# Patient Record
Sex: FEMALE | Race: BLACK OR AFRICAN AMERICAN | NOT HISPANIC OR LATINO
[De-identification: names, ages, dates, MRNs, and addresses within clinical notes are randomized per-mention and may not be internally consistent; named-entity substitution may affect disease eponyms.]

---

## 2017-02-08 ENCOUNTER — APPOINTMENT (OUTPATIENT)
Dept: OBGYN | Facility: HOSPITAL | Age: 32
End: 2017-02-08

## 2017-02-16 ENCOUNTER — APPOINTMENT (OUTPATIENT)
Dept: INTERNAL MEDICINE | Facility: HOSPITAL | Age: 32
End: 2017-02-16

## 2017-02-28 ENCOUNTER — APPOINTMENT (OUTPATIENT)
Dept: INTERNAL MEDICINE | Facility: HOSPITAL | Age: 32
End: 2017-02-28

## 2017-03-16 ENCOUNTER — LABORATORY RESULT (OUTPATIENT)
Age: 32
End: 2017-03-16

## 2017-03-16 ENCOUNTER — APPOINTMENT (OUTPATIENT)
Dept: OBGYN | Facility: HOSPITAL | Age: 32
End: 2017-03-16

## 2017-03-16 ENCOUNTER — OUTPATIENT (OUTPATIENT)
Dept: OUTPATIENT SERVICES | Facility: HOSPITAL | Age: 32
LOS: 1 days | End: 2017-03-16

## 2017-03-16 ENCOUNTER — RESULT CHARGE (OUTPATIENT)
Age: 32
End: 2017-03-16

## 2017-03-16 VITALS
SYSTOLIC BLOOD PRESSURE: 124 MMHG | DIASTOLIC BLOOD PRESSURE: 84 MMHG | WEIGHT: 252.56 LBS | HEART RATE: 71 BPM | BODY MASS INDEX: 44.74 KG/M2

## 2017-03-16 DIAGNOSIS — N91.2 AMENORRHEA, UNSPECIFIED: ICD-10-CM

## 2017-03-17 LAB
ESTRADIOL FREE SERPL-MCNC: 58 PG/ML — SIGNIFICANT CHANGE UP
FSH SERPL-MCNC: 5 IU/L — SIGNIFICANT CHANGE UP
HCG UR QL: NEGATIVE
LH SERPL-ACNC: 4.4 IU/L — SIGNIFICANT CHANGE UP
QUALITY CONTROL: YES

## 2017-03-30 ENCOUNTER — APPOINTMENT (OUTPATIENT)
Dept: OBGYN | Facility: HOSPITAL | Age: 32
End: 2017-03-30

## 2017-04-05 ENCOUNTER — APPOINTMENT (OUTPATIENT)
Dept: ULTRASOUND IMAGING | Facility: IMAGING CENTER | Age: 32
End: 2017-04-05

## 2017-04-05 ENCOUNTER — APPOINTMENT (OUTPATIENT)
Dept: INTERNAL MEDICINE | Facility: HOSPITAL | Age: 32
End: 2017-04-05

## 2017-05-17 ENCOUNTER — APPOINTMENT (OUTPATIENT)
Dept: OBGYN | Facility: HOSPITAL | Age: 32
End: 2017-05-17

## 2017-06-08 ENCOUNTER — EMERGENCY (EMERGENCY)
Facility: HOSPITAL | Age: 32
LOS: 1 days | Discharge: HOME | End: 2017-06-08
Attending: EMERGENCY MEDICINE | Admitting: EMERGENCY MEDICINE
Payer: MEDICAID

## 2017-06-08 VITALS
TEMPERATURE: 98 F | DIASTOLIC BLOOD PRESSURE: 88 MMHG | OXYGEN SATURATION: 100 % | SYSTOLIC BLOOD PRESSURE: 140 MMHG | RESPIRATION RATE: 16 BRPM | HEART RATE: 89 BPM

## 2017-06-08 PROCEDURE — 99284 EMERGENCY DEPT VISIT MOD MDM: CPT

## 2017-06-08 RX ORDER — DIPHENHYDRAMINE HCL 50 MG
1 CAPSULE ORAL
Qty: 12 | Refills: 0 | OUTPATIENT
Start: 2017-06-08 | End: 2017-06-11

## 2017-06-08 RX ORDER — DIPHENHYDRAMINE HCL 50 MG
25 CAPSULE ORAL ONCE
Qty: 0 | Refills: 0 | Status: COMPLETED | OUTPATIENT
Start: 2017-06-08 | End: 2017-06-08

## 2017-06-08 RX ADMIN — Medication 40 MILLIGRAM(S): at 15:38

## 2017-06-08 RX ADMIN — Medication 25 MILLIGRAM(S): at 15:38

## 2017-06-08 NOTE — ED PROVIDER NOTE - CARE PLAN
Principal Discharge DX:	Allergic reaction  Instructions for follow-up, activity and diet:	The patient was given verbal and written discharge instructions. Specifically, instructions when to return to the ED and when to seek follow-up from their pcp was discussed. Any specialty follow-up was discussed, including how to make an appointment.  Instructions were discussed in simple, plain language and was understood by the patient. The patient understands that should their symptoms worsen or any new symptoms arise, they should return to the ED immediately for further evaluation.

## 2017-06-08 NOTE — ED PROVIDER NOTE - ATTENDING CONTRIBUTION TO CARE
DR. PERALES, ATTENDING MD-  I performed a face to face bedside interview with patient regarding history of present illness, review of symptoms and past medical history. I completed an independent physical exam.  I have discussed patient's plan of care with the resident.   Documentation as above in the note.    31 y/o female with hives x2 days.  Face, arms, abd.  No known allergen.  No new environmental exposures/detergents/perfumes.  Denies f/c, ha, neck stiffness, cp, voice changes, sob, cough, abd pain, n/v/d, dysuria.  Afebrile, vs wnl, nad, ctabil, no stridor/wheeze, no sublingual edema, s1s2 rrr no m/r/g, abd soft non ttp no r/g, no cva tenderness b/l, no leg swelling b/l, mult scattered hives over face/arms/abd.  Allergic rxn:  hives.  No airway involvement, no gi/resp involvement.  Benadryl, prednisone, allergist f/u as o/p, discharge.

## 2017-06-08 NOTE — ED PROVIDER NOTE - SKIN, MLM
Skin normal color for race, warm, dry and intact. Multiple pruritic erythematous blanching lesions of bl upper face, left forearm, rt hand. Nontender. also bl facial swelling above upper eyelids

## 2017-06-08 NOTE — ED PROVIDER NOTE - OBJECTIVE STATEMENT
32yF no pmhx p/w facial swelling and itching x 2d. Seen at outside ED and given benadryl yesterday, which alleviated symptoms mildly but not fully. Awoke today with worsened facial edema. No difficulty breathing or airway swelling. No lip or oropharyngeal swelling. Swelling noted mostly at left upper eyelid. Pt also endorses itching of face, left arm and rt hand. Has not had similar symptoms in past. No new exposures that pt recalls aside for anxiety medication (pt received one dose 1 wk ago when in ED for headache which has since resolved). Pt also notes that she has had exertional chest tightness x 2d, lasts ~5min. No other associated symptoms. No personal cardiac hx. Mother had a CVA.

## 2017-06-08 NOTE — ED ADULT TRIAGE NOTE - CHIEF COMPLAINT QUOTE
Pt with c/o of having an allergic reaction pt noted with hives to her arms and face no lip or tongue swelling pt speaking in clear sentences symptoms going on for three days.

## 2017-06-09 ENCOUNTER — OTHER (OUTPATIENT)
Age: 32
End: 2017-06-09

## 2017-06-09 ENCOUNTER — RX RENEWAL (OUTPATIENT)
Age: 32
End: 2017-06-09

## 2017-06-09 ENCOUNTER — EMERGENCY (EMERGENCY)
Facility: HOSPITAL | Age: 32
LOS: 1 days | Discharge: HOME | End: 2017-06-09
Attending: EMERGENCY MEDICINE | Admitting: EMERGENCY MEDICINE
Payer: MEDICAID

## 2017-06-09 VITALS
RESPIRATION RATE: 20 BRPM | DIASTOLIC BLOOD PRESSURE: 90 MMHG | HEART RATE: 86 BPM | TEMPERATURE: 99 F | SYSTOLIC BLOOD PRESSURE: 157 MMHG | OXYGEN SATURATION: 99 %

## 2017-06-09 DIAGNOSIS — Z98.890 OTHER SPECIFIED POSTPROCEDURAL STATES: Chronic | ICD-10-CM

## 2017-06-09 LAB
ALBUMIN SERPL ELPH-MCNC: 3.8 G/DL — SIGNIFICANT CHANGE UP (ref 3.3–5)
ALP SERPL-CCNC: 43 U/L — SIGNIFICANT CHANGE UP (ref 40–120)
ALT FLD-CCNC: 22 U/L — SIGNIFICANT CHANGE UP (ref 4–33)
AST SERPL-CCNC: 17 U/L — SIGNIFICANT CHANGE UP (ref 4–32)
BASOPHILS # BLD AUTO: 0.01 K/UL — SIGNIFICANT CHANGE UP (ref 0–0.2)
BASOPHILS NFR BLD AUTO: 0.1 % — SIGNIFICANT CHANGE UP (ref 0–2)
BILIRUB SERPL-MCNC: 0.2 MG/DL — SIGNIFICANT CHANGE UP (ref 0.2–1.2)
BUN SERPL-MCNC: 15 MG/DL — SIGNIFICANT CHANGE UP (ref 7–23)
CALCIUM SERPL-MCNC: 9 MG/DL — SIGNIFICANT CHANGE UP (ref 8.4–10.5)
CHLORIDE SERPL-SCNC: 103 MMOL/L — SIGNIFICANT CHANGE UP (ref 98–107)
CO2 SERPL-SCNC: 23 MMOL/L — SIGNIFICANT CHANGE UP (ref 22–31)
CREAT SERPL-MCNC: 0.92 MG/DL — SIGNIFICANT CHANGE UP (ref 0.5–1.3)
EOSINOPHIL # BLD AUTO: 0.83 K/UL — HIGH (ref 0–0.5)
EOSINOPHIL NFR BLD AUTO: 4.9 % — SIGNIFICANT CHANGE UP (ref 0–6)
GLUCOSE SERPL-MCNC: 99 MG/DL — SIGNIFICANT CHANGE UP (ref 70–99)
HCT VFR BLD CALC: 42.4 % — SIGNIFICANT CHANGE UP (ref 34.5–45)
HGB BLD-MCNC: 13.6 G/DL — SIGNIFICANT CHANGE UP (ref 11.5–15.5)
IMM GRANULOCYTES NFR BLD AUTO: 0.3 % — SIGNIFICANT CHANGE UP (ref 0–1.5)
LYMPHOCYTES # BLD AUTO: 27.2 % — SIGNIFICANT CHANGE UP (ref 13–44)
LYMPHOCYTES # BLD AUTO: 4.6 K/UL — HIGH (ref 1–3.3)
MCHC RBC-ENTMCNC: 29.7 PG — SIGNIFICANT CHANGE UP (ref 27–34)
MCHC RBC-ENTMCNC: 32.1 % — SIGNIFICANT CHANGE UP (ref 32–36)
MCV RBC AUTO: 92.6 FL — SIGNIFICANT CHANGE UP (ref 80–100)
MONOCYTES # BLD AUTO: 1.16 K/UL — HIGH (ref 0–0.9)
MONOCYTES NFR BLD AUTO: 6.9 % — SIGNIFICANT CHANGE UP (ref 2–14)
NEUTROPHILS # BLD AUTO: 10.26 K/UL — HIGH (ref 1.8–7.4)
NEUTROPHILS NFR BLD AUTO: 60.6 % — SIGNIFICANT CHANGE UP (ref 43–77)
PLATELET # BLD AUTO: 361 K/UL — SIGNIFICANT CHANGE UP (ref 150–400)
PMV BLD: 10.5 FL — SIGNIFICANT CHANGE UP (ref 7–13)
POTASSIUM SERPL-MCNC: 3.7 MMOL/L — SIGNIFICANT CHANGE UP (ref 3.5–5.3)
POTASSIUM SERPL-SCNC: 3.7 MMOL/L — SIGNIFICANT CHANGE UP (ref 3.5–5.3)
PROT SERPL-MCNC: 7.4 G/DL — SIGNIFICANT CHANGE UP (ref 6–8.3)
RBC # BLD: 4.58 M/UL — SIGNIFICANT CHANGE UP (ref 3.8–5.2)
RBC # FLD: 13 % — SIGNIFICANT CHANGE UP (ref 10.3–14.5)
SODIUM SERPL-SCNC: 139 MMOL/L — SIGNIFICANT CHANGE UP (ref 135–145)
WBC # BLD: 16.91 K/UL — HIGH (ref 3.8–10.5)
WBC # FLD AUTO: 16.91 K/UL — HIGH (ref 3.8–10.5)

## 2017-06-09 PROCEDURE — 99220: CPT

## 2017-06-09 RX ORDER — SODIUM CHLORIDE 9 MG/ML
3000 INJECTION INTRAMUSCULAR; INTRAVENOUS; SUBCUTANEOUS ONCE
Qty: 0 | Refills: 0 | Status: COMPLETED | OUTPATIENT
Start: 2017-06-09 | End: 2017-06-09

## 2017-06-09 RX ORDER — DIPHENHYDRAMINE HCL 50 MG
25 CAPSULE ORAL ONCE
Qty: 0 | Refills: 0 | Status: COMPLETED | OUTPATIENT
Start: 2017-06-09 | End: 2017-06-09

## 2017-06-09 RX ORDER — IPRATROPIUM/ALBUTEROL SULFATE 18-103MCG
3 AEROSOL WITH ADAPTER (GRAM) INHALATION ONCE
Qty: 0 | Refills: 0 | Status: COMPLETED | OUTPATIENT
Start: 2017-06-09 | End: 2017-06-09

## 2017-06-09 RX ORDER — FAMOTIDINE 10 MG/ML
20 INJECTION INTRAVENOUS ONCE
Qty: 0 | Refills: 0 | Status: COMPLETED | OUTPATIENT
Start: 2017-06-09 | End: 2017-06-09

## 2017-06-09 RX ORDER — EPINEPHRINE 0.3 MG/.3ML
0.3 INJECTION INTRAMUSCULAR; SUBCUTANEOUS ONCE
Qty: 0 | Refills: 0 | Status: COMPLETED | OUTPATIENT
Start: 2017-06-09 | End: 2017-06-09

## 2017-06-09 RX ORDER — DIPHENHYDRAMINE HCL 50 MG
25 CAPSULE ORAL EVERY 6 HOURS
Qty: 0 | Refills: 0 | Status: DISCONTINUED | OUTPATIENT
Start: 2017-06-09 | End: 2017-06-13

## 2017-06-09 RX ORDER — FAMOTIDINE 10 MG/ML
20 INJECTION INTRAVENOUS EVERY 12 HOURS
Qty: 0 | Refills: 0 | Status: DISCONTINUED | OUTPATIENT
Start: 2017-06-09 | End: 2017-06-13

## 2017-06-09 RX ADMIN — Medication 25 MILLIGRAM(S): at 16:44

## 2017-06-09 RX ADMIN — Medication 25 MILLIGRAM(S): at 21:07

## 2017-06-09 RX ADMIN — Medication 60 MILLIGRAM(S): at 20:19

## 2017-06-09 RX ADMIN — Medication 60 MILLIGRAM(S): at 16:44

## 2017-06-09 RX ADMIN — EPINEPHRINE 0.3 MILLIGRAM(S): 0.3 INJECTION INTRAMUSCULAR; SUBCUTANEOUS at 18:50

## 2017-06-09 RX ADMIN — Medication 3 MILLILITER(S): at 18:50

## 2017-06-09 RX ADMIN — FAMOTIDINE 20 MILLIGRAM(S): 10 INJECTION INTRAVENOUS at 16:44

## 2017-06-09 RX ADMIN — SODIUM CHLORIDE 3000 MILLILITER(S): 9 INJECTION INTRAMUSCULAR; INTRAVENOUS; SUBCUTANEOUS at 18:03

## 2017-06-09 NOTE — ED SUB INTERN NOTE - MEDICAL DECISION MAKING DETAILS
31 y/o F with no PMH presenting with swelling of her L eyelid and inability to open the eye with mild R sided eye lid swelling as well. Came in yesterdoy for similar symptoms on the sides of her face, was d/sammie with benadryl, motrin and prednisone after improvement in the ED. Denies any constitutional symptoms. May have some itchy throat. PE significant for severe swelling of the Left eye with inabilty to open the eye on command and mild R eye swelling. EOMI intact. Presentation most likely consistent with allergic reaction in light of no constitutional symptoms, b/l eye involvement and itchy eyes and throat. Less likely to be orbital cellulitis due to no fever, intact EOM, no tenderness and erythematous.   Plan:   -IV benadryl, pepcid and salumedrol  -CDU for obs due to failed outpatient management. 31 y/o F with no PMH presenting with swelling of her L eyelid and inability to open the eye with mild R sided eye lid swelling as well. Came in yesterdoy for similar symptoms on the sides of her face, was d/sammie with benadryl, motrin and prednisone after improvement in the ED. Denies any constitutional symptoms. May have some itchy throat. PE significant for severe swelling of the Left eye with inabilty to open the eye on command and mild R eye swelling. EOMI intact. Presentation most likely consistent with allergic reaction in light of no constitutional symptoms, b/l eye involvement and itchy eyes and throat. Less likely to be orbital cellulitis due to no fever, intact EOM, no tenderness and erythematous.   Plan:   -IV benadryl, pepcid and salumedrol  -CDU for obs due to failed outpatient management

## 2017-06-09 NOTE — ED PROVIDER NOTE - EYES, MLM
swelling to bilateral eyelids L>R, EOMI without pain, no conjunctival injection, no tenderness to infraorbital rim

## 2017-06-09 NOTE — ED ADULT NURSE NOTE - OBJECTIVE STATEMENT
31 y/o F with no pertinent medical history presents with complaint of bilateral eye swelling x 2 days. Patient was evaluated in [facility] ED yesterday for same issue but was only having mild L eyelid swelling at that time. Reports that she colored her hair day prior to symptoms beginning. Received prednisone 40 mg and and benadryl yesterday in ED. Was prescribed prednisone as outpatient but did not take dose today secondary to symptoms worsening. Took benadryl once this morning. Reports itching in throat.

## 2017-06-09 NOTE — ED PROVIDER NOTE - MEDICAL DECISION MAKING DETAILS
33 y/o F with allergic reaction x 2 days with bilateral eye swelling worsening since yesterday despite outpatient mangement with oral medications. Will give IV Benadryl/pepcid/solu-medrol. Observation for worsening symptoms

## 2017-06-09 NOTE — ED SUB INTERN NOTE - OBJECTIVE STATEMENT FT
31 y/o F with no PMH presenting with swelling of her left eye as of this morning. She presented to the ED with a similar swelling on the side of her face and was discharged with benadryl, Motrin and prednisone. This morning she woke up with her left eye closed shut and was unable to open it 2/2 swelling. She also reports her right eye beginning to swell also. She reports some "tickling" inside her throat. Reports she used a new hair dye on Tuesday which may have caused an allergic reaction. She denies any fevers, chills, sweats, abdominal pain, lip swelling, chest pain. Is allergic to sesame seeds, denies any seasonal allergies or asthma history.

## 2017-06-09 NOTE — ED PROVIDER NOTE - CHPI ED SYMPTOMS NEG
no chills/no fever no fever/no chest pain, no SOB, no lip swelling, no tongue swelling, no abdominal pain/no nausea/no chills/no vomiting

## 2017-06-09 NOTE — ED ADULT NURSE NOTE - CAS EDN DISCHARGE ASSESSMENT
Patient baseline mental status/Neuro vascular intact post splinting/Symptoms improved/Alert and oriented to person, place and time/Awake Awake/Symptoms improved/Patient baseline mental status/Alert and oriented to person, place and time

## 2017-06-09 NOTE — ED CDU PROVIDER NOTE - PLAN OF CARE
Rest, drink plenty of fluids.  Advance activity as tolerated.  Continue all previously prescribed medications as directed. Use Epipen as directed for severe allergic reactions . Follow up with your primary care physician in 48-72 hours- bring copies of your results.  Follow up at your scheduled allergist appointment.  Return to the Emergency Department for worsening or persistent symptoms OR ANY NEW OR CONCERNING SYMPTOMS.

## 2017-06-09 NOTE — ED CDU PROVIDER NOTE - CHPI ED SYMPTOMS NEG
no nausea/no chest pain, no SOB, no lip swelling, no tongue swelling, no abdominal pain/no vomiting/no fever/no chills

## 2017-06-09 NOTE — ED SUB INTERN NOTE - BILATERAL EYES
L side- swelling of upper and lower eyelids. R side- upper lateral eye lid swollen. No redness or tenderness b/l./pupils equal, round, and reactive to light/TENDERNESS/SWELLING/clear

## 2017-06-09 NOTE — ED CDU PROVIDER NOTE - EYES, MLM
Clear bilaterally, pupils equal, round and reactive to light. Significant nallely eye swelling, left eye swollen shut, however full pain EOM noted when holding eye open

## 2017-06-09 NOTE — ED CDU PROVIDER NOTE - OBJECTIVE STATEMENT
33 y/o F with no pertinent medical history presents with complaint of bilateral eye swelling x 2 days. Patient was evaluated in [facility] ED yesterday for same issue but was only having mild L eyelid swelling at that time. Reports that she colored her hair day prior to symptoms beginning. Received prednisone 40 mg and and benadryl yesterday in ED. Was prescribed prednisone as outpatient but did not take dose today secondary to symptoms worsening. Took benadryl once this morning. Reports itching in throat.    CDU BARBRA Nguyen: Agree with above. Pt is a 33 y/o F no PMHx here w/ allergic rxn sp using a new kelly liquid hair color solution 2 days ago. Was seen yesterday and given Bendaryl/Prednisone however sx worsened prompting ED return. Notable HODA eye swelling, left worse then right, closed shut now due to swelling. No fevers. In the main ED was given Benadryl/Pepcid/Prednisone with improvement of throat/chest tightness. Sent to the CDU for continued antihistamines/steroids. No complaints at present.

## 2017-06-09 NOTE — ED CDU PROVIDER NOTE - ATTENDING CONTRIBUTION TO CARE
Josh JUAN- 32 Y/OF with h/o anxiety disorder p/w swellign of both eyes, scal itchign for few days since she applied the new hair dye. Pt states that swelling of left eye has got worse and she is unable to open her left eye. No sob, chest tightness, throat closing sensation    pt is alert, well appearing obese female, s1s2 normal reg, b/l clear breathe sounds, abd soft, nt, nd, scalp has midl inflammation diffusely and rt eyelids swollen but normal eye opneing, left eyelid is swollen leading to shutting of eye aperture but normal peerl eomi, no rash    plan to give hot compressed and continue with pepcid, benadryl and solumedrol and fluids

## 2017-06-09 NOTE — ED CDU PROVIDER NOTE - FAMILY HISTORY
No pertinent family history in first degree relatives Mother  Still living? Unknown  Family history of cerebrovascular accident (CVA), Age at diagnosis: Age Unknown

## 2017-06-09 NOTE — ED PROVIDER NOTE - OBJECTIVE STATEMENT
31 y/o F with no pertinent medical history presents with complaint of bilateral eye swelling x 2 days. Patient was evaluated in [facility] ED yesterday secondary 31 y/o F with no pertinent medical history presents with complaint of bilateral eye swelling x 2 days. Patient was evaluated in [facility] ED yesterday for same issue but was only having mild L eyelid swelling at that time. Reports that she colored her hair day prior to symptoms beginning. Received prednisone 40 mg and and benadryl yesterday in ED. Was prescribed prednisone as outpatient but did not take dose today secondary to symptoms worsening. Took benadryl once this morning. Reports itching in throat.

## 2017-06-09 NOTE — ED CDU PROVIDER NOTE - PROGRESS NOTE DETAILS
cdu progress note: Josh JUAN- 31 Y/O F with allergic reaction from hair dye p/w worsening swelling around left eye leading to eye closure due to swelling at baseline cdu progress note: Josh JUAN- 33 Y/O F with allergic reaction from hair dye p/w worsening swelling around left eye leading to eye closure due to swelling at baseline. BARBRA Nguyen: Pt noted to have some chest tightness and right chest wall pain. Epi and duoneb given. Pt put on tele. Will continue monitoring. BARBRA Harris: pt doing well, + swelling to L eye lid, reports improving from previous. Denies sob cp difficuly breathing nausea vomiting. ctabl. no rash. will continue tele monitor for at least 6 hours post epi. pt to get 60 more of solumedrol to make full 120. prednisone for d/c. plan to reassess in AM and d/c if improved and stable. BARBRA Harris: pt resting comfortable, c/o "chest tightness" pointing to epigastric area. States before when she had this she took a breathing treatment and it resolved, wants another treatment. Lungs CTABL. No pruritis. No lip/tongue/uvular swelling. L eye lid swelling improved. Will do neb and reassess. Bharat Zapien: Pt admits to feeling much better- no swelling, swallowing without difficulty. will dc home w/ epipen - pt knows how to use and to continue her prednisone she was prescribed. Pt has appt with allergist on June 27. Pt agrees and understands plan, return precautions given. CDU MD CHO:  Pt reports feeling back to baseline.  Eyelid edema resolved, no chest tightness, no difficulty breathing/swallowing.  Afebrile, vs wnl, nad, ctabil, s1s2 rrr no m/r/g, abd soft non ttp no r/g, scattered hives. CDU MD CHO - Attending Discharge Note: Patient is stable.  Labs and tests reviewed with the patient.  The patient is stable for discharge. Bharat Zapien: Pt admits to feeling much better-  eyelid swelling improved, swallowing without difficulty. will dc home w/ epipen - pt knows how to use and to continue her prednisone she was prescribed. Pt has appt with allergist on June 27. Pt agrees and understands plan, return precautions given. States no chance she could be pregnant. ***ED POST DISCHARGE NOTE*** ( was unable to select from documents) patient called [phone]/11 [phone] regarding epipen- explained to patient that the medication is for severe allergic reactions and not to be taken daily ( patient has not used the pen yet and wanted to confirm directions). Pt admits to feeling better, advised to continue prednisone, benadryl f/u PMD and allergist and return to the ED if symptoms develop/worsen.

## 2017-06-09 NOTE — ED ADULT TRIAGE NOTE - CHIEF COMPLAINT QUOTE
pt have hair color (Poornima) applied on Tures.  pt was seem and D/C yest. for Allergic reaction with some facial swelling,   today pt coming with increase swelling of face/nallely. eye/neck /some chest tightness.  denies difficulties swallowing

## 2017-06-09 NOTE — ED PROVIDER NOTE - ATTENDING CONTRIBUTION TO CARE
ED Attending Dr. Mcpherson: 31 yo female with no sig PMH in ED with swelling and itching to face beginning 4 days ago, approx 1 day after using hair dye on scalp.  Was seen in ED for this, given PO treatment for allergic reaction, returned to ED today due to worsening symptoms.  No airway issues at any time.  On exam pt overall well appearing, heart RRR, lungs CTAB, abd NTND, extremities without swelling, strength 5/5 in all extremities and skin without rash.  Face generally with mild swelling throughout but left eyelid swollen shut--on opening of eyelid the eye has full ROM without pain.  Right lower eyelid swollen.  No signs of facial/orbital/preseptal cellulitis on exam.  Throat patent, no stridor.

## 2017-06-10 VITALS
OXYGEN SATURATION: 98 % | RESPIRATION RATE: 18 BRPM | DIASTOLIC BLOOD PRESSURE: 73 MMHG | TEMPERATURE: 98 F | HEART RATE: 72 BPM | SYSTOLIC BLOOD PRESSURE: 121 MMHG

## 2017-06-10 LAB
ALBUMIN SERPL ELPH-MCNC: 3.7 G/DL — SIGNIFICANT CHANGE UP (ref 3.3–5)
ALP SERPL-CCNC: 39 U/L — LOW (ref 40–120)
ALT FLD-CCNC: 20 U/L — SIGNIFICANT CHANGE UP (ref 4–33)
AST SERPL-CCNC: 14 U/L — SIGNIFICANT CHANGE UP (ref 4–32)
BASOPHILS # BLD AUTO: 0.01 K/UL — SIGNIFICANT CHANGE UP (ref 0–0.2)
BASOPHILS NFR BLD AUTO: 0.1 % — SIGNIFICANT CHANGE UP (ref 0–2)
BILIRUB SERPL-MCNC: 0.2 MG/DL — SIGNIFICANT CHANGE UP (ref 0.2–1.2)
BUN SERPL-MCNC: 10 MG/DL — SIGNIFICANT CHANGE UP (ref 7–23)
CALCIUM SERPL-MCNC: 8.6 MG/DL — SIGNIFICANT CHANGE UP (ref 8.4–10.5)
CHLORIDE SERPL-SCNC: 108 MMOL/L — HIGH (ref 98–107)
CO2 SERPL-SCNC: 21 MMOL/L — LOW (ref 22–31)
CREAT SERPL-MCNC: 0.74 MG/DL — SIGNIFICANT CHANGE UP (ref 0.5–1.3)
EOSINOPHIL # BLD AUTO: 0 K/UL — SIGNIFICANT CHANGE UP (ref 0–0.5)
EOSINOPHIL NFR BLD AUTO: 0 % — SIGNIFICANT CHANGE UP (ref 0–6)
GLUCOSE SERPL-MCNC: 151 MG/DL — HIGH (ref 70–99)
HBA1C BLD-MCNC: 5.7 % — HIGH (ref 4–5.6)
HCT VFR BLD CALC: 39.5 % — SIGNIFICANT CHANGE UP (ref 34.5–45)
HGB BLD-MCNC: 12.8 G/DL — SIGNIFICANT CHANGE UP (ref 11.5–15.5)
IMM GRANULOCYTES NFR BLD AUTO: 0.2 % — SIGNIFICANT CHANGE UP (ref 0–1.5)
LYMPHOCYTES # BLD AUTO: 14.2 % — SIGNIFICANT CHANGE UP (ref 13–44)
LYMPHOCYTES # BLD AUTO: 2.01 K/UL — SIGNIFICANT CHANGE UP (ref 1–3.3)
MCHC RBC-ENTMCNC: 29.7 PG — SIGNIFICANT CHANGE UP (ref 27–34)
MCHC RBC-ENTMCNC: 32.4 % — SIGNIFICANT CHANGE UP (ref 32–36)
MCV RBC AUTO: 91.6 FL — SIGNIFICANT CHANGE UP (ref 80–100)
MONOCYTES # BLD AUTO: 0.37 K/UL — SIGNIFICANT CHANGE UP (ref 0–0.9)
MONOCYTES NFR BLD AUTO: 2.6 % — SIGNIFICANT CHANGE UP (ref 2–14)
NEUTROPHILS # BLD AUTO: 11.78 K/UL — HIGH (ref 1.8–7.4)
NEUTROPHILS NFR BLD AUTO: 82.9 % — HIGH (ref 43–77)
PLATELET # BLD AUTO: 329 K/UL — SIGNIFICANT CHANGE UP (ref 150–400)
PMV BLD: 10.7 FL — SIGNIFICANT CHANGE UP (ref 7–13)
POTASSIUM SERPL-MCNC: 3.8 MMOL/L — SIGNIFICANT CHANGE UP (ref 3.5–5.3)
POTASSIUM SERPL-SCNC: 3.8 MMOL/L — SIGNIFICANT CHANGE UP (ref 3.5–5.3)
PROT SERPL-MCNC: 7.2 G/DL — SIGNIFICANT CHANGE UP (ref 6–8.3)
RBC # BLD: 4.31 M/UL — SIGNIFICANT CHANGE UP (ref 3.8–5.2)
RBC # FLD: 13.2 % — SIGNIFICANT CHANGE UP (ref 10.3–14.5)
SODIUM SERPL-SCNC: 143 MMOL/L — SIGNIFICANT CHANGE UP (ref 135–145)
WBC # BLD: 14.2 K/UL — HIGH (ref 3.8–10.5)
WBC # FLD AUTO: 14.2 K/UL — HIGH (ref 3.8–10.5)

## 2017-06-10 PROCEDURE — 99217: CPT

## 2017-06-10 RX ORDER — DIPHENHYDRAMINE HCL 50 MG
25 CAPSULE ORAL ONCE
Qty: 0 | Refills: 0 | Status: COMPLETED | OUTPATIENT
Start: 2017-06-10 | End: 2017-06-10

## 2017-06-10 RX ORDER — EPINEPHRINE 0.3 MG/.3ML
1 INJECTION INTRAMUSCULAR; SUBCUTANEOUS
Qty: 2 | Refills: 0 | OUTPATIENT
Start: 2017-06-10

## 2017-06-10 RX ORDER — IPRATROPIUM/ALBUTEROL SULFATE 18-103MCG
3 AEROSOL WITH ADAPTER (GRAM) INHALATION ONCE
Qty: 0 | Refills: 0 | Status: COMPLETED | OUTPATIENT
Start: 2017-06-10 | End: 2017-06-10

## 2017-06-10 RX ADMIN — Medication 25 MILLIGRAM(S): at 06:12

## 2017-06-10 RX ADMIN — FAMOTIDINE 20 MILLIGRAM(S): 10 INJECTION INTRAVENOUS at 06:12

## 2017-06-10 RX ADMIN — Medication 25 MILLIGRAM(S): at 00:46

## 2017-06-10 RX ADMIN — Medication 3 MILLILITER(S): at 01:40

## 2017-06-10 RX ADMIN — Medication 60 MILLIGRAM(S): at 06:13

## 2017-06-11 RX ORDER — EPINEPHRINE 0.3 MG/.3ML
1 INJECTION INTRAMUSCULAR; SUBCUTANEOUS
Qty: 2 | Refills: 0 | OUTPATIENT
Start: 2017-06-11

## 2017-06-16 ENCOUNTER — OUTPATIENT (OUTPATIENT)
Dept: OUTPATIENT SERVICES | Facility: HOSPITAL | Age: 32
LOS: 1 days | End: 2017-06-16

## 2017-06-16 ENCOUNTER — APPOINTMENT (OUTPATIENT)
Dept: INTERNAL MEDICINE | Facility: HOSPITAL | Age: 32
End: 2017-06-16

## 2017-06-16 VITALS
RESPIRATION RATE: 16 BRPM | SYSTOLIC BLOOD PRESSURE: 131 MMHG | DIASTOLIC BLOOD PRESSURE: 87 MMHG | HEART RATE: 88 BPM | TEMPERATURE: 98.1 F

## 2017-06-16 VITALS — WEIGHT: 249 LBS | HEIGHT: 63 IN | BODY MASS INDEX: 44.12 KG/M2

## 2017-06-16 DIAGNOSIS — T78.3XXA ANGIONEUROTIC EDEMA, INITIAL ENCOUNTER: ICD-10-CM

## 2017-06-16 DIAGNOSIS — Z00.00 ENCOUNTER FOR GENERAL ADULT MEDICAL EXAMINATION WITHOUT ABNORMAL FINDINGS: ICD-10-CM

## 2017-06-16 DIAGNOSIS — Z98.890 OTHER SPECIFIED POSTPROCEDURAL STATES: Chronic | ICD-10-CM

## 2017-06-16 DIAGNOSIS — Z23 ENCOUNTER FOR IMMUNIZATION: ICD-10-CM

## 2017-06-16 DIAGNOSIS — F32.9 MAJOR DEPRESSIVE DISORDER, SINGLE EPISODE, UNSPECIFIED: ICD-10-CM

## 2017-06-16 RX ORDER — CAMPHOR 0.45 %
25 GEL (GRAM) TOPICAL
Refills: 0 | Status: ACTIVE | COMMUNITY
Start: 2017-06-16

## 2017-06-27 ENCOUNTER — APPOINTMENT (OUTPATIENT)
Dept: PEDIATRIC ALLERGY IMMUNOLOGY | Facility: CLINIC | Age: 32
End: 2017-06-27

## 2017-06-27 ENCOUNTER — LABORATORY RESULT (OUTPATIENT)
Age: 32
End: 2017-06-27

## 2017-06-27 VITALS
OXYGEN SATURATION: 95 % | HEIGHT: 62.99 IN | WEIGHT: 250 LBS | BODY MASS INDEX: 44.3 KG/M2 | SYSTOLIC BLOOD PRESSURE: 127 MMHG | HEART RATE: 82 BPM | DIASTOLIC BLOOD PRESSURE: 81 MMHG

## 2017-06-27 DIAGNOSIS — Z84.89 FAMILY HISTORY OF OTHER SPECIFIED CONDITIONS: ICD-10-CM

## 2017-06-27 DIAGNOSIS — Z82.5 FAMILY HISTORY OF ASTHMA AND OTHER CHRONIC LOWER RESPIRATORY DISEASES: ICD-10-CM

## 2017-06-27 DIAGNOSIS — T78.2XXD ANAPHYLACTIC SHOCK, UNSPECIFIED, SUBSEQUENT ENCOUNTER: ICD-10-CM

## 2017-06-27 RX ORDER — ESOMEPRAZOLE MAGNESIUM 20 MG/1
20 CAPSULE, DELAYED RELEASE ORAL
Qty: 28 | Refills: 0 | Status: ACTIVE | COMMUNITY
Start: 2017-03-07

## 2017-07-07 LAB
ALMOND IGE QN: 2.92 KUA/L
BASOPHILS # BLD AUTO: 0.02 K/UL
BASOPHILS NFR BLD AUTO: 0.2 %
BRAZIL NUT IGE QN: 2.72 KUA/L
CASHEW NUT IGE QN: 2.31 KUA/L
CHRONIC URTICARIA PANEL (CU INDEX): NORMAL
DEPRECATED ALMOND IGE RAST QL: ABNORMAL
DEPRECATED BRAZIL NUT IGE RAST QL: ABNORMAL
DEPRECATED CASHEW NUT IGE RAST QL: ABNORMAL
DEPRECATED HAZELNUT IGE RAST QL: ABNORMAL
DEPRECATED MACADAMIA IGE RAST QL: NORMAL
DEPRECATED PECAN/HICK TREE IGE RAST QL: NORMAL
DEPRECATED PINE NUT IGE RAST QL: 0
DEPRECATED PISTACHIO IGE RAST QL: 3.09 KUA/L
DEPRECATED SESAME SEED IGE RAST QL: ABNORMAL
DEPRECATED WALNUT IGE RAST QL: ABNORMAL
EOSINOPHIL # BLD AUTO: 0.24 K/UL
EOSINOPHIL NFR BLD AUTO: 2.8 %
ERYTHROCYTE [SEDIMENTATION RATE] IN BLOOD BY WESTERGREN METHOD: 44 MM/HR
HAZELNUT IGE QN: 2.67 KUA/L
HCT VFR BLD CALC: 40.2 %
HGB BLD-MCNC: 13.7 G/DL
IMM GRANULOCYTES NFR BLD AUTO: 0.1 %
LYMPHOCYTES # BLD AUTO: 2.52 K/UL
LYMPHOCYTES NFR BLD AUTO: 29.1 %
MACADAMIA IGE QN: 3.09 KUA/L
MAN DIFF?: NORMAL
MCHC RBC-ENTMCNC: 30.6 PG
MCHC RBC-ENTMCNC: 34.1 GM/DL
MCV RBC AUTO: 89.9 FL
MONOCYTES # BLD AUTO: 0.44 K/UL
MONOCYTES NFR BLD AUTO: 5.1 %
NEUTROPHILS # BLD AUTO: 5.42 K/UL
NEUTROPHILS NFR BLD AUTO: 62.7 %
PECAN/HICK TREE IGE QN: 0.29 KUA/L
PINE NUT IGE QN: <0.1 KUA/L
PISTACHIO IGE QN: ABNORMAL
PLATELET # BLD AUTO: 286 K/UL
RBC # BLD: 4.47 M/UL
RBC # FLD: 13.2 %
SESAME SEED IGE QN: 20.8 KUA/L
TRYPTASE: 3 NG/ML
TSH SERPL-ACNC: 0.99 UIU/ML
WALNUT IGE QN: 1.03 KUA/L
WBC # FLD AUTO: 8.65 K/UL

## 2017-07-13 ENCOUNTER — APPOINTMENT (OUTPATIENT)
Dept: OBGYN | Facility: HOSPITAL | Age: 32
End: 2017-07-13

## 2017-07-18 LAB
HBA1C MFR BLD HPLC: 5.7
HBA1C MFR BLD: 5.7

## 2017-08-22 ENCOUNTER — MEDICATION RENEWAL (OUTPATIENT)
Age: 32
End: 2017-08-22

## 2018-02-07 ENCOUNTER — EMERGENCY (EMERGENCY)
Facility: HOSPITAL | Age: 33
LOS: 1 days | Discharge: HOME | End: 2018-02-07
Attending: EMERGENCY MEDICINE | Admitting: EMERGENCY MEDICINE
Payer: MEDICAID

## 2018-02-07 VITALS
TEMPERATURE: 98 F | HEART RATE: 74 BPM | DIASTOLIC BLOOD PRESSURE: 72 MMHG | OXYGEN SATURATION: 100 % | SYSTOLIC BLOOD PRESSURE: 126 MMHG | RESPIRATION RATE: 16 BRPM

## 2018-02-07 VITALS
HEART RATE: 89 BPM | DIASTOLIC BLOOD PRESSURE: 63 MMHG | OXYGEN SATURATION: 98 % | RESPIRATION RATE: 16 BRPM | TEMPERATURE: 99 F | SYSTOLIC BLOOD PRESSURE: 132 MMHG

## 2018-02-07 DIAGNOSIS — Z98.890 OTHER SPECIFIED POSTPROCEDURAL STATES: Chronic | ICD-10-CM

## 2018-02-07 PROCEDURE — 99284 EMERGENCY DEPT VISIT MOD MDM: CPT

## 2018-02-07 PROCEDURE — 71046 X-RAY EXAM CHEST 2 VIEWS: CPT | Mod: 26

## 2018-02-07 RX ORDER — IBUPROFEN 200 MG
400 TABLET ORAL ONCE
Qty: 0 | Refills: 0 | Status: COMPLETED | OUTPATIENT
Start: 2018-02-07 | End: 2018-02-07

## 2018-02-07 RX ADMIN — Medication 400 MILLIGRAM(S): at 23:38

## 2018-02-07 NOTE — ED PROVIDER NOTE - PHYSICAL EXAMINATION
General: well appearing female in no acute distress   Respiratory: normal work of breathing, lungs clear to auscultation bilaterally   Cardiac: regular rate and rhythm, no chest wall tenderness to palpation   Abdomen: soft, non-tender, no CVA tenderness   MSK: tenderness of left shoulder and neck, full ROM, no swelling or tenderness of lower extremities   Neuro: A&Ox3, cranial nerves II-XII intact

## 2018-02-07 NOTE — ED PROVIDER NOTE - MEDICAL DECISION MAKING DETAILS
32F, presenting with atypical chest pain. no CAD or PE risk factors. no current chest pain. significant psychosocial risk factors. concern for msk pain. plan for motrin, cxr.

## 2018-02-07 NOTE — ED PROVIDER NOTE - PROGRESS NOTE DETAILS
patient reports symptoms improved after mortrin. updated on cxr results. will discharge. - resident Dudley Dobson

## 2018-02-07 NOTE — ED PROVIDER NOTE - ATTENDING CONTRIBUTION TO CARE
32F h/o anxiety, prior allergic reaction, on no meds, c/o chest pain today mostly L pectoral region, also L neck shoulder and upper back, worse with movement. No SOB, No cough, No other associated symptoms. She has no family history of CAD or PE. Non smoker no drugs. On exam: VSS lungs, heart, pulses, abdomen, neuro, extremities, skin, all normal on exam. + pain is reproducible to touch. EKG normal. Likely musculoskeletal etiology. Will give motrin and do CXR
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
## 2018-02-07 NOTE — ED ADULT NURSE NOTE - OBJECTIVE STATEMENT
Saleem RN: The patient is a 33 y/o female a&ox4 p/w a c/c of acute onset SOB and non-radiating midsternal CP that began acutely at 1400 hours today.  The patient denied any palliating or provoking factors, reported the CP and SOB lasted two hours and than remitted.  At present the patient is reporting left shoulder pain that radiates to the left side of her face.  Sensation BL and equal on both sides of face, negative facial droop, negative slurred speech, negative pronator drift.  PMH of anxiety, receiving tx at [facility] [place], reports non-compliance with medications.  At present denying CP, SOB, N/V/D, fevers/chills, GI/ symptoms.  VSS, 12 lead ECG done in triage, MD at bedside.

## 2018-02-07 NOTE — ED ADULT TRIAGE NOTE - CHIEF COMPLAINT QUOTE
pt arrives w/ c/o chest pain/sob and pain to left side of face. pt states she has been feeling hot and cold with vomiting. pt states has had her menstrual cycle 2 times in one month. ekg in progress. pmh anxiety but not taking the meds she is rxed for it.

## 2018-02-13 ENCOUNTER — LABORATORY RESULT (OUTPATIENT)
Age: 33
End: 2018-02-13

## 2018-02-13 ENCOUNTER — OUTPATIENT (OUTPATIENT)
Dept: OUTPATIENT SERVICES | Facility: HOSPITAL | Age: 33
LOS: 1 days | End: 2018-02-13

## 2018-02-13 ENCOUNTER — APPOINTMENT (OUTPATIENT)
Dept: INTERNAL MEDICINE | Facility: HOSPITAL | Age: 33
End: 2018-02-13
Payer: MEDICAID

## 2018-02-13 VITALS
HEART RATE: 84 BPM | RESPIRATION RATE: 16 BRPM | SYSTOLIC BLOOD PRESSURE: 128 MMHG | TEMPERATURE: 98.1 F | DIASTOLIC BLOOD PRESSURE: 82 MMHG

## 2018-02-13 VITALS — HEIGHT: 62 IN | WEIGHT: 250 LBS | BODY MASS INDEX: 46.01 KG/M2

## 2018-02-13 DIAGNOSIS — Z98.890 OTHER SPECIFIED POSTPROCEDURAL STATES: Chronic | ICD-10-CM

## 2018-02-13 DIAGNOSIS — R22.1 LOCALIZED SWELLING, MASS AND LUMP, NECK: ICD-10-CM

## 2018-02-13 LAB
ALBUMIN SERPL ELPH-MCNC: 4 G/DL — SIGNIFICANT CHANGE UP (ref 3.3–5)
ALP SERPL-CCNC: 45 U/L — SIGNIFICANT CHANGE UP (ref 40–120)
ALT FLD-CCNC: 23 U/L — SIGNIFICANT CHANGE UP (ref 4–33)
AST SERPL-CCNC: 16 U/L — SIGNIFICANT CHANGE UP (ref 4–32)
BASOPHILS # BLD AUTO: 0.03 K/UL — SIGNIFICANT CHANGE UP (ref 0–0.2)
BASOPHILS NFR BLD AUTO: 0.3 % — SIGNIFICANT CHANGE UP (ref 0–2)
BILIRUB SERPL-MCNC: 0.4 MG/DL — SIGNIFICANT CHANGE UP (ref 0.2–1.2)
BUN SERPL-MCNC: 14 MG/DL — SIGNIFICANT CHANGE UP (ref 7–23)
CALCIUM SERPL-MCNC: 8.9 MG/DL — SIGNIFICANT CHANGE UP (ref 8.4–10.5)
CHLORIDE SERPL-SCNC: 103 MMOL/L — SIGNIFICANT CHANGE UP (ref 98–107)
CO2 SERPL-SCNC: 26 MMOL/L — SIGNIFICANT CHANGE UP (ref 22–31)
CREAT SERPL-MCNC: 0.89 MG/DL — SIGNIFICANT CHANGE UP (ref 0.5–1.3)
CRP SERPL-MCNC: < 5 MG/L — SIGNIFICANT CHANGE UP
EOSINOPHIL # BLD AUTO: 0.19 K/UL — SIGNIFICANT CHANGE UP (ref 0–0.5)
EOSINOPHIL NFR BLD AUTO: 2.1 % — SIGNIFICANT CHANGE UP (ref 0–6)
ERYTHROCYTE [SEDIMENTATION RATE] IN BLOOD: 23 MM/HR — SIGNIFICANT CHANGE UP (ref 4–25)
GLUCOSE SERPL-MCNC: 93 MG/DL — SIGNIFICANT CHANGE UP (ref 70–99)
HBA1C BLD-MCNC: 5.8 % — HIGH (ref 4–5.6)
HCT VFR BLD CALC: 40.1 % — SIGNIFICANT CHANGE UP (ref 34.5–45)
HGB BLD-MCNC: 13.4 G/DL — SIGNIFICANT CHANGE UP (ref 11.5–15.5)
IMM GRANULOCYTES # BLD AUTO: 0.02 # — SIGNIFICANT CHANGE UP
IMM GRANULOCYTES NFR BLD AUTO: 0.2 % — SIGNIFICANT CHANGE UP (ref 0–1.5)
LYMPHOCYTES # BLD AUTO: 3.12 K/UL — SIGNIFICANT CHANGE UP (ref 1–3.3)
LYMPHOCYTES # BLD AUTO: 34.7 % — SIGNIFICANT CHANGE UP (ref 13–44)
MCHC RBC-ENTMCNC: 30.5 PG — SIGNIFICANT CHANGE UP (ref 27–34)
MCHC RBC-ENTMCNC: 33.4 % — SIGNIFICANT CHANGE UP (ref 32–36)
MCV RBC AUTO: 91.3 FL — SIGNIFICANT CHANGE UP (ref 80–100)
MONOCYTES # BLD AUTO: 0.51 K/UL — SIGNIFICANT CHANGE UP (ref 0–0.9)
MONOCYTES NFR BLD AUTO: 5.7 % — SIGNIFICANT CHANGE UP (ref 2–14)
NEUTROPHILS # BLD AUTO: 5.11 K/UL — SIGNIFICANT CHANGE UP (ref 1.8–7.4)
NEUTROPHILS NFR BLD AUTO: 57 % — SIGNIFICANT CHANGE UP (ref 43–77)
NRBC # FLD: 0 — SIGNIFICANT CHANGE UP
PLATELET # BLD AUTO: 323 K/UL — SIGNIFICANT CHANGE UP (ref 150–400)
PMV BLD: 10.7 FL — SIGNIFICANT CHANGE UP (ref 7–13)
POTASSIUM SERPL-MCNC: 3.6 MMOL/L — SIGNIFICANT CHANGE UP (ref 3.5–5.3)
POTASSIUM SERPL-SCNC: 3.6 MMOL/L — SIGNIFICANT CHANGE UP (ref 3.5–5.3)
PROT SERPL-MCNC: 7.3 G/DL — SIGNIFICANT CHANGE UP (ref 6–8.3)
RBC # BLD: 4.39 M/UL — SIGNIFICANT CHANGE UP (ref 3.8–5.2)
RBC # FLD: 12.9 % — SIGNIFICANT CHANGE UP (ref 10.3–14.5)
SODIUM SERPL-SCNC: 141 MMOL/L — SIGNIFICANT CHANGE UP (ref 135–145)
WBC # BLD: 8.98 K/UL — SIGNIFICANT CHANGE UP (ref 3.8–10.5)
WBC # FLD AUTO: 8.98 K/UL — SIGNIFICANT CHANGE UP (ref 3.8–10.5)

## 2018-02-13 PROCEDURE — 99214 OFFICE O/P EST MOD 30 MIN: CPT | Mod: GC

## 2018-02-13 RX ORDER — FEXOFENADINE HYDROCHLORIDE 180 MG/1
180 TABLET ORAL DAILY
Qty: 30 | Refills: 1 | Status: ACTIVE | COMMUNITY
Start: 2017-06-16 | End: 1900-01-01

## 2018-02-14 DIAGNOSIS — F41.9 ANXIETY DISORDER, UNSPECIFIED: ICD-10-CM

## 2018-02-14 DIAGNOSIS — R22.1 LOCALIZED SWELLING, MASS AND LUMP, NECK: ICD-10-CM

## 2018-02-14 DIAGNOSIS — Z00.00 ENCOUNTER FOR GENERAL ADULT MEDICAL EXAMINATION WITHOUT ABNORMAL FINDINGS: ICD-10-CM

## 2018-03-12 ENCOUNTER — FORM ENCOUNTER (OUTPATIENT)
Age: 33
End: 2018-03-12

## 2018-03-13 ENCOUNTER — APPOINTMENT (OUTPATIENT)
Dept: INTERNAL MEDICINE | Facility: HOSPITAL | Age: 33
End: 2018-03-13
Payer: MEDICAID

## 2018-03-13 ENCOUNTER — APPOINTMENT (OUTPATIENT)
Dept: RADIOLOGY | Facility: HOSPITAL | Age: 33
End: 2018-03-13

## 2018-03-13 ENCOUNTER — OUTPATIENT (OUTPATIENT)
Dept: OUTPATIENT SERVICES | Facility: HOSPITAL | Age: 33
LOS: 1 days | End: 2018-03-13
Payer: MEDICAID

## 2018-03-13 VITALS
HEIGHT: 62 IN | BODY MASS INDEX: 47.11 KG/M2 | HEART RATE: 94 BPM | WEIGHT: 256 LBS | DIASTOLIC BLOOD PRESSURE: 92 MMHG | SYSTOLIC BLOOD PRESSURE: 138 MMHG

## 2018-03-13 DIAGNOSIS — Z98.890 OTHER SPECIFIED POSTPROCEDURAL STATES: Chronic | ICD-10-CM

## 2018-03-13 DIAGNOSIS — F32.9 MAJOR DEPRESSIVE DISORDER, SINGLE EPISODE, UNSPECIFIED: ICD-10-CM

## 2018-03-13 DIAGNOSIS — M25.512 PAIN IN LEFT SHOULDER: ICD-10-CM

## 2018-03-13 DIAGNOSIS — L50.8 OTHER URTICARIA: ICD-10-CM

## 2018-03-13 PROCEDURE — 73030 X-RAY EXAM OF SHOULDER: CPT | Mod: 26,LT

## 2018-03-13 PROCEDURE — 99214 OFFICE O/P EST MOD 30 MIN: CPT | Mod: GC

## 2018-03-13 RX ORDER — SERTRALINE HYDROCHLORIDE 50 MG/1
50 TABLET, FILM COATED ORAL DAILY
Qty: 30 | Refills: 0 | Status: ACTIVE | COMMUNITY
Start: 2018-02-13 | End: 1900-01-01

## 2018-03-13 RX ORDER — CETIRIZINE HYDROCHLORIDE 10 MG/1
10 TABLET, COATED ORAL
Qty: 30 | Refills: 5 | Status: ACTIVE | COMMUNITY
Start: 2017-06-27 | End: 1900-01-01

## 2018-03-13 RX ORDER — IBUPROFEN 600 MG/1
600 TABLET, FILM COATED ORAL
Qty: 120 | Refills: 3 | Status: ACTIVE | COMMUNITY
Start: 2018-03-13 | End: 1900-01-01

## 2018-03-14 PROBLEM — F32.9 DEPRESSION: Status: ACTIVE | Noted: 2017-06-16

## 2018-03-14 PROBLEM — L50.8 ACUTE URTICARIA: Status: ACTIVE | Noted: 2017-06-27

## 2018-03-16 DIAGNOSIS — L50.8 OTHER URTICARIA: ICD-10-CM

## 2018-03-16 DIAGNOSIS — F32.9 MAJOR DEPRESSIVE DISORDER, SINGLE EPISODE, UNSPECIFIED: ICD-10-CM

## 2018-03-16 DIAGNOSIS — F41.9 ANXIETY DISORDER, UNSPECIFIED: ICD-10-CM

## 2018-03-21 ENCOUNTER — APPOINTMENT (OUTPATIENT)
Dept: OBGYN | Facility: HOSPITAL | Age: 33
End: 2018-03-21

## 2018-03-30 ENCOUNTER — LABORATORY RESULT (OUTPATIENT)
Age: 33
End: 2018-03-30

## 2018-03-30 ENCOUNTER — APPOINTMENT (OUTPATIENT)
Dept: OBGYN | Facility: HOSPITAL | Age: 33
End: 2018-03-30

## 2018-03-30 ENCOUNTER — RESULT REVIEW (OUTPATIENT)
Age: 33
End: 2018-03-30

## 2018-03-30 ENCOUNTER — OUTPATIENT (OUTPATIENT)
Dept: OUTPATIENT SERVICES | Facility: HOSPITAL | Age: 33
LOS: 1 days | End: 2018-03-30

## 2018-03-30 VITALS
SYSTOLIC BLOOD PRESSURE: 124 MMHG | DIASTOLIC BLOOD PRESSURE: 63 MMHG | WEIGHT: 259 LBS | BODY MASS INDEX: 47.66 KG/M2 | HEART RATE: 77 BPM | HEIGHT: 62 IN

## 2018-03-30 DIAGNOSIS — Z98.890 OTHER SPECIFIED POSTPROCEDURAL STATES: Chronic | ICD-10-CM

## 2018-03-30 DIAGNOSIS — Z01.419 ENCOUNTER FOR GYNECOLOGICAL EXAMINATION (GENERAL) (ROUTINE) W/OUT ABNORMAL FINDINGS: ICD-10-CM

## 2018-03-31 LAB
C TRACH RRNA SPEC QL NAA+PROBE: SIGNIFICANT CHANGE UP
HBV SURFACE AG SER-ACNC: NONREACTIVE — SIGNIFICANT CHANGE UP
HCV AB S/CO SERPL IA: 0.12 S/CO — SIGNIFICANT CHANGE UP
HCV AB SERPL-IMP: SIGNIFICANT CHANGE UP
HIV 1+2 AB+HIV1 P24 AG SERPL QL IA: SIGNIFICANT CHANGE UP
HPV HIGH+LOW RISK DNA PNL CVX: SIGNIFICANT CHANGE UP
N GONORRHOEA RRNA SPEC QL NAA+PROBE: SIGNIFICANT CHANGE UP
SPECIMEN SOURCE: SIGNIFICANT CHANGE UP
T PALLIDUM AB TITR SER: NEGATIVE — SIGNIFICANT CHANGE UP

## 2018-04-02 DIAGNOSIS — E66.01 MORBID (SEVERE) OBESITY DUE TO EXCESS CALORIES: ICD-10-CM

## 2018-04-02 DIAGNOSIS — Z01.419 ENCOUNTER FOR GYNECOLOGICAL EXAMINATION (GENERAL) (ROUTINE) WITHOUT ABNORMAL FINDINGS: ICD-10-CM

## 2018-04-03 LAB
HCG UR QL: NEGATIVE
QUALITY CONTROL: YES

## 2018-04-04 ENCOUNTER — EMERGENCY (EMERGENCY)
Facility: HOSPITAL | Age: 33
LOS: 1 days | Discharge: HOME | End: 2018-04-04
Admitting: EMERGENCY MEDICINE
Payer: COMMERCIAL

## 2018-04-04 VITALS
DIASTOLIC BLOOD PRESSURE: 73 MMHG | SYSTOLIC BLOOD PRESSURE: 123 MMHG | OXYGEN SATURATION: 100 % | HEART RATE: 79 BPM | RESPIRATION RATE: 16 BRPM | TEMPERATURE: 98 F

## 2018-04-04 DIAGNOSIS — Z98.890 OTHER SPECIFIED POSTPROCEDURAL STATES: Chronic | ICD-10-CM

## 2018-04-04 PROCEDURE — 99283 EMERGENCY DEPT VISIT LOW MDM: CPT | Mod: 25

## 2018-04-04 PROCEDURE — 99053 MED SERV 10PM-8AM 24 HR FAC: CPT

## 2018-04-04 RX ORDER — KETOROLAC TROMETHAMINE 30 MG/ML
30 SYRINGE (ML) INJECTION ONCE
Qty: 0 | Refills: 0 | Status: DISCONTINUED | OUTPATIENT
Start: 2018-04-04 | End: 2018-04-04

## 2018-04-04 RX ORDER — LIDOCAINE 4 G/100G
2 CREAM TOPICAL ONCE
Qty: 0 | Refills: 0 | Status: COMPLETED | OUTPATIENT
Start: 2018-04-04 | End: 2018-04-04

## 2018-04-04 RX ADMIN — Medication 30 MILLIGRAM(S): at 03:42

## 2018-04-04 RX ADMIN — LIDOCAINE 2 PATCH: 4 CREAM TOPICAL at 03:42
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
## 2018-04-04 NOTE — ED PROVIDER NOTE - PROGRESS NOTE DETAILS
PA Rowdy: Pt admits to feeling better. Pt instructed to take Ibuprofen for pain and follow up with her pmd. Patient verbalizes understanding.
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
## 2018-04-04 NOTE — ED PROVIDER NOTE - PLAN OF CARE
Rest, ice.  Take Motrin 600mg every 8 hrs with food for pain.  Follow up with PMD within 48-72 hrs.  Any worsening pain, swelling, weakness, numbness return to ED.
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
## 2018-04-05 LAB — CYTOLOGY SPEC DOC CYTO: SIGNIFICANT CHANGE UP

## 2018-04-10 ENCOUNTER — APPOINTMENT (OUTPATIENT)
Dept: INTERNAL MEDICINE | Facility: HOSPITAL | Age: 33
End: 2018-04-10

## 2018-09-04 ENCOUNTER — EMERGENCY (EMERGENCY)
Facility: HOSPITAL | Age: 33
LOS: 1 days | Discharge: HOME | End: 2018-09-04
Admitting: EMERGENCY MEDICINE
Payer: MEDICAID

## 2018-09-04 VITALS
SYSTOLIC BLOOD PRESSURE: 115 MMHG | HEART RATE: 83 BPM | DIASTOLIC BLOOD PRESSURE: 69 MMHG | RESPIRATION RATE: 16 BRPM | TEMPERATURE: 99 F | OXYGEN SATURATION: 100 %

## 2018-09-04 DIAGNOSIS — Z98.890 OTHER SPECIFIED POSTPROCEDURAL STATES: Chronic | ICD-10-CM

## 2018-09-04 LAB
ALBUMIN SERPL ELPH-MCNC: 3.3 G/DL — SIGNIFICANT CHANGE UP (ref 3.3–5)
ALP SERPL-CCNC: 53 U/L — SIGNIFICANT CHANGE UP (ref 40–120)
ALT FLD-CCNC: 31 U/L — SIGNIFICANT CHANGE UP (ref 4–33)
AMORPH CRY # UR COMP ASSIST: SIGNIFICANT CHANGE UP (ref 0–0)
APPEARANCE UR: SIGNIFICANT CHANGE UP
AST SERPL-CCNC: 27 U/L — SIGNIFICANT CHANGE UP (ref 4–32)
BACTERIA # UR AUTO: SIGNIFICANT CHANGE UP
BASOPHILS # BLD AUTO: 0.03 K/UL — SIGNIFICANT CHANGE UP (ref 0–0.2)
BASOPHILS NFR BLD AUTO: 0.3 % — SIGNIFICANT CHANGE UP (ref 0–2)
BILIRUB SERPL-MCNC: 0.2 MG/DL — SIGNIFICANT CHANGE UP (ref 0.2–1.2)
BILIRUB UR-MCNC: NEGATIVE — SIGNIFICANT CHANGE UP
BLOOD UR QL VISUAL: SIGNIFICANT CHANGE UP
BUN SERPL-MCNC: 15 MG/DL — SIGNIFICANT CHANGE UP (ref 7–23)
CALCIUM SERPL-MCNC: 8.6 MG/DL — SIGNIFICANT CHANGE UP (ref 8.4–10.5)
CHLORIDE SERPL-SCNC: 102 MMOL/L — SIGNIFICANT CHANGE UP (ref 98–107)
CO2 SERPL-SCNC: 22 MMOL/L — SIGNIFICANT CHANGE UP (ref 22–31)
COLOR SPEC: YELLOW — SIGNIFICANT CHANGE UP
CREAT SERPL-MCNC: 1.03 MG/DL — SIGNIFICANT CHANGE UP (ref 0.5–1.3)
EOSINOPHIL # BLD AUTO: 0.13 K/UL — SIGNIFICANT CHANGE UP (ref 0–0.5)
EOSINOPHIL NFR BLD AUTO: 1.4 % — SIGNIFICANT CHANGE UP (ref 0–6)
GLUCOSE SERPL-MCNC: 97 MG/DL — SIGNIFICANT CHANGE UP (ref 70–99)
GLUCOSE UR-MCNC: NEGATIVE — SIGNIFICANT CHANGE UP
HCG SERPL-ACNC: < 5 MIU/ML — SIGNIFICANT CHANGE UP
HCT VFR BLD CALC: 38.8 % — SIGNIFICANT CHANGE UP (ref 34.5–45)
HGB BLD-MCNC: 12.7 G/DL — SIGNIFICANT CHANGE UP (ref 11.5–15.5)
HIV COMBO RESULT: SIGNIFICANT CHANGE UP
HIV1+2 AB SPEC QL: SIGNIFICANT CHANGE UP
HYALINE CASTS # UR AUTO: SIGNIFICANT CHANGE UP
IMM GRANULOCYTES # BLD AUTO: 0.02 # — SIGNIFICANT CHANGE UP
IMM GRANULOCYTES NFR BLD AUTO: 0.2 % — SIGNIFICANT CHANGE UP (ref 0–1.5)
KETONES UR-MCNC: NEGATIVE — SIGNIFICANT CHANGE UP
LEUKOCYTE ESTERASE UR-ACNC: NEGATIVE — SIGNIFICANT CHANGE UP
LYMPHOCYTES # BLD AUTO: 3.51 K/UL — HIGH (ref 1–3.3)
LYMPHOCYTES # BLD AUTO: 37.7 % — SIGNIFICANT CHANGE UP (ref 13–44)
MCHC RBC-ENTMCNC: 30.2 PG — SIGNIFICANT CHANGE UP (ref 27–34)
MCHC RBC-ENTMCNC: 32.7 % — SIGNIFICANT CHANGE UP (ref 32–36)
MCV RBC AUTO: 92.4 FL — SIGNIFICANT CHANGE UP (ref 80–100)
MONOCYTES # BLD AUTO: 0.73 K/UL — SIGNIFICANT CHANGE UP (ref 0–0.9)
MONOCYTES NFR BLD AUTO: 7.8 % — SIGNIFICANT CHANGE UP (ref 2–14)
NEUTROPHILS # BLD AUTO: 4.9 K/UL — SIGNIFICANT CHANGE UP (ref 1.8–7.4)
NEUTROPHILS NFR BLD AUTO: 52.6 % — SIGNIFICANT CHANGE UP (ref 43–77)
NITRITE UR-MCNC: NEGATIVE — SIGNIFICANT CHANGE UP
NRBC # FLD: 0 — SIGNIFICANT CHANGE UP
PH UR: 6 — SIGNIFICANT CHANGE UP (ref 5–8)
PLATELET # BLD AUTO: 323 K/UL — SIGNIFICANT CHANGE UP (ref 150–400)
PMV BLD: 10.1 FL — SIGNIFICANT CHANGE UP (ref 7–13)
POTASSIUM SERPL-MCNC: 4 MMOL/L — SIGNIFICANT CHANGE UP (ref 3.5–5.3)
POTASSIUM SERPL-SCNC: 4 MMOL/L — SIGNIFICANT CHANGE UP (ref 3.5–5.3)
PROT SERPL-MCNC: 6.8 G/DL — SIGNIFICANT CHANGE UP (ref 6–8.3)
PROT UR-MCNC: SIGNIFICANT CHANGE UP
RBC # BLD: 4.2 M/UL — SIGNIFICANT CHANGE UP (ref 3.8–5.2)
RBC # FLD: 13 % — SIGNIFICANT CHANGE UP (ref 10.3–14.5)
RBC CASTS # UR COMP ASSIST: SIGNIFICANT CHANGE UP (ref 0–?)
SODIUM SERPL-SCNC: 137 MMOL/L — SIGNIFICANT CHANGE UP (ref 135–145)
SP GR SPEC: 1.04 — SIGNIFICANT CHANGE UP (ref 1–1.04)
SQUAMOUS # UR AUTO: SIGNIFICANT CHANGE UP
TSH SERPL-MCNC: 0.88 UIU/ML — SIGNIFICANT CHANGE UP (ref 0.27–4.2)
UROBILINOGEN FLD QL: SIGNIFICANT CHANGE UP
WBC # BLD: 9.32 K/UL — SIGNIFICANT CHANGE UP (ref 3.8–10.5)
WBC # FLD AUTO: 9.32 K/UL — SIGNIFICANT CHANGE UP (ref 3.8–10.5)
WBC UR QL: HIGH (ref 0–?)

## 2018-09-04 PROCEDURE — 99284 EMERGENCY DEPT VISIT MOD MDM: CPT

## 2018-09-04 PROCEDURE — 76830 TRANSVAGINAL US NON-OB: CPT | Mod: 26

## 2018-09-04 RX ORDER — ACETAMINOPHEN 500 MG
975 TABLET ORAL ONCE
Qty: 0 | Refills: 0 | Status: COMPLETED | OUTPATIENT
Start: 2018-09-04 | End: 2018-09-04

## 2018-09-04 RX ADMIN — Medication 975 MILLIGRAM(S): at 22:12

## 2018-09-04 NOTE — ED PROVIDER NOTE - OBJECTIVE STATEMENT
34 Y/O F PMH chronic b/l shoulder and back pain presents to ED 34 Y/O F PMH chronic b/l shoulder and back pain presents to ED C/O 2 weeks of LLQ pain which she states reaches 10/10 and is currently 7/10. She states she has not had her period and 32 Y/O F PMH chronic b/l shoulder and back pain presents to ED C/O 2 weeks of LLQ pain which she states reaches 10/10 and is currently 7/10. She states she has not had her period in a little over 1 month and is concerned she may be pregnant. Denies N V D Dizz vaginal discharge fever chills nightsweats CP SOB ABD pn other than L pelvic area tenderness or any other acute symptoms. 32 Y/O F PMH chronic b/l shoulder and back pain presents to ED C/O 2 weeks of LLQ pain which she states reaches 10/10 and is currently 7/10. She states she has not had her period in a little over 1 month and is concerned she may be pregnant. Denies N V D Dizz vaginal discharge fever chills nightsweats CP SOB ABD pn other than L pelvic area tenderness or any other acute symptoms. She states she is eating normally and having normal brown BM's.

## 2018-09-04 NOTE — ED PROVIDER NOTE - PLAN OF CARE
Rest, drink plenty of fluids.  Advance activity as tolerated.  Follow up with your OBGYN and primary doctor as soon as possible. Bring copies of your results. An antibiotic has been sent to your Continue all previously prescribed medications as directed.  Follow up with your primary care physician in 48-72 hours- bring copies of your results.  Return to the ER for worsening or persistent symptoms, and/or ANY NEW OR CONCERNING SYMPTOMS. If you have issues obtaining follow up, please call: [phone] to obtain a doctor or specialist who takes your insurance in your area.  You may call [phone] to make an appointment with the internal medicine clinic. Rest, drink plenty of fluids.  Advance activity as tolerated.  Follow up with your OBGYN and primary doctor as soon as possible. Bring copies of your results. An antibiotic has been sent to your pharmacy, take this medication as prescribed. Continue all previously prescribed medications as directed.  Follow up with your primary care physician in 48-72 hours- bring copies of your results.  Return to the ER for worsening or persistent symptoms, and/or ANY NEW OR CONCERNING SYMPTOMS. If you have issues obtaining follow up, please call: [phone] to obtain a doctor or specialist who takes your insurance in your area.  You may call [phone] to make an appointment with the internal medicine clinic.

## 2018-09-04 NOTE — ED ADULT TRIAGE NOTE - CHIEF COMPLAINT QUOTE
Pt states her menstrual cycle was very light and only lasted one day on Aug 29th and is concerned she may be pregnant. Also endorses vaginal discomfort and LLQ pain.

## 2018-09-04 NOTE — ED PROVIDER NOTE - MEDICAL DECISION MAKING DETAILS
Pt presents C/O 2 weeks of L adnexal tenderness exam largely normal, pt states pain is currently 7/10 but looks well. Denies any other symptoms, requesting tsh and UCG.

## 2018-09-04 NOTE — ED PROVIDER NOTE - CERVIX
physiologic clear to white discharge, no adnexal tenderness bilaterally, no other abnormalities./non tender clear to white discharge with fishy oder, no tenderness to suspect PID, structures are not inflamed, no adnexal tenderness bilaterally, no other abnormalities./non tender

## 2018-09-04 NOTE — ED PROVIDER NOTE - CARE PLAN
Principal Discharge DX:	Pelvic pain in female  Assessment and plan of treatment:	Rest, drink plenty of fluids.  Advance activity as tolerated.  Follow up with your OBGYN and primary doctor as soon as possible. Bring copies of your results. An antibiotic has been sent to your Continue all previously prescribed medications as directed.  Follow up with your primary care physician in 48-72 hours- bring copies of your results.  Return to the ER for worsening or persistent symptoms, and/or ANY NEW OR CONCERNING SYMPTOMS. If you have issues obtaining follow up, please call: [phone] to obtain a doctor or specialist who takes your insurance in your area.  You may call [phone] to make an appointment with the internal medicine clinic.  Secondary Diagnosis:	Bacterial vaginosis Principal Discharge DX:	Pelvic pain in female  Assessment and plan of treatment:	Rest, drink plenty of fluids.  Advance activity as tolerated.  Follow up with your OBGYN and primary doctor as soon as possible. Bring copies of your results. An antibiotic has been sent to your pharmacy, take this medication as prescribed. Continue all previously prescribed medications as directed.  Follow up with your primary care physician in 48-72 hours- bring copies of your results.  Return to the ER for worsening or persistent symptoms, and/or ANY NEW OR CONCERNING SYMPTOMS. If you have issues obtaining follow up, please call: [phone] to obtain a doctor or specialist who takes your insurance in your area.  You may call [phone] to make an appointment with the internal medicine clinic.  Secondary Diagnosis:	Bacterial vaginosis

## 2018-09-05 RX ORDER — METRONIDAZOLE 500 MG
1 TABLET ORAL
Qty: 14 | Refills: 0 | OUTPATIENT
Start: 2018-09-05 | End: 2018-09-11

## 2018-09-06 LAB
BACTERIA UR CULT: SIGNIFICANT CHANGE UP
SPECIMEN SOURCE: SIGNIFICANT CHANGE UP

## 2018-10-01 ENCOUNTER — OUTPATIENT (OUTPATIENT)
Dept: OUTPATIENT SERVICES | Facility: HOSPITAL | Age: 33
LOS: 1 days | End: 2018-10-01

## 2018-10-01 ENCOUNTER — LABORATORY RESULT (OUTPATIENT)
Age: 33
End: 2018-10-01

## 2018-10-01 ENCOUNTER — RESULT CHARGE (OUTPATIENT)
Age: 33
End: 2018-10-01

## 2018-10-01 ENCOUNTER — APPOINTMENT (OUTPATIENT)
Dept: OBGYN | Facility: HOSPITAL | Age: 33
End: 2018-10-01
Payer: MEDICAID

## 2018-10-01 VITALS
DIASTOLIC BLOOD PRESSURE: 55 MMHG | HEIGHT: 62 IN | WEIGHT: 262 LBS | HEART RATE: 82 BPM | SYSTOLIC BLOOD PRESSURE: 112 MMHG | BODY MASS INDEX: 48.21 KG/M2

## 2018-10-01 DIAGNOSIS — Z98.890 OTHER SPECIFIED POSTPROCEDURAL STATES: Chronic | ICD-10-CM

## 2018-10-01 LAB — TSH SERPL-MCNC: 1.09 UIU/ML — SIGNIFICANT CHANGE UP (ref 0.27–4.2)

## 2018-10-01 PROCEDURE — XXXXX: CPT

## 2018-10-01 RX ORDER — MEDROXYPROGESTERONE ACETATE 10 MG/1
10 TABLET ORAL
Qty: 10 | Refills: 0 | Status: ACTIVE | COMMUNITY
Start: 2018-10-01 | End: 1900-01-01

## 2018-10-02 DIAGNOSIS — N91.2 AMENORRHEA, UNSPECIFIED: ICD-10-CM

## 2018-10-02 LAB
ESTRADIOL FREE SERPL-MCNC: 41 PG/ML — SIGNIFICANT CHANGE UP
FSH SERPL-MCNC: 8.3 IU/L — SIGNIFICANT CHANGE UP
PROLACTIN SERPL-MCNC: 15.8 NG/ML — SIGNIFICANT CHANGE UP (ref 3.4–24.1)

## 2018-10-06 ENCOUNTER — EMERGENCY (EMERGENCY)
Facility: HOSPITAL | Age: 33
LOS: 1 days | Discharge: HOME | End: 2018-10-06
Attending: EMERGENCY MEDICINE | Admitting: EMERGENCY MEDICINE
Payer: MEDICAID

## 2018-10-06 VITALS
TEMPERATURE: 99 F | DIASTOLIC BLOOD PRESSURE: 84 MMHG | RESPIRATION RATE: 16 BRPM | HEART RATE: 89 BPM | OXYGEN SATURATION: 98 % | SYSTOLIC BLOOD PRESSURE: 126 MMHG

## 2018-10-06 VITALS
OXYGEN SATURATION: 100 % | RESPIRATION RATE: 17 BRPM | DIASTOLIC BLOOD PRESSURE: 61 MMHG | SYSTOLIC BLOOD PRESSURE: 117 MMHG | HEART RATE: 87 BPM | TEMPERATURE: 99 F

## 2018-10-06 DIAGNOSIS — Z98.890 OTHER SPECIFIED POSTPROCEDURAL STATES: Chronic | ICD-10-CM

## 2018-10-06 LAB
ALBUMIN SERPL ELPH-MCNC: 4 G/DL — SIGNIFICANT CHANGE UP (ref 3.3–5)
ALP SERPL-CCNC: 49 U/L — SIGNIFICANT CHANGE UP (ref 40–120)
ALT FLD-CCNC: 30 U/L — SIGNIFICANT CHANGE UP (ref 4–33)
AST SERPL-CCNC: 18 U/L — SIGNIFICANT CHANGE UP (ref 4–32)
BASOPHILS # BLD AUTO: 0.04 K/UL — SIGNIFICANT CHANGE UP (ref 0–0.2)
BASOPHILS NFR BLD AUTO: 0.3 % — SIGNIFICANT CHANGE UP (ref 0–2)
BILIRUB SERPL-MCNC: 0.4 MG/DL — SIGNIFICANT CHANGE UP (ref 0.2–1.2)
BUN SERPL-MCNC: 13 MG/DL — SIGNIFICANT CHANGE UP (ref 7–23)
CALCIUM SERPL-MCNC: 9.2 MG/DL — SIGNIFICANT CHANGE UP (ref 8.4–10.5)
CHLORIDE SERPL-SCNC: 100 MMOL/L — SIGNIFICANT CHANGE UP (ref 98–107)
CO2 SERPL-SCNC: 22 MMOL/L — SIGNIFICANT CHANGE UP (ref 22–31)
CREAT SERPL-MCNC: 1.07 MG/DL — SIGNIFICANT CHANGE UP (ref 0.5–1.3)
EOSINOPHIL # BLD AUTO: 0.19 K/UL — SIGNIFICANT CHANGE UP (ref 0–0.5)
EOSINOPHIL NFR BLD AUTO: 1.4 % — SIGNIFICANT CHANGE UP (ref 0–6)
GLUCOSE SERPL-MCNC: 97 MG/DL — SIGNIFICANT CHANGE UP (ref 70–99)
HCG SERPL-ACNC: < 5 MIU/ML — SIGNIFICANT CHANGE UP
HCT VFR BLD CALC: 42.1 % — SIGNIFICANT CHANGE UP (ref 34.5–45)
HGB BLD-MCNC: 14 G/DL — SIGNIFICANT CHANGE UP (ref 11.5–15.5)
IMM GRANULOCYTES # BLD AUTO: 0.05 # — SIGNIFICANT CHANGE UP
IMM GRANULOCYTES NFR BLD AUTO: 0.4 % — SIGNIFICANT CHANGE UP (ref 0–1.5)
LYMPHOCYTES # BLD AUTO: 2.92 K/UL — SIGNIFICANT CHANGE UP (ref 1–3.3)
LYMPHOCYTES # BLD AUTO: 21.9 % — SIGNIFICANT CHANGE UP (ref 13–44)
MCHC RBC-ENTMCNC: 30.2 PG — SIGNIFICANT CHANGE UP (ref 27–34)
MCHC RBC-ENTMCNC: 33.3 % — SIGNIFICANT CHANGE UP (ref 32–36)
MCV RBC AUTO: 90.9 FL — SIGNIFICANT CHANGE UP (ref 80–100)
MONOCYTES # BLD AUTO: 0.86 K/UL — SIGNIFICANT CHANGE UP (ref 0–0.9)
MONOCYTES NFR BLD AUTO: 6.4 % — SIGNIFICANT CHANGE UP (ref 2–14)
NEUTROPHILS # BLD AUTO: 9.29 K/UL — HIGH (ref 1.8–7.4)
NEUTROPHILS NFR BLD AUTO: 69.6 % — SIGNIFICANT CHANGE UP (ref 43–77)
NRBC # FLD: 0 — SIGNIFICANT CHANGE UP
PLATELET # BLD AUTO: 351 K/UL — SIGNIFICANT CHANGE UP (ref 150–400)
PMV BLD: 10.3 FL — SIGNIFICANT CHANGE UP (ref 7–13)
POTASSIUM SERPL-MCNC: 3.9 MMOL/L — SIGNIFICANT CHANGE UP (ref 3.5–5.3)
POTASSIUM SERPL-SCNC: 3.9 MMOL/L — SIGNIFICANT CHANGE UP (ref 3.5–5.3)
PROT SERPL-MCNC: 7.7 G/DL — SIGNIFICANT CHANGE UP (ref 6–8.3)
RBC # BLD: 4.63 M/UL — SIGNIFICANT CHANGE UP (ref 3.8–5.2)
RBC # FLD: 13.1 % — SIGNIFICANT CHANGE UP (ref 10.3–14.5)
SODIUM SERPL-SCNC: 139 MMOL/L — SIGNIFICANT CHANGE UP (ref 135–145)
WBC # BLD: 13.35 K/UL — HIGH (ref 3.8–10.5)
WBC # FLD AUTO: 13.35 K/UL — HIGH (ref 3.8–10.5)

## 2018-10-06 PROCEDURE — 99284 EMERGENCY DEPT VISIT MOD MDM: CPT

## 2018-10-06 PROCEDURE — 70491 CT SOFT TISSUE NECK W/DYE: CPT | Mod: 26

## 2018-10-06 RX ORDER — KETOROLAC TROMETHAMINE 30 MG/ML
15 SYRINGE (ML) INJECTION ONCE
Qty: 0 | Refills: 0 | Status: DISCONTINUED | OUTPATIENT
Start: 2018-10-06 | End: 2018-10-06

## 2018-10-06 RX ADMIN — Medication 15 MILLIGRAM(S): at 15:20

## 2018-10-06 RX ADMIN — Medication 300 MILLIGRAM(S): at 18:42

## 2018-10-06 NOTE — ED PROVIDER NOTE - OBJECTIVE STATEMENT
32 y/o F with PSHx of shoulder surgery presents to the ED c/o  headache and a painful cyst on the back of her head. Patient states that it has been worsening over the past week. Patient endorses using peroxide and neosporin for the pain with no relief. Patient feels that the cyst is growing larger. Denies fever, nausea, numbness or any other related symptoms.

## 2018-10-06 NOTE — ED PROVIDER NOTE - PROGRESS NOTE DETAILS
Analilia: Pt signed out to me by DR Osorio pending CT, pt has no foacl collection/abscess on scan. ON exam consistent with cellulitis. will d/c pt home with abx. pt scheduled for rotator cuff surgery on Monday- I advised her to discuss this with her surgeon. Also I discussed the thyroid findings of cyst vs neoplasm on CT. Pt already aware of these findings and has an appointment with her PMD for f/u. copies of results given to the patient.

## 2018-10-06 NOTE — ED PROVIDER NOTE - PHYSICAL EXAMINATION
SKIN: Posterior neck and base of skull area of redness. Slight fluctuance with what appears to be old scar formation that is tender around midline.

## 2018-10-06 NOTE — ED ADULT TRIAGE NOTE - CHIEF COMPLAINT QUOTE
c/o headache, cyst to posterior head that's getting larger and now leaking. Denies any fever. c/o headache, cyst to posterior head that's getting larger and now leaking. Denies any fever. (downtime triage)

## 2018-10-06 NOTE — ED PROVIDER NOTE - MEDICAL DECISION MAKING DETAILS
32 y/o F with headache and painful cyst on the back of head. Plan: Give pain control. Will obtain CT to evaluate for the extension of the cyst, if any drainage fluid collection and reassess.

## 2018-10-06 NOTE — ED ADULT NURSE NOTE - CHIEF COMPLAINT QUOTE
c/o headache, cyst to posterior head that's getting larger and now leaking. Denies any fever. (downtime triage)

## 2018-10-08 NOTE — DISCUSSION/SUMMARY
[ED Visit] : a visit to ED [FreeTextEntry1] : 33 y.o. female with hydradenitis suppurative, irregular menses, anxiety/depression presented to ED on 10/6/18, c/o headache and a cyst on the back of the head. Found to have cellulitis on CT neck. Sent home on clindamycin.  [FreeTextEntry3] : Will task the  for follow up ED visit.

## 2018-10-15 LAB
HCG UR QL: NEGATIVE
QUALITY CONTROL: YES

## 2018-10-17 ENCOUNTER — OUTPATIENT (OUTPATIENT)
Dept: OUTPATIENT SERVICES | Facility: HOSPITAL | Age: 33
LOS: 1 days | End: 2018-10-17

## 2018-10-17 ENCOUNTER — APPOINTMENT (OUTPATIENT)
Dept: INTERNAL MEDICINE | Facility: HOSPITAL | Age: 33
End: 2018-10-17

## 2018-10-17 ENCOUNTER — LABORATORY RESULT (OUTPATIENT)
Age: 33
End: 2018-10-17

## 2018-10-17 VITALS
BODY MASS INDEX: 46.14 KG/M2 | SYSTOLIC BLOOD PRESSURE: 112 MMHG | DIASTOLIC BLOOD PRESSURE: 86 MMHG | WEIGHT: 260.44 LBS | HEIGHT: 63 IN

## 2018-10-17 DIAGNOSIS — Z91.018 ALLERGY TO OTHER FOODS: ICD-10-CM

## 2018-10-17 DIAGNOSIS — L98.9 DISORDER OF THE SKIN AND SUBCUTANEOUS TISSUE, UNSPECIFIED: ICD-10-CM

## 2018-10-17 DIAGNOSIS — Z98.890 OTHER SPECIFIED POSTPROCEDURAL STATES: Chronic | ICD-10-CM

## 2018-10-17 DIAGNOSIS — E66.01 MORBID (SEVERE) OBESITY DUE TO EXCESS CALORIES: ICD-10-CM

## 2018-10-17 DIAGNOSIS — E04.1 NONTOXIC SINGLE THYROID NODULE: ICD-10-CM

## 2018-10-17 DIAGNOSIS — R73.03 PREDIABETES.: ICD-10-CM

## 2018-10-17 LAB — HBA1C BLD-MCNC: 5.7 % — HIGH (ref 4–5.6)

## 2018-10-18 ENCOUNTER — RESULT CHARGE (OUTPATIENT)
Age: 33
End: 2018-10-18

## 2018-10-18 ENCOUNTER — OUTPATIENT (OUTPATIENT)
Dept: OUTPATIENT SERVICES | Facility: HOSPITAL | Age: 33
LOS: 1 days | End: 2018-10-18

## 2018-10-18 ENCOUNTER — APPOINTMENT (OUTPATIENT)
Dept: OBGYN | Facility: HOSPITAL | Age: 33
End: 2018-10-18
Payer: MEDICAID

## 2018-10-18 VITALS
DIASTOLIC BLOOD PRESSURE: 84 MMHG | WEIGHT: 258.56 LBS | SYSTOLIC BLOOD PRESSURE: 132 MMHG | HEART RATE: 93 BPM | BODY MASS INDEX: 45.8 KG/M2

## 2018-10-18 DIAGNOSIS — Z98.890 OTHER SPECIFIED POSTPROCEDURAL STATES: Chronic | ICD-10-CM

## 2018-10-18 DIAGNOSIS — N91.2 AMENORRHEA, UNSPECIFIED: ICD-10-CM

## 2018-10-18 DIAGNOSIS — Z30.09 ENCOUNTER FOR OTHER GENERAL COUNSELING AND ADVICE ON CONTRACEPTION: ICD-10-CM

## 2018-10-18 PROCEDURE — 99213 OFFICE O/P EST LOW 20 MIN: CPT | Mod: GE

## 2018-10-18 RX ORDER — NORGESTIMATE AND ETHINYL ESTRADIOL 0.25-0.035
0.25-35 KIT ORAL DAILY
Qty: 1 | Refills: 0 | Status: ACTIVE | COMMUNITY
Start: 2018-10-18 | End: 1900-01-01

## 2018-10-19 DIAGNOSIS — Z30.09 ENCOUNTER FOR OTHER GENERAL COUNSELING AND ADVICE ON CONTRACEPTION: ICD-10-CM

## 2018-10-19 DIAGNOSIS — N91.2 AMENORRHEA, UNSPECIFIED: ICD-10-CM

## 2018-10-19 NOTE — HISTORY OF PRESENT ILLNESS
[de-identified] : This is 31 year old woman with well controlled hydradenitis suppurativa, irregular menses, anxiety/depression and newly-found food allergies who presents for follow up of cellulitis diagnosed at recent ED visit.\par \par Patient had ED visit on 10/6 for pain and swelling of the back of head/neck. ED imaging/laboratory studies reviewed: leukocytosis 13; CT subcutaneous edema of posterior scalp and 8 mm right thyroid cyst on CT neck soft tissue. Patient was prescribed clindamycin for cellulitis and discharged from ED. Scalp swelling and pain has improved however patient notes new hair loss localized to affected area of scalp. No hair loss elsewhere on scalp. Hidradenitis suppurativa has been well controlled off antibiotics. No recent break outs which are usually localized to bilateral axilla. Serum TSH from 10/1 was 1.09. Patient denies palpitations, increased appetite weight loss, sweating, loose stools, anxiety.\par \par Interval history: Patient seen by Gynecology this month for irregular menstruation and advised to perform progestin challenge. Patient seen by Allergy/Imm in July and diagnosed with multiple nut allergies. She describes new throat itching with other foods she previoulsy tolerated well, for example, bagels.

## 2018-10-19 NOTE — HISTORY OF PRESENT ILLNESS
[de-identified] : This is 31 year old woman with well controlled hydradenitis suppurativa, irregular menses, anxiety/depression and newly-found food allergies who presents for follow up of cellulitis diagnosed at recent ED visit.\par \par Patient had ED visit on 10/6 for pain and swelling of the back of head/neck. ED imaging/laboratory studies reviewed: leukocytosis 13; CT subcutaneous edema of posterior scalp and 8 mm right thyroid cyst on CT neck soft tissue. Patient was prescribed clindamycin for cellulitis and discharged from ED. Scalp swelling and pain has improved however patient notes new hair loss localized to affected area of scalp. No hair loss elsewhere on scalp. Hidradenitis suppurativa has been well controlled off antibiotics. No recent break outs which are usually localized to bilateral axilla. Serum TSH from 10/1 was 1.09. Patient denies palpitations, increased appetite weight loss, sweating, loose stools, anxiety.\par \par Interval history: Patient seen by Gynecology this month for irregular menstruation and advised to perform progestin challenge. Patient seen by Allergy/Imm in July and diagnosed with multiple nut allergies. She describes new throat itching with other foods she previoulsy tolerated well, for example, bagels.

## 2018-10-19 NOTE — ASSESSMENT
[FreeTextEntry1] : This is 31 year old woman with well controlled hydradenitis suppurativa, irregular menses, anxiety/depression and multiple but allergies who presents for follow up of scalp lesion, less likely representative of cellulitis.\par \par # Scalp lesion\par - unclear whether cellulitis vs. alopecia vs. other dermatologic process given associated hair loss\par - will refer to Dermatology for further evaluation, possible skin biopsy\par - stop clindamycin, monitor for swelling and fever\par \par # Thyroid cyst vs. nodule\par - serum TSH 1.09 in 10/2, no alarm symptoms for hyperactive nodule\par - further evaluate with US Thyroid\par  \par # Obesity\par - counselled on dietary measures and routine exercise for weight control\par - provided patient education materials on acceptable portion size, balance of foods\par \par # Pre-diabetes: A1c 5.8 in 02/2018\par - dietary counseling as above \par - re-check A1c today\par \par Seen and d/w Dr. Mcghee\par RTC in 6 months\par \par P Federico PGY3\par [employer]

## 2018-10-19 NOTE — PHYSICAL EXAM
[No Acute Distress] : no acute distress [Well Nourished] : well nourished [Well Developed] : well developed [PERRL] : pupils equal round and reactive to light [EOMI] : extraocular movements intact [No JVD] : no jugular venous distention [No Respiratory Distress] : no respiratory distress  [Clear to Auscultation] : lungs were clear to auscultation bilaterally [Normal Rate] : normal rate  [Regular Rhythm] : with a regular rhythm [Normal S1, S2] : normal S1 and S2 [No Edema] : there was no peripheral edema [No Axillary Lymphadenopathy] : no axillary lymphadenopathy [Soft] : abdomen soft [Non Tender] : non-tender [Normal Posterior Cervical Nodes] : no posterior cervical lymphadenopathy [Normal Anterior Cervical Nodes] : no anterior cervical lymphadenopathy [No CVA Tenderness] : no CVA  tenderness [No Spinal Tenderness] : no spinal tenderness [Normal Gait] : normal gait [No Focal Deficits] : no focal deficits [Normal Affect] : the affect was normal [Normal Insight/Judgement] : insight and judgment were intact [de-identified] : Enlarged lymph node on posterior neck [de-identified] : No palpable thyroid nodules [de-identified] : No wheeze [de-identified] : 2x2 cm area of induration and erythema on posterior scalp, non-tender, no active drainage, associated with hair loss

## 2018-10-19 NOTE — PHYSICAL EXAM
[No Acute Distress] : no acute distress [Well Nourished] : well nourished [Well Developed] : well developed [PERRL] : pupils equal round and reactive to light [EOMI] : extraocular movements intact [No JVD] : no jugular venous distention [No Respiratory Distress] : no respiratory distress  [Clear to Auscultation] : lungs were clear to auscultation bilaterally [Normal Rate] : normal rate  [Regular Rhythm] : with a regular rhythm [Normal S1, S2] : normal S1 and S2 [No Edema] : there was no peripheral edema [No Axillary Lymphadenopathy] : no axillary lymphadenopathy [Soft] : abdomen soft [Non Tender] : non-tender [Normal Posterior Cervical Nodes] : no posterior cervical lymphadenopathy [Normal Anterior Cervical Nodes] : no anterior cervical lymphadenopathy [No CVA Tenderness] : no CVA  tenderness [No Spinal Tenderness] : no spinal tenderness [Normal Gait] : normal gait [No Focal Deficits] : no focal deficits [Normal Affect] : the affect was normal [Normal Insight/Judgement] : insight and judgment were intact [de-identified] : Enlarged lymph node on posterior neck [de-identified] : No palpable thyroid nodules [de-identified] : No wheeze [de-identified] : 2x2 cm area of induration and erythema on posterior scalp, non-tender, no active drainage, associated with hair loss

## 2018-10-19 NOTE — END OF VISIT
[] : Resident [FreeTextEntry3] : 31 year old woman with well controlled hydradenitis suppurativa, irregular menses, anxiety/depression and newly-found food allergies who presents for follow up of cellulitis diagnosed at recent ED visit. Pt noted to have area of induration on posterior scalp with hair loss. \par Derm referral given\par Extensive education on diet to prevent diabetes in the future. \par US thyroid to evaluate for cyst

## 2018-10-19 NOTE — REVIEW OF SYSTEMS
[Fever] : no fever [Chills] : no chills [Vision Problems] : no vision problems [Itching] : no itching [Nasal Discharge] : no nasal discharge [Sore Throat] : no sore throat [Chest Pain] : no chest pain [Palpitations] : no palpitations [Lower Ext Edema] : no lower extremity edema [Wheezing] : no wheezing [Cough] : no cough [Abdominal Pain] : no abdominal pain [Nausea] : no nausea [Diarrhea] : diarrhea [Joint Stiffness] : no joint stiffness [Joint Swelling] : no joint swelling [Muscle Pain] : no muscle pain [Skin Rash] : no skin rash [Headache] : no headache [Anxiety] : no anxiety [Depression] : no depression [FreeTextEntry6] : o

## 2018-10-20 LAB
HCG UR QL: NEGATIVE
QUALITY CONTROL: YES

## 2018-10-25 DIAGNOSIS — E66.01 MORBID (SEVERE) OBESITY DUE TO EXCESS CALORIES: ICD-10-CM

## 2018-10-25 DIAGNOSIS — L98.9 DISORDER OF THE SKIN AND SUBCUTANEOUS TISSUE, UNSPECIFIED: ICD-10-CM

## 2018-10-25 DIAGNOSIS — R73.03 PREDIABETES: ICD-10-CM

## 2018-10-25 DIAGNOSIS — E04.1 NONTOXIC SINGLE THYROID NODULE: ICD-10-CM

## 2018-10-25 DIAGNOSIS — Z91.018 ALLERGY TO OTHER FOODS: ICD-10-CM

## 2018-11-15 ENCOUNTER — APPOINTMENT (OUTPATIENT)
Dept: OBGYN | Facility: HOSPITAL | Age: 33
End: 2018-11-15

## 2019-01-27 ENCOUNTER — EMERGENCY (EMERGENCY)
Facility: HOSPITAL | Age: 34
LOS: 1 days | Discharge: HOME | End: 2019-01-27
Attending: EMERGENCY MEDICINE | Admitting: EMERGENCY MEDICINE
Payer: MEDICAID

## 2019-01-27 VITALS
RESPIRATION RATE: 18 BRPM | OXYGEN SATURATION: 99 % | DIASTOLIC BLOOD PRESSURE: 72 MMHG | HEART RATE: 98 BPM | SYSTOLIC BLOOD PRESSURE: 148 MMHG

## 2019-01-27 VITALS
HEART RATE: 99 BPM | SYSTOLIC BLOOD PRESSURE: 128 MMHG | TEMPERATURE: 99 F | RESPIRATION RATE: 16 BRPM | OXYGEN SATURATION: 100 % | DIASTOLIC BLOOD PRESSURE: 78 MMHG

## 2019-01-27 DIAGNOSIS — Z98.890 OTHER SPECIFIED POSTPROCEDURAL STATES: Chronic | ICD-10-CM

## 2019-01-27 PROCEDURE — 99284 EMERGENCY DEPT VISIT MOD MDM: CPT | Mod: 25

## 2019-01-27 RX ORDER — SODIUM CHLORIDE 9 MG/ML
1000 INJECTION INTRAMUSCULAR; INTRAVENOUS; SUBCUTANEOUS ONCE
Qty: 0 | Refills: 0 | Status: COMPLETED | OUTPATIENT
Start: 2019-01-27 | End: 2019-01-27

## 2019-01-27 RX ORDER — DIPHENHYDRAMINE HCL 50 MG
50 CAPSULE ORAL ONCE
Qty: 0 | Refills: 0 | Status: COMPLETED | OUTPATIENT
Start: 2019-01-27 | End: 2019-01-27

## 2019-01-27 RX ORDER — DEXAMETHASONE 0.5 MG/5ML
10 ELIXIR ORAL ONCE
Qty: 0 | Refills: 0 | Status: COMPLETED | OUTPATIENT
Start: 2019-01-27 | End: 2019-01-27

## 2019-01-27 RX ORDER — FAMOTIDINE 10 MG/ML
20 INJECTION INTRAVENOUS ONCE
Qty: 0 | Refills: 0 | Status: COMPLETED | OUTPATIENT
Start: 2019-01-27 | End: 2019-01-27

## 2019-01-27 RX ADMIN — Medication 102 MILLIGRAM(S): at 04:32

## 2019-01-27 RX ADMIN — SODIUM CHLORIDE 1000 MILLILITER(S): 9 INJECTION INTRAMUSCULAR; INTRAVENOUS; SUBCUTANEOUS at 04:23

## 2019-01-27 RX ADMIN — FAMOTIDINE 20 MILLIGRAM(S): 10 INJECTION INTRAVENOUS at 04:23

## 2019-01-27 RX ADMIN — Medication 50 MILLIGRAM(S): at 04:23

## 2019-01-27 NOTE — ED PROVIDER NOTE - MEDICAL DECISION MAKING DETAILS
allergic rxn, no e/o airway compromise, no stridor, pt breathing comfortably->benadryl, decadron, reassesss

## 2019-01-27 NOTE — ED ADULT TRIAGE NOTE - CHIEF COMPLAINT QUOTE
pt arrives w/ c/o allergic reaction. pt states she had her hair colored yesterday and her scalp was burning. She said she returned to the salon today and they put something else on her hair not sure what it was and now having swelling to face and neck with itchiness. pt denies any difficulty breathing or swallowing.

## 2019-01-27 NOTE — ED ADULT NURSE NOTE - OBJECTIVE STATEMENT
Pt received in room 6 A&OX3 c/o allergic reaction to hair dye. Pt presenting with redness and swelling around hairline. No evidence of angioedema, laryngal edema, N/V/D, tremors. Pt endorses burning at site. Pt is afebrile, respirations equal and unlabored. Labs drawn and sent. Will continue to monitor.

## 2019-01-27 NOTE — ED PROVIDER NOTE - OBJECTIVE STATEMENT
33yF hx peanut allergy, hypothyroidism p/w diffuse body itching, difficulty swallowing, tender areas of swelling to face. Symptoms began yesterday after pt had hair dyed in hair salon. Pt noted burning scalp and itching upon leaving salon. Returned today and had hydrocortisone cream placed on scalp in salon. Upon leaving noted worsened itching. At home today noted sensation of neck swelling/fullness, pain, and difficulty swallowing. Denies n/v/d, or abd pain.

## 2019-01-27 NOTE — ED ADULT NURSE NOTE - FINAL NURSING ELECTRONIC SIGNATURE
Visit Information Date & Time Provider Department Dept. Phone Encounter #  
 10/20/2017  2:30 PM Carolyn Wrightde, 3 [facility] [phone] [id number] Upcoming Health Maintenance Date Due DTaP/Tdap/Td series (1 - Tdap) 5/4/2007 PAP AKA CERVICAL CYTOLOGY 5/4/2007 Allergies as of 10/20/2017  Review Complete On: 10/20/2017 By: Carolyn Foley MD  
 No Known Allergies Current Immunizations  Never Reviewed No immunizations on file. Not reviewed this visit You Were Diagnosed With   
  
 Codes Comments Routine general medical examination at a health care facility    -  Primary ICD-10-CM: Z00.00 ICD-9-CM: V70.0 Breast mass, left     ICD-10-CM: N63.20 ICD-9-CM: 611.72 Migraine without aura and without status migrainosus, not intractable     ICD-10-CM: O81.654 ICD-9-CM: 346.10 Gastroesophageal reflux disease without esophagitis     ICD-10-CM: K21.9 ICD-9-CM: 530.81 Vitals BP Pulse Temp Resp Height(growth percentile) Weight(growth percentile) 120/78 (BP 1 Location: Left arm, BP Patient Position: Sitting) 72 98.4 °F (36.9 °C) (Oral) 22 5' (1.524 m) 117 lb 12.8 oz (53.4 kg) LMP SpO2 BMI OB Status Smoking Status 10/13/2017 (Approximate) 98% 23.01 kg/m2 Having regular periods Never Smoker Vitals History BMI and BSA Data Body Mass Index Body Surface Area 23.01 kg/m 2 1.5 m 2 Your Updated Medication List  
  
   
This list is accurate as of: 10/20/17  2:55 PM.  Always use your most recent med list.  
  
  
  
  
 amitriptyline 25 mg tablet Commonly known as:  ELAVIL Take 1 Tab by mouth nightly. pantoprazole 40 mg tablet Commonly known as:  PROTONIX Take 1 Tab by mouth daily. rizatriptan 10 mg disintegrating tablet Commonly known as:  MAXALT-MLT Dissolve 1 tablet in mouth at first sign of headache, may repeat dose in 2 hours if needed, do not exceed two tablets in 24 hours Prescriptions Printed Refills  
 rizatriptan (MAXALT-MLT) 10 mg disintegrating tablet 5 Sig: Dissolve 1 tablet in mouth at first sign of headache, may repeat dose in 2 hours if needed, do not exceed two tablets in 24 hours Class: Print  
 pantoprazole (PROTONIX) 40 mg tablet 1 Sig: Take 1 Tab by mouth daily. Class: Print Route: Oral  
 amitriptyline (ELAVIL) 25 mg tablet no Sig: Take 1 Tab by mouth nightly. Class: Print Route: Oral  
  
To-Do List   
 10/20/2017 Imaging:  US BREAST LT LIMITED=<3 QUAD Patient Instructions Expect call to schedule ultrasound Decrease caffeine consumption. Rizatriptan 10 mg - Dissolve 1 tablet in mouth at first sign of headache, may repeat dose in 2 hours if needed, do not exceed two tablets in 24 hours Amitriptyline 25 mg - Take 1 tablet daily about 1 hour before bedtime Take pantoprazole 40 mg a day before breakfast. Avoid citrus, dairy, caffeine, tomato, alcohol and NSAIDs. Do not eat within 2-3 hours 
of bedtime. Try an OTC medication containing simethicone such as Phazyme, Gas-Ex or Beano. Avoid carbonated beverages. Return for follow up in 2-3 weeks, sooner with any problems. Well Visit, Ages 25 to 48: Care Instructions Your Care Instructions Physical exams can help you stay healthy. Your doctor has checked your overall health and may have suggested ways to take good care of yourself. He or she also may have recommended tests. At home, you can help prevent illness with healthy eating, regular exercise, and other steps. Follow-up care is a key part of your treatment and safety. Be sure to make and go to all appointments, and call your doctor if you are having problems. It's also a good idea to know your test results and keep a list of the medicines you take. How can you care for yourself at home? · Reach and stay at a healthy weight.  This will lower your risk for many problems, such as obesity, diabetes, heart disease, and high blood pressure. · Get at least 30 minutes of physical activity on most days of the week. Walking is a good choice. You also may want to do other activities, such as running, swimming, cycling, or playing tennis or team sports. Discuss any changes in your exercise program with your doctor. · Do not smoke or allow others to smoke around you. If you need help quitting, talk to your doctor about stop-smoking programs and medicines. These can increase your chances of quitting for good. · Talk to your doctor about whether you have any risk factors for sexually transmitted infections (STIs). Having one sex partner (who does not have STIs and does not have sex with anyone else) is a good way to avoid these infections. · Use birth control if you do not want to have children at this time. Talk with your doctor about the choices available and what might be best for you. · Protect your skin from too much sun. When you're outdoors from 10 a.m. to 4 p.m., stay in the shade or cover up with clothing and a hat with a wide brim. Wear sunglasses that block UV rays. Even when it's cloudy, put broad-spectrum sunscreen (SPF 30 or higher) on any exposed skin. · See a dentist one or two times a year for checkups and to have your teeth cleaned. · Wear a seat belt in the car. · Drink alcohol in moderation, if at all. That means no more than 2 drinks a day for men and 1 drink a day for women. Follow your doctor's advice about when to have certain tests. These tests can spot problems early. For everyone · Cholesterol. Have the fat (cholesterol) in your blood tested after age 21. Your doctor will tell you how often to have this done based on your age, family history, or other things that can increase your risk for heart disease. · Blood pressure.  Have your blood pressure checked during a routine doctor visit. Your doctor will tell you how often to check your blood pressure based on your age, your blood pressure results, and other factors. · Vision. Talk with your doctor about how often to have a glaucoma test. 
· Diabetes. Ask your doctor whether you should have tests for diabetes. · Colon cancer. Have a test for colon cancer at age 48. You may have one of several tests. If you are younger than 48, you may need a test earlier if you have any risk factors. Risk factors include whether you already had a precancerous polyp removed from your colon or whether your parent, brother, sister, or child has had colon cancer. For women · Breast exam and mammogram. Talk to your doctor about when you should have a clinical breast exam and a mammogram. Medical experts differ on whether and how often women under 50 should have these tests. Your doctor can help you decide what is right for you. · Pap test and pelvic exam. Begin Pap tests at age 24. A Pap test is the best way to find cervical cancer. The test often is part of a pelvic exam. Ask how often to have this test. 
· Tests for sexually transmitted infections (STIs). Ask whether you should have tests for STIs. You may be at risk if you have sex with more than one person, especially if your partners do not wear condoms. For men · Tests for sexually transmitted infections (STIs). Ask whether you should have tests for STIs. You may be at risk if you have sex with more than one person, especially if you do not wear a condom. · Testicular cancer exam. Ask your doctor whether you should check your testicles regularly. · Prostate exam. Talk to your doctor about whether you should have a blood test (called a PSA test) for prostate cancer. Experts differ on whether and when men should have this test. Some experts suggest it if you are older than 39 and are -American or have a father or brother who got prostate cancer when he was younger than 72. When should you call for help? Watch closely for changes in your health, and be sure to contact your doctor if you have any problems or symptoms that concern you. Where can you learn more? Go to http://jurgen-tammie.info/. Enter P072 in the search box to learn more about \"Well Visit, Ages 25 to 48: Care Instructions. \" Current as of: July 19, 2016 Content Version: 11.3 © 4070-5974 [employer]itt[employer]. Care instructions adapted under license by [employer] (which disclaims liability or warranty for this information). If you have questions about a medical condition or this instruction, always ask your healthcare professional. [place] [address] any warranty or liability for your use of this information. Gastroesophageal Reflux Disease (GERD): Care Instructions Your Care Instructions Gastroesophageal reflux disease (GERD) is the backward flow of stomach acid into the esophagus. The esophagus is the tube that leads from your throat to your stomach. A one-way valve prevents the stomach acid from moving up into this tube. When you have GERD, this valve does not close tightly enough. If you have mild GERD symptoms including heartburn, you may be able to control the problem with antacids or over-the-counter medicine. Changing your diet, losing weight, and making other lifestyle changes can also help reduce symptoms. Follow-up care is a key part of your treatment and safety. Be sure to make and go to all appointments, and call your doctor if you are having problems. Its also a good idea to know your test results and keep a list of the medicines you take. How can you care for yourself at home? · Take your medicines exactly as prescribed. Call your doctor if you think you are having a problem with your medicine. · Your doctor may recommend over-the-counter medicine.  For mild or occasional indigestion, antacids, such as Tums, Gaviscon, Mylanta, or license by [address] (which disclaims liability or warranty for this information). If you have questions about a medical condition or this instruction, always ask your healthcare professional. [address] any warranty or liability for your use of this information. Migraine Headache: Care Instructions Your Care Instructions Migraines are painful, throbbing headaches that often start on one side of the head. They may cause nausea and vomiting and make you sensitive to light, sound, or smell. Without treatment, migraines can last from 4 hours to a few days. Medicines can help prevent migraines or stop them after they have started. Your doctor can help you find which ones work best for you. Follow-up care is a key part of your treatment and safety. Be sure to make and go to all appointments, and call your doctor if you are having problems. It's also a good idea to know your test results and keep a list of the medicines you take. How can you care for yourself at home? · Do not drive if you have taken a prescription pain medicine. · Rest in a quiet, dark room until your headache is gone. Close your eyes, and try to relax or go to sleep. Don't watch TV or read. · Put a cold, moist cloth or cold pack on the painful area for 10 to 20 minutes at a time. Put a thin cloth between the cold pack and your skin. · Use a warm, moist towel or a heating pad set on low to relax tight shoulder and neck muscles. · Have someone gently massage your neck and shoulders. · Take your medicines exactly as prescribed. Call your doctor if you think you are having a problem with your medicine. You will get more details on the specific medicines your doctor prescribes. · Be careful not to take pain medicine more often than the instructions allow. You could get worse or more frequent headaches when the medicine wears off. To prevent migraines · Keep a headache diary so you can figure out what triggers your headaches. Avoiding triggers may help you prevent headaches. Record when each headache began, how long it lasted, and what the pain was like. (Was it throbbing, aching, stabbing, or dull?) Write down any other symptoms you had with the headache, such as nausea, flashing lights or dark spots, or sensitivity to bright light or loud noise. Note if the headache occurred near your period. List anything that might have triggered the headache. Triggers may include certain foods (chocolate, cheese, wine) or odors, smoke, bright light, stress, or lack of sleep. · If your doctor has prescribed medicine for your migraines, take it as directed. You may have medicine that you take only when you get a migraine and medicine that you take all the time to help prevent migraines. ¨ If your doctor has prescribed medicine for when you get a headache, take it at the first sign of a migraine, unless your doctor has given you other instructions. ¨ If your doctor has prescribed medicine to prevent migraines, take it exactly as prescribed. Call your doctor if you think you are having a problem with your medicine. · Find healthy ways to deal with stress. Migraines are most common during or right after stressful times. Take time to relax before and after you do something that has caused a migraine in the past. 
· Try to keep your muscles relaxed by keeping good posture. Check your jaw, face, neck, and shoulder muscles for tension. Try to relax them. When you sit at a desk, change positions often. And make sure to stretch for 30 seconds each hour. · Get plenty of sleep and exercise. · Eat meals on a regular schedule. Avoid foods and drinks that often trigger migraines.  These include chocolate, alcohol (especially red wine and port), aspartame, monosodium glutamate (MSG), and some additives found in foods (such as hot dogs, brush, cold cuts, aged cheeses, and pickled foods). · Limit caffeine. Don't drink too much coffee, tea, or soda. But don't quit caffeine suddenly. That can also give you migraines. · Do not smoke or allow others to smoke around you. If you need help quitting, talk to your doctor about stop-smoking programs and medicines. These can increase your chances of quitting for good. · If you are taking birth control pills or hormone therapy, talk to your doctor about whether they are triggering your migraines. When should you call for help? Call 911 anytime you think you may need emergency care. For example, call if: 
· You have signs of a stroke. These may include: 
¨ Sudden numbness, paralysis, or weakness in your face, arm, or leg, especially on only one side of your body. ¨ Sudden vision changes. ¨ Sudden trouble speaking. ¨ Sudden confusion or trouble understanding simple statements. ¨ Sudden problems with walking or balance. ¨ A sudden, severe headache that is different from past headaches. Call your doctor now or seek immediate medical care if: 
· You have new or worse nausea and vomiting. · You have a new or higher fever. · Your headache gets much worse. Watch closely for changes in your health, and be sure to contact your doctor if: 
· You are not getting better after 2 days (48 hours). Where can you learn more? Go to http://jurgen-tammie.info/. Enter O026 in the search box to learn more about \"Migraine Headache: Care Instructions. \" Current as of: October 14, 2016 Content Version: 11.3 © 9250-2379 [email or web address].cn. Care instructions adapted under license by [employer] (which disclaims liability or warranty for this information). If you have questions about a medical condition or this instruction, always ask your healthcare professional. [place] [address] any warranty or liability for your use of this information. Fibrocystic Breast Changes: Care Instructions Your Care Instructions Fibrocystic breast changes cause many small lumps to form in your breast. Some areas of your breast may feel thicker or denser than other areas. Your breasts also may feel sore or tender. You may notice lumps in both breasts around the nipple and in the upper, outer part of the breasts, especially before your menstrual period. The lumps may come and go and change size in just a few days. Fibrocystic breast changes are normal and are not cancer. Treatment is not usually needed. If you have a hard, grainy lump, unusual pain, or nipple discharge, your doctor may order tests to look for a more serious problem. Talk to your doctor about the need for regular mammograms. Follow-up care is a key part of your treatment and safety. Be sure to make and go to all appointments, and call your doctor if you are having problems. Its also a good idea to know your test results and keep a list of the medicines you take. How can you care for yourself at home? · Take an over-the-counter pain medicine, such as acetaminophen (Tylenol), ibuprofen (Advil, Motrin), or naproxen (Aleve). Read and follow all instructions on the label. · Do not take two or more pain medicines at the same time unless the doctor told you to. Many pain medicines have acetaminophen, which is Tylenol. Too much acetaminophen (Tylenol) can be harmful. · Wear a supportive bra, such as a sports bra or jog bra. · You may want to limit caffeine. Some women say that cutting back on caffeine reduces breast tenderness. · A diet very low in fat (about 15% of daily diet) may reduce breast tenderness. Talk to your doctor about whether you should try a very low-fat diet. When should you call for help? Watch closely for changes in your health, and be sure to contact your doctor if: 
· You have a fever. · You have swelling, redness, or pain. · You have discharge from your nipple that looks like pus or blood. · You have a new, hard lump in your breast. 
Where can you learn more? Go to http://jurgen-tammie.info/. Enter R979 in the search box to learn more about \"Fibrocystic Breast Changes: Care Instructions. \" Current as of: October 13, 2016 Content Version: 11.3 © 0203-0747 [employer]Mail. Care instructions adapted under license by [employer] (which disclaims liability or warranty for this information). If you have questions about a medical condition or this instruction, always ask your healthcare professional. [address] any warranty or liability for your use of this information. Introducing \[facility] [place] [facility]\"" & HEALTH SERVICES! Evelina Harvey introduces [employer] patient portal. Now you can access parts of your medical record, email your doctor's office, and request medication refills online. 1. In your internet browser, go to [email or web address] 2. Click on the First Time User? Click Here link in the Sign In box. You will see the New Member Sign Up page. 3. Enter your [employer] Access Code exactly as it appears below. You will not need to use this code after youve completed the sign-up process. If you do not sign up before the expiration date, you must request a new code. · [employer] Access Code: [email or web address] Expires: 1/18/2018  2:55 PM 
 
4. Enter the last four digits of your Social Security Number (xxxx) and Date of Birth (mm/dd/yyyy) as indicated and click Submit. You will be taken to the next sign-up page. 5. Create a Arthenat ID. This will be your [employer] login ID and cannot be changed, so think of one that is secure and easy to remember. 6. Create a [employer] password. You can change your password at any time. 7. Enter your Password Reset Question and Answer. This can be used at a later time if you forget your password. 8. Enter your e-mail address.  You will receive e-mail notification when new information is available in "[employer]. 9. Click Sign Up. You can now view and download portions of your medical record. 10. Click the Download Summary menu link to download a portable copy of your medical information. If you have questions, please visit the Frequently Asked Questions section of the "[employer] website. Remember, "[employer] is NOT to be used for urgent needs. For medical emergencies, dial 911. Now available from your iPhone and Android! Please provide this summary of care documentation to your next provider. Your primary care clinician is listed as Corky Wells. If you have any questions after today's visit, please call [phone]. 27-Jan-2019 07:06

## 2019-01-27 NOTE — ED PROVIDER NOTE - SKIN, MLM
Skin normal color for race, warm, dry and intact. erythematous,, pruritic rash to bl sides of superior forehead, pruritus diffusely to chest bl arms, neck, and legs

## 2019-01-27 NOTE — ED PROVIDER NOTE - NSFOLLOWUPINSTRUCTIONS_ED_ALL_ED_FT
Follow up with your primary doctor and allergist.  Discuss enlarged right posterior auricular lymph nodes with your primary doctor and have them reevaluated.  Take benadryl 25mg every 6 hours for the next 2 days and then as needed for itching.  Return immediately if itching worsens or if you develop difficulty breathing or swallowing, facial swelling, nausea, vomiting, or abdominal pain.

## 2019-01-27 NOTE — ED ADULT NURSE NOTE - NSIMPLEMENTINTERV_GEN_ALL_ED
Implemented All Universal Safety Interventions:  [place] to call system. Call bell, personal items and telephone within reach. Instruct patient to call for assistance. Room bathroom lighting operational. Non-slip footwear when patient is off stretcher. Physically safe environment: no spills, clutter or unnecessary equipment. Stretcher in lowest position, wheels locked, appropriate side rails in place.

## 2019-01-27 NOTE — ED PROVIDER NOTE - PROGRESS NOTE DETAILS
[facility]: pt showered and washed out hair. She endorses improvement in symptoms. No longer has difficulty breathing or swallowing. Agrees to follow with PMD and discuss lymph node enlargement with pmd. Agrees to not return to salon and to follow with allergist.

## 2019-01-27 NOTE — ED PROVIDER NOTE - CARE PLAN
Principal Discharge DX:	Allergic reaction  Assessment and plan of treatment:	The patient was given verbal and written discharge instructions. Specifically, instructions when to return to the ED and when to seek follow-up from their pcp was discussed. Any specialty follow-up was discussed, including how to make an appointment.  Instructions were discussed in simple, plain language and was understood by the patient. The patient understands that should their symptoms worsen or any new symptoms arise, they should return to the ED immediately for further evaluation.

## 2019-01-27 NOTE — ED PROVIDER NOTE - ATTENDING CONTRIBUTION TO CARE
I was physically present for the E/M service provided. I agree with above history, physical, and plan which I have reviewed and edited where appropriate. I was physically present for the key portions of the service provided.    Lorenzo: 33yF hx peanut allergy, hypothyroidism p/w diffuse body itching, difficulty swallowing, tender areas of swelling to face x 2 days after having chemicals placed onto her scalp/hair by her salon.  pt states happen in past but didn't think it will happen again.  went back to salon and they placed hydrocortisone cream on scalp and told to go home. pt denies any syncope, gi sx, no new pets, detergent, food or meds.  tried benadryl with minimal relieve    *GEN:   comfortable, in no apparent distress, AOx3  *EYES:   PERRL, extra-occular movements intact  *HEENT:   airway patent, moist mucosal membranes, uvula midline, no stridor, able to phonate E and A  *CV:   regular rate and rhythm, normal S1/S2, no murmur  *RESP:   clear to auscultation bilaterally, non-labored, speaking in full sentences  *ABD:   soft, non tender, no guarding  *MSK:   no musculoskeletal tenderness, 5/5 strength, moving all extremity  *SKIN:   dry, intact, no rash  *NEURO:   AOx3, no focal weakness or loss of sensation, gait normal, GCS 15  *scalp: whilte cream at scalp line with erythema and inflammed areas, + diffuse LAD at neck and cervical chain    a/p: allergic reaction to hair product.  decadron, benadryl, pepcid, ns, instruct to wash the chemical in ed. avoid re-exposure.

## 2019-01-31 NOTE — DISCUSSION/SUMMARY
[ED Visit] : a visit to ED [Follow Up Call to Patient's Family] : a follow up call to patient's family [FreeTextEntry1] : Pt has history of angioedema/urticaria previously followed by [facility], who presented to the ER with facial swelling after having her hair dyed. Symptoms improved after showering, benadryl, pepcid and decadron. Pt d/ trav. Attempted to call pt without answer. Will task  to schedule f/u appt with [facility]

## 2019-02-21 ENCOUNTER — OTHER (OUTPATIENT)
Age: 34
End: 2019-02-21

## 2019-02-21 ENCOUNTER — RESULT CHARGE (OUTPATIENT)
Age: 34
End: 2019-02-21

## 2019-02-21 ENCOUNTER — APPOINTMENT (OUTPATIENT)
Dept: INTERNAL MEDICINE | Facility: HOSPITAL | Age: 34
End: 2019-02-21
Payer: MEDICAID

## 2019-02-21 ENCOUNTER — OUTPATIENT (OUTPATIENT)
Dept: OUTPATIENT SERVICES | Facility: HOSPITAL | Age: 34
LOS: 1 days | End: 2019-02-21

## 2019-02-21 ENCOUNTER — EMERGENCY (EMERGENCY)
Facility: HOSPITAL | Age: 34
LOS: 1 days | Discharge: HOME | End: 2019-02-21
Admitting: EMERGENCY MEDICINE
Payer: MEDICAID

## 2019-02-21 VITALS
DIASTOLIC BLOOD PRESSURE: 69 MMHG | SYSTOLIC BLOOD PRESSURE: 137 MMHG | OXYGEN SATURATION: 100 % | RESPIRATION RATE: 16 BRPM | HEART RATE: 82 BPM | TEMPERATURE: 98 F

## 2019-02-21 VITALS — DIASTOLIC BLOOD PRESSURE: 82 MMHG | HEART RATE: 106 BPM | RESPIRATION RATE: 16 BRPM | SYSTOLIC BLOOD PRESSURE: 135 MMHG

## 2019-02-21 VITALS
SYSTOLIC BLOOD PRESSURE: 128 MMHG | OXYGEN SATURATION: 100 % | HEART RATE: 100 BPM | RESPIRATION RATE: 16 BRPM | DIASTOLIC BLOOD PRESSURE: 76 MMHG

## 2019-02-21 VITALS — HEIGHT: 63 IN | BODY MASS INDEX: 48.2 KG/M2 | WEIGHT: 272 LBS

## 2019-02-21 DIAGNOSIS — Z00.00 ENCOUNTER FOR GENERAL ADULT MEDICAL EXAMINATION W/OUT ABNORMAL FINDINGS: ICD-10-CM

## 2019-02-21 DIAGNOSIS — R42 DIZZINESS AND GIDDINESS: ICD-10-CM

## 2019-02-21 DIAGNOSIS — R10.32 LEFT LOWER QUADRANT PAIN: ICD-10-CM

## 2019-02-21 DIAGNOSIS — Z98.890 OTHER SPECIFIED POSTPROCEDURAL STATES: Chronic | ICD-10-CM

## 2019-02-21 DIAGNOSIS — F41.9 ANXIETY DISORDER, UNSPECIFIED: ICD-10-CM

## 2019-02-21 DIAGNOSIS — T78.3XXA ANGIONEUROTIC EDEMA, INITIAL ENCOUNTER: ICD-10-CM

## 2019-02-21 DIAGNOSIS — Z32.01 ENCOUNTER FOR PREGNANCY TEST, RESULT POSITIVE: ICD-10-CM

## 2019-02-21 LAB
ALBUMIN SERPL ELPH-MCNC: 3.8 G/DL — SIGNIFICANT CHANGE UP (ref 3.3–5)
ALP SERPL-CCNC: 46 U/L — SIGNIFICANT CHANGE UP (ref 40–120)
ALT FLD-CCNC: 28 U/L — SIGNIFICANT CHANGE UP (ref 4–33)
ANION GAP SERPL CALC-SCNC: 17 MMO/L — HIGH (ref 7–14)
APPEARANCE UR: CLEAR — SIGNIFICANT CHANGE UP
APTT BLD: 30.3 SEC — SIGNIFICANT CHANGE UP (ref 27.5–36.3)
AST SERPL-CCNC: 21 U/L — SIGNIFICANT CHANGE UP (ref 4–32)
BASOPHILS # BLD AUTO: 0.04 K/UL — SIGNIFICANT CHANGE UP (ref 0–0.2)
BASOPHILS NFR BLD AUTO: 0.3 % — SIGNIFICANT CHANGE UP (ref 0–2)
BILIRUB SERPL-MCNC: < 0.2 MG/DL — LOW (ref 0.2–1.2)
BILIRUB UR-MCNC: NEGATIVE — SIGNIFICANT CHANGE UP
BLD GP AB SCN SERPL QL: NEGATIVE — SIGNIFICANT CHANGE UP
BLOOD UR QL VISUAL: NEGATIVE — SIGNIFICANT CHANGE UP
BUN SERPL-MCNC: 11 MG/DL — SIGNIFICANT CHANGE UP (ref 7–23)
CALCIUM SERPL-MCNC: 9.2 MG/DL — SIGNIFICANT CHANGE UP (ref 8.4–10.5)
CHLORIDE SERPL-SCNC: 101 MMOL/L — SIGNIFICANT CHANGE UP (ref 98–107)
CO2 SERPL-SCNC: 21 MMOL/L — LOW (ref 22–31)
COLOR SPEC: SIGNIFICANT CHANGE UP
CREAT SERPL-MCNC: 0.98 MG/DL — SIGNIFICANT CHANGE UP (ref 0.5–1.3)
EOSINOPHIL # BLD AUTO: 0.22 K/UL — SIGNIFICANT CHANGE UP (ref 0–0.5)
EOSINOPHIL NFR BLD AUTO: 1.7 % — SIGNIFICANT CHANGE UP (ref 0–6)
GLUCOSE SERPL-MCNC: 84 MG/DL — SIGNIFICANT CHANGE UP (ref 70–99)
GLUCOSE UR-MCNC: NEGATIVE — SIGNIFICANT CHANGE UP
HCG SERPL-ACNC: SIGNIFICANT CHANGE UP MIU/ML
HCT VFR BLD CALC: 40.6 % — SIGNIFICANT CHANGE UP (ref 34.5–45)
HGB BLD-MCNC: 12.9 G/DL — SIGNIFICANT CHANGE UP (ref 11.5–15.5)
IMM GRANULOCYTES NFR BLD AUTO: 0.3 % — SIGNIFICANT CHANGE UP (ref 0–1.5)
INR BLD: 0.99 — SIGNIFICANT CHANGE UP (ref 0.88–1.17)
KETONES UR-MCNC: NEGATIVE — SIGNIFICANT CHANGE UP
LEUKOCYTE ESTERASE UR-ACNC: NEGATIVE — SIGNIFICANT CHANGE UP
LYMPHOCYTES # BLD AUTO: 26.2 % — SIGNIFICANT CHANGE UP (ref 13–44)
LYMPHOCYTES # BLD AUTO: 3.32 K/UL — HIGH (ref 1–3.3)
MCHC RBC-ENTMCNC: 29.4 PG — SIGNIFICANT CHANGE UP (ref 27–34)
MCHC RBC-ENTMCNC: 31.8 % — LOW (ref 32–36)
MCV RBC AUTO: 92.5 FL — SIGNIFICANT CHANGE UP (ref 80–100)
MONOCYTES # BLD AUTO: 0.65 K/UL — SIGNIFICANT CHANGE UP (ref 0–0.9)
MONOCYTES NFR BLD AUTO: 5.1 % — SIGNIFICANT CHANGE UP (ref 2–14)
NEUTROPHILS # BLD AUTO: 8.4 K/UL — HIGH (ref 1.8–7.4)
NEUTROPHILS NFR BLD AUTO: 66.4 % — SIGNIFICANT CHANGE UP (ref 43–77)
NITRITE UR-MCNC: NEGATIVE — SIGNIFICANT CHANGE UP
NRBC # FLD: 0 K/UL — LOW (ref 25–125)
PH UR: 6 — SIGNIFICANT CHANGE UP (ref 5–8)
PLATELET # BLD AUTO: 324 K/UL — SIGNIFICANT CHANGE UP (ref 150–400)
PMV BLD: 10.1 FL — SIGNIFICANT CHANGE UP (ref 7–13)
POTASSIUM SERPL-MCNC: 4.2 MMOL/L — SIGNIFICANT CHANGE UP (ref 3.5–5.3)
POTASSIUM SERPL-SCNC: 4.2 MMOL/L — SIGNIFICANT CHANGE UP (ref 3.5–5.3)
PROT SERPL-MCNC: 6.6 G/DL — SIGNIFICANT CHANGE UP (ref 6–8.3)
PROT UR-MCNC: NEGATIVE — SIGNIFICANT CHANGE UP
PROTHROM AB SERPL-ACNC: 11 SEC — SIGNIFICANT CHANGE UP (ref 9.8–13.1)
RBC # BLD: 4.39 M/UL — SIGNIFICANT CHANGE UP (ref 3.8–5.2)
RBC # FLD: 13.5 % — SIGNIFICANT CHANGE UP (ref 10.3–14.5)
RH IG SCN BLD-IMP: POSITIVE — SIGNIFICANT CHANGE UP
SODIUM SERPL-SCNC: 139 MMOL/L — SIGNIFICANT CHANGE UP (ref 135–145)
SP GR SPEC: 1.02 — SIGNIFICANT CHANGE UP (ref 1–1.04)
UROBILINOGEN FLD QL: NORMAL — SIGNIFICANT CHANGE UP
WBC # BLD: 12.67 K/UL — HIGH (ref 3.8–10.5)
WBC # FLD AUTO: 12.67 K/UL — HIGH (ref 3.8–10.5)

## 2019-02-21 PROCEDURE — 76817 TRANSVAGINAL US OBSTETRIC: CPT | Mod: 26

## 2019-02-21 PROCEDURE — 99214 OFFICE O/P EST MOD 30 MIN: CPT | Mod: GC

## 2019-02-21 PROCEDURE — 99284 EMERGENCY DEPT VISIT MOD MDM: CPT

## 2019-02-21 RX ORDER — ACETAMINOPHEN 325 MG/1
325 TABLET, FILM COATED ORAL EVERY 6 HOURS
Qty: 30 | Refills: 0 | Status: ACTIVE | COMMUNITY
Start: 2019-02-21 | End: 1900-01-01

## 2019-02-21 RX ORDER — IBUPROFEN 600 MG/1
600 TABLET, FILM COATED ORAL EVERY 6 HOURS
Qty: 30 | Refills: 0 | Status: ACTIVE | COMMUNITY
Start: 2019-02-21 | End: 1900-01-01

## 2019-02-21 NOTE — ED PROVIDER NOTE - PLAN OF CARE
Follow up with your Primary Medical Doctor within 2-3days. Follow up with OBGYN with in the week. If you have issues obtaining follow up, please call: [phone] to obtain a doctor or specialist who takes your insurance in your area.  If results or reports were given to you, show copies of your reports given to you. Take all of your medications as previously prescribed.   If any worsening pain vomiting fevers, bleeding that is excessive (2-3pads per hour) return to the ED.

## 2019-02-21 NOTE — ED PROVIDER NOTE - CARE PLAN
Principal Discharge DX:	Vaginal bleeding in pregnancy  Assessment and plan of treatment:	Follow up with your Primary Medical Doctor within 2-3days. Follow up with OBGYN with in the week. If you have issues obtaining follow up, please call: [phone] to obtain a doctor or specialist who takes your insurance in your area.  If results or reports were given to you, show copies of your reports given to you. Take all of your medications as previously prescribed.   If any worsening pain vomiting fevers, bleeding that is excessive (2-3pads per hour) return to the ED.

## 2019-02-21 NOTE — ED CLERICAL - NS ED CLERK NOTE PRE-ARRIVAL INFORMATION; ADDITIONAL PRE-ARRIVAL INFORMATION
sent from gyn clinic c/o llq pain pos ucg history of irregular periods last period was feb 1 sent to r/o ectopic

## 2019-02-21 NOTE — ED PROVIDER NOTE - OBJECTIVE STATEMENT
33 yo F , PMHx depression and anxiety (not on medication) presents to ED sent my PMD who found positive pregnancy test in the setting of LLQ pain and vaginal bleeding earlier today. Patient went to her PMD today for a follow up on an unrelated issue (resolved facial swelling) and admitted to recent hx of vaginal bleed, cramping, LLQ pain. Urine hcg taken was positive and patient was referred to ED for r/o ectopic. Patient states that she had some vaginal bleeding on  as well as lower abdominal cramping, denies clots or tissue. The vaginal bleeding stopped on Monday but cramping has persisted. This morning, she also woke up with sharp LLQ pain which is resolved at this time. No fever, chills, n/v/d, CP, SOB, vaginal bleeding, vaginal discharge, dysuria, hematuria, increased urinary frequency and has no other complaints at this time. Admits to history of unprotected intercourse, no on hormonal contraception. No hx of ectopic pregnancy, fibroids, ovarian cysts. Past pregnancies were uncomplicated.  Menstrual cycles are usually regular and come on the first of every month, no hx of heavy bleeding. LMP 19.

## 2019-02-21 NOTE — ED PROVIDER NOTE - GASTROINTESTINAL NEGATIVE STATEMENT, MLM
no bloating, no constipation, no diarrhea, no nausea and no vomiting. +lower abdominal cramping no abdominal pain, no bloating, no constipation, no diarrhea, no nausea and no vomiting.

## 2019-02-21 NOTE — ED PROVIDER NOTE - CLINICAL SUMMARY MEDICAL DECISION MAKING FREE TEXT BOX
35 yo F with positive pregnancy test and LLQ pain today sent for r/o ectopic. Patient with some mild lower abd cramping but otherwise asymptomatic at this time, no active vaginal bleeding. Plan serum bhcg, TVUS, UA, CBC, BMP.

## 2019-02-21 NOTE — ED PROVIDER NOTE - PROGRESS NOTE DETAILS
BARBRA Tinajero: H/H stable, HCG [id number], pt US with + IUP with , however low in uterus in setting of vaginal bleeding, likely ab in progress, pt will be dced home with OBGYN follow.

## 2019-02-21 NOTE — ED ADULT TRIAGE NOTE - CHIEF COMPLAINT QUOTE
Patient had a positive pregnancy test today , lmp feb 1st. c/o vaginal bleeding since Sunday with cramping.

## 2019-02-21 NOTE — ED PROVIDER NOTE - NSFOLLOWUPINSTRUCTIONS_ED_ALL_ED_FT
Follow up with your Primary Medical Doctor within 2-3days. Follow up with OBGYN with in the week. If you have issues obtaining follow up, please call: [phone] to obtain a doctor or specialist who takes your insurance in your area.  If results or reports were given to you, show copies of your reports given to you. Take all of your medications as previously prescribed.   If any worsening pain vomiting fevers, bleeding that is excessive (2-3pads per hour) return to the ED.

## 2019-02-22 NOTE — END OF VISIT
[] : Resident [FreeTextEntry3] : 34yoF presents to clinic today with c/o LLQ pain and vaginal bleeding. On exam, patient is tachycardic and has pain on palpation of LLQ and suprapubic area. POC urine pregnancy test +. Due to tachycardia, pain on exam, vaginal bleeding, and + pregnancy, concern for ectopic pregnancy - will send to the ER for further evaluation.

## 2019-02-22 NOTE — PHYSICAL EXAM
[No Acute Distress] : no acute distress [Well Nourished] : well nourished [Well Developed] : well developed [Well-Appearing] : well-appearing [Normal Sclera/Conjunctiva] : normal sclera/conjunctiva [PERRL] : pupils equal round and reactive to light [EOMI] : extraocular movements intact [Normal Outer Ear/Nose] : the outer ears and nose were normal in appearance [Normal Oropharynx] : the oropharynx was normal [No JVD] : no jugular venous distention [Supple] : supple [No Lymphadenopathy] : no lymphadenopathy [Thyroid Normal, No Nodules] : the thyroid was normal and there were no nodules present [No Respiratory Distress] : no respiratory distress  [Clear to Auscultation] : lungs were clear to auscultation bilaterally [No Accessory Muscle Use] : no accessory muscle use [Normal Rate] : normal rate  [Regular Rhythm] : with a regular rhythm [Normal S1, S2] : normal S1 and S2 [No Edema] : there was no peripheral edema [No Extremity Clubbing/Cyanosis] : no extremity clubbing/cyanosis [Soft] : abdomen soft [Non-distended] : non-distended [No Masses] : no abdominal mass palpated [No HSM] : no HSM [Normal Bowel Sounds] : normal bowel sounds [Normal Supraclavicular Nodes] : no supraclavicular lymphadenopathy [Normal Posterior Cervical Nodes] : no posterior cervical lymphadenopathy [Normal Anterior Cervical Nodes] : no anterior cervical lymphadenopathy [No CVA Tenderness] : no CVA  tenderness [No Spinal Tenderness] : no spinal tenderness [No Joint Swelling] : no joint swelling [Grossly Normal Strength/Tone] : grossly normal strength/tone [No Rash] : no rash [No Skin Lesions] : no skin lesions [Normal Gait] : normal gait [Coordination Grossly Intact] : coordination grossly intact [No Focal Deficits] : no focal deficits [Speech Grossly Normal] : speech grossly normal [Memory Grossly Normal] : memory grossly normal [Normal Affect] : the affect was normal [Alert and Oriented x3] : oriented to person, place, and time [Normal Mood] : the mood was normal [Normal Insight/Judgement] : insight and judgment were intact [de-identified] : +tenderness to LLQ and suprapubic area

## 2019-02-22 NOTE — ASSESSMENT
[FreeTextEntry1] : Pt is a 33 yo F with a PMH of hydradenitis suppurativa, irregular menses, anxiety/depression who presents to clinic today for LLQ pain and vaginal bleeding concerning for possible ectopic givne +urine pregnancy test. Sent patient to ER. \par \par 1. LLQ pain and vaginal bleeding\par - +urine preg test in office and tachycardic\par - Sent to ER and informed Resident in charge in ER Dr. Waggoner\par - Will need GYN follow-up for hysteroscopy or serial betas if all negative in ER\par \par 2. Anxiety / Depression\par - patient does not wish to continue Sertraline as it did not help\par - No depression symptoms but occasional anxiety symptoms will need Psychiatry follow-up as currently not seeing her therapist anymore as per patient request\par - RTC in 3 month for f/u of chronic medical problems\par \par 3. Chronic Urticaria/angioedema\par - Follow-up with A&I referal given today as patient was unable to follow-up due to custody monsalve for her children\par - Continue Antihistamines\par \par 4. Hydradenitis Suppurativa - No evidence of active infection at this time. \par - Continue current care - no evidence of flair\par \par 4. HCM\par - Pt w/ Normal Pap Smear HPV negative in 2014 - next in 2019\par - Tdap UTD\par - Deferred Influenza until next visit\par - Sent to ER and will follow-up lab work there\par \par Delbert ANNE PGY4\par D/W Dr. Mendieta

## 2019-02-22 NOTE — HISTORY OF PRESENT ILLNESS
[de-identified] : Pt is a 33 yo F with a PMH of hydradenitis suppurativa, irregular menses, anxiety/depression who presents to clinic today for LLQ pain and vaginal bleeding. Pt states on Sunday started to have intermittent LLQ pain and vaginal bleeding Sunday through Monday which has since resolved but patient still with pain. patient +nausea, +pre-syncope x 2 prior to coming in. No fever, no vomiting, +pain with deification on Monday but non since. no blood in stools. \par \par Pt just had Right rotator cuff Surgery on January 31st. Stated that she thinks her preg test was negative then and believes her last Menses was early February but she is unsure because she is irregular. Monogomous, no other vaginal discharge or foul odor, no dysuria or hematuria, no STIs in past. \par \par of note a few weeks ago had an episode of facial swelling due to hair product and went to ER but resolved with meds. Had not been able to follow-up with A&I due to custody monsalve for children but wants to see them now. \par \par Pt denies CP, [place], SOB, palpitations, melena, hematochezia, diarrhea, constipation, new rashes, new trauma/injuries/ulcers, no abdominal pain, no new headaches, no visual changes, no difficulties with ambulation, no new motor weaknesses, no new hair or skin changes, denies depression or feeling down/depressed, still enjoys doing her normal activities and hobbies, and no other complaints. \par \par +anxiety with palpations but no other symptoms and feels happy most days and does not feel depressed or down or hopeless and enjoys being with her children and doing other activities. Requesting psychiatry follow-up.

## 2019-02-22 NOTE — REVIEW OF SYSTEMS
[FreeTextEntry2] : See HPI [FreeTextEntry3] : See HPI [FreeTextEntry4] : See HPI [FreeTextEntry5] : See HPI [FreeTextEntry6] : See HPI [FreeTextEntry7] : See HPI [FreeTextEntry8] : See HPI [FreeTextEntry9] : See HPI [de-identified] : See HPI [de-identified] : See HPI [de-identified] : See HPI [de-identified] : See HPI

## 2019-02-25 ENCOUNTER — INPATIENT (INPATIENT)
Facility: HOSPITAL | Age: 34
LOS: 0 days | Discharge: HOME | End: 2019-02-26
Attending: OBSTETRICS & GYNECOLOGY | Admitting: OBSTETRICS & GYNECOLOGY
Payer: MEDICAID

## 2019-02-25 VITALS
OXYGEN SATURATION: 100 % | TEMPERATURE: 98 F | HEART RATE: 116 BPM | SYSTOLIC BLOOD PRESSURE: 145 MMHG | DIASTOLIC BLOOD PRESSURE: 79 MMHG | RESPIRATION RATE: 16 BRPM

## 2019-02-25 DIAGNOSIS — Z32.01 ENCOUNTER FOR PREGNANCY TEST, RESULT POSITIVE: ICD-10-CM

## 2019-02-25 DIAGNOSIS — R10.32 LEFT LOWER QUADRANT PAIN: ICD-10-CM

## 2019-02-25 DIAGNOSIS — R42 DIZZINESS AND GIDDINESS: ICD-10-CM

## 2019-02-25 DIAGNOSIS — D25.9 LEIOMYOMA OF UTERUS, UNSPECIFIED: ICD-10-CM

## 2019-02-25 DIAGNOSIS — Z00.00 ENCOUNTER FOR GENERAL ADULT MEDICAL EXAMINATION WITHOUT ABNORMAL FINDINGS: ICD-10-CM

## 2019-02-25 DIAGNOSIS — Z98.890 OTHER SPECIFIED POSTPROCEDURAL STATES: Chronic | ICD-10-CM

## 2019-02-25 DIAGNOSIS — O00.90 UNSPECIFIED ECTOPIC PREGNANCY WITHOUT INTRAUTERINE PREGNANCY: ICD-10-CM

## 2019-02-25 DIAGNOSIS — T78.3XXA ANGIONEUROTIC EDEMA, INITIAL ENCOUNTER: ICD-10-CM

## 2019-02-25 DIAGNOSIS — Z01.419 ENCOUNTER FOR GYNECOLOGICAL EXAMINATION (GENERAL) (ROUTINE) WITHOUT ABNORMAL FINDINGS: ICD-10-CM

## 2019-02-25 DIAGNOSIS — F41.9 ANXIETY DISORDER, UNSPECIFIED: ICD-10-CM

## 2019-02-25 LAB
ALBUMIN SERPL ELPH-MCNC: 3.6 G/DL — SIGNIFICANT CHANGE UP (ref 3.3–5)
ALP SERPL-CCNC: 41 U/L — SIGNIFICANT CHANGE UP (ref 40–120)
ALT FLD-CCNC: 30 U/L — SIGNIFICANT CHANGE UP (ref 4–33)
ANION GAP SERPL CALC-SCNC: 12 MMO/L — SIGNIFICANT CHANGE UP (ref 7–14)
APPEARANCE UR: SIGNIFICANT CHANGE UP
AST SERPL-CCNC: 28 U/L — SIGNIFICANT CHANGE UP (ref 4–32)
BACTERIA # UR AUTO: SIGNIFICANT CHANGE UP
BASOPHILS # BLD AUTO: 0.03 K/UL — SIGNIFICANT CHANGE UP (ref 0–0.2)
BASOPHILS NFR BLD AUTO: 0.2 % — SIGNIFICANT CHANGE UP (ref 0–2)
BILIRUB SERPL-MCNC: 0.2 MG/DL — SIGNIFICANT CHANGE UP (ref 0.2–1.2)
BILIRUB UR-MCNC: NEGATIVE — SIGNIFICANT CHANGE UP
BLOOD UR QL VISUAL: HIGH
BUN SERPL-MCNC: 11 MG/DL — SIGNIFICANT CHANGE UP (ref 7–23)
CALCIUM SERPL-MCNC: 9.1 MG/DL — SIGNIFICANT CHANGE UP (ref 8.4–10.5)
CHLORIDE SERPL-SCNC: 100 MMOL/L — SIGNIFICANT CHANGE UP (ref 98–107)
CO2 SERPL-SCNC: 24 MMOL/L — SIGNIFICANT CHANGE UP (ref 22–31)
COLOR SPEC: SIGNIFICANT CHANGE UP
CREAT SERPL-MCNC: 0.91 MG/DL — SIGNIFICANT CHANGE UP (ref 0.5–1.3)
EOSINOPHIL # BLD AUTO: 0.26 K/UL — SIGNIFICANT CHANGE UP (ref 0–0.5)
EOSINOPHIL NFR BLD AUTO: 1.8 % — SIGNIFICANT CHANGE UP (ref 0–6)
FLU A RESULT: NOT DETECTED — SIGNIFICANT CHANGE UP
FLU A RESULT: NOT DETECTED — SIGNIFICANT CHANGE UP
FLUAV AG NPH QL: NOT DETECTED — SIGNIFICANT CHANGE UP
FLUBV AG NPH QL: NOT DETECTED — SIGNIFICANT CHANGE UP
GLUCOSE SERPL-MCNC: 85 MG/DL — SIGNIFICANT CHANGE UP (ref 70–99)
GLUCOSE UR-MCNC: NEGATIVE — SIGNIFICANT CHANGE UP
HCG SERPL-ACNC: SIGNIFICANT CHANGE UP MIU/ML
HCT VFR BLD CALC: 41.7 % — SIGNIFICANT CHANGE UP (ref 34.5–45)
HGB BLD-MCNC: 13.3 G/DL — SIGNIFICANT CHANGE UP (ref 11.5–15.5)
IMM GRANULOCYTES NFR BLD AUTO: 0.4 % — SIGNIFICANT CHANGE UP (ref 0–1.5)
KETONES UR-MCNC: NEGATIVE — SIGNIFICANT CHANGE UP
LEUKOCYTE ESTERASE UR-ACNC: NEGATIVE — SIGNIFICANT CHANGE UP
LYMPHOCYTES # BLD AUTO: 26.7 % — SIGNIFICANT CHANGE UP (ref 13–44)
LYMPHOCYTES # BLD AUTO: 3.83 K/UL — HIGH (ref 1–3.3)
MAGNESIUM SERPL-MCNC: 1.9 MG/DL — SIGNIFICANT CHANGE UP (ref 1.6–2.6)
MCHC RBC-ENTMCNC: 29 PG — SIGNIFICANT CHANGE UP (ref 27–34)
MCHC RBC-ENTMCNC: 31.9 % — LOW (ref 32–36)
MCV RBC AUTO: 90.8 FL — SIGNIFICANT CHANGE UP (ref 80–100)
MONOCYTES # BLD AUTO: 0.88 K/UL — SIGNIFICANT CHANGE UP (ref 0–0.9)
MONOCYTES NFR BLD AUTO: 6.1 % — SIGNIFICANT CHANGE UP (ref 2–14)
NEUTROPHILS # BLD AUTO: 9.31 K/UL — HIGH (ref 1.8–7.4)
NEUTROPHILS NFR BLD AUTO: 64.8 % — SIGNIFICANT CHANGE UP (ref 43–77)
NITRITE UR-MCNC: NEGATIVE — SIGNIFICANT CHANGE UP
NRBC # FLD: 0 K/UL — LOW (ref 25–125)
PH UR: 6 — SIGNIFICANT CHANGE UP (ref 5–8)
PHOSPHATE SERPL-MCNC: 3.7 MG/DL — SIGNIFICANT CHANGE UP (ref 2.5–4.5)
PLATELET # BLD AUTO: 326 K/UL — SIGNIFICANT CHANGE UP (ref 150–400)
PMV BLD: 10.1 FL — SIGNIFICANT CHANGE UP (ref 7–13)
POTASSIUM SERPL-MCNC: 4.7 MMOL/L — SIGNIFICANT CHANGE UP (ref 3.5–5.3)
POTASSIUM SERPL-SCNC: 4.7 MMOL/L — SIGNIFICANT CHANGE UP (ref 3.5–5.3)
PROT SERPL-MCNC: 7.5 G/DL — SIGNIFICANT CHANGE UP (ref 6–8.3)
PROT UR-MCNC: 30 — SIGNIFICANT CHANGE UP
RBC # BLD: 4.59 M/UL — SIGNIFICANT CHANGE UP (ref 3.8–5.2)
RBC # FLD: 13.5 % — SIGNIFICANT CHANGE UP (ref 10.3–14.5)
RBC CASTS # UR COMP ASSIST: SIGNIFICANT CHANGE UP (ref 0–?)
RSV RESULT: SIGNIFICANT CHANGE UP
RSV RNA RESP QL NAA+PROBE: SIGNIFICANT CHANGE UP
SODIUM SERPL-SCNC: 136 MMOL/L — SIGNIFICANT CHANGE UP (ref 135–145)
SP GR SPEC: 1.01 — SIGNIFICANT CHANGE UP (ref 1–1.04)
SQUAMOUS # UR AUTO: SIGNIFICANT CHANGE UP
UROBILINOGEN FLD QL: NORMAL — SIGNIFICANT CHANGE UP
WBC # BLD: 14.37 K/UL — HIGH (ref 3.8–10.5)
WBC # FLD AUTO: 14.37 K/UL — HIGH (ref 3.8–10.5)
WBC UR QL: HIGH (ref 0–?)

## 2019-02-25 PROCEDURE — 71045 X-RAY EXAM CHEST 1 VIEW: CPT | Mod: 26

## 2019-02-25 PROCEDURE — 76817 TRANSVAGINAL US OBSTETRIC: CPT | Mod: 26

## 2019-02-25 PROCEDURE — 99222 1ST HOSP IP/OBS MODERATE 55: CPT

## 2019-02-25 RX ORDER — ACETAMINOPHEN 500 MG
975 TABLET ORAL ONCE
Qty: 0 | Refills: 0 | Status: COMPLETED | OUTPATIENT
Start: 2019-02-25 | End: 2019-02-25

## 2019-02-25 RX ORDER — METOCLOPRAMIDE HCL 10 MG
10 TABLET ORAL ONCE
Qty: 0 | Refills: 0 | Status: COMPLETED | OUTPATIENT
Start: 2019-02-25 | End: 2019-02-25

## 2019-02-25 RX ORDER — ACETAMINOPHEN 500 MG
650 TABLET ORAL EVERY 6 HOURS
Qty: 0 | Refills: 0 | Status: DISCONTINUED | OUTPATIENT
Start: 2019-02-25 | End: 2019-02-26

## 2019-02-25 RX ORDER — SODIUM CHLORIDE 9 MG/ML
1000 INJECTION INTRAMUSCULAR; INTRAVENOUS; SUBCUTANEOUS ONCE
Qty: 0 | Refills: 0 | Status: COMPLETED | OUTPATIENT
Start: 2019-02-25 | End: 2019-02-25

## 2019-02-25 RX ADMIN — Medication 10 MILLIGRAM(S): at 18:55

## 2019-02-25 RX ADMIN — Medication 975 MILLIGRAM(S): at 18:55

## 2019-02-25 RX ADMIN — SODIUM CHLORIDE 1000 MILLILITER(S): 9 INJECTION INTRAMUSCULAR; INTRAVENOUS; SUBCUTANEOUS at 18:57

## 2019-02-25 NOTE — H&P ADULT - ATTENDING COMMENTS
OBGYN Attending    Pt seen and evaluated in ER.  Agree with above.  Possible cervical ectopic, currently with pink staining.  VE deferred to avoid causing further bleeding.  Pt for admission and MFM consult in AM to determine pregnancy location and plan re: continuation of pregnancy vs. treatment of cervical ectopic.

## 2019-02-25 NOTE — H&P ADULT - ASSESSMENT
33 yo  pregnant female presenting with palpitations and dyspnea, found to have cervical ectopic pregnancy. Cardiac workup in ED negative for acute pathology. Prior TVUS showed pregnancy in lower uterine segment, requiring further evaluation of pregnancy in order to verify location of pregnancy.

## 2019-02-25 NOTE — H&P ADULT - NSHPLABSRESULTS_GEN_ALL_CORE
LABS:                        13.3   14.37 )-----------( 326      ( 2019 18:42 )             41.7         136  |  100  |  11  ----------------------------<  85  4.7   |  24  |  0.91    Ca    9.1      2019 18:42  Phos  3.7       Mg     1.9         TPro  7.5  /  Alb  3.6  /  TBili  0.2  /  DBili  x   /  AST  28  /  ALT  30  /  AlkPhos  41        Urinalysis Basic - ( 2019 19:08 )    Color: LIGHT ORANGE / Appearance: TURBID / S.009 / pH: 6.0  Gluc: NEGATIVE / Ketone: NEGATIVE  / Bili: NEGATIVE / Urobili: NORMAL   Blood: LARGE / Protein: 30 / Nitrite: NEGATIVE   Leuk Esterase: NEGATIVE / RBC: 0-2 / WBC 6-10   Sq Epi: MANY / Non Sq Epi: x / Bacteria: FEW        RADIOLOGY & ADDITIONAL STUDIES:  EXAM:  US OB TRANSVAGINAL      PROCEDURE DATE:  2019     INTERPRETATION:  CLINICAL INFORMATION: Vaginal bleeding, nausea    TECHNIQUE: Endovaginal pelvic sonogram.     COMPARISON: Pelvic sonogram 2019    LMP: 2019  Estimated Gestational Age by LMP: 3 weeks 3 days    FINDINGS:    Uterus:  A single live gestation is identified implanted within the cervix. The appearance is unchanged from 2019 A small amount of complex fluid/blood is identified within the endometrial cavity.  Crown Rump Length: 0.75 cm  Estimated Gestational Age: 6 weeks 5 days  Fetal Heart Rate: 139 bmp  Right ovary: 2.9 x 1.6 x 2.4 cm. Within normal limits. Flow/waveforms present.  Left ovary: 2.8 x 1.8 x 2.7 cm. 1.6 x 1.0 x 1.6 cm corpus luteum. Flow/waveforms present.  Free fluid: None.    IMPRESSION:    Live cervical ectopic pregnancy of 6.5 weeks.

## 2019-02-25 NOTE — ED PROVIDER NOTE - OBJECTIVE STATEMENT
34F denies pmh  at approximately 6wks gestation presents with shortness of breath and lower abdominal cramping pain.  Also c/o pink tinged urine.  Shortness of breath started yesterday.  Also endorses coughing and diarrhea x 3 days.  +subjective chills.  Denies CP. 34F denies pmh  at approximately 6wks gestation presents with sensation of "fluttering" in chest and lower abdominal cramping pain.  Also c/o pink tinged urine.  Triage note states shortness of breath, but patient states it is more a "fluttering" sensation rather than shortness of breath.  Denies SOB.  Also endorses coughing and diarrhea x 3 days.  +subjective chills.  Denies CP.

## 2019-02-25 NOTE — H&P ADULT - NSHPREVIEWOFSYSTEMS_GEN_ALL_CORE
REVIEW OF SYSTEMS  General:	denies fevers, chills, sweats, lightheadedness, dizziness  Respiratory and Thorax: +intermittent SOB  Cardiovascular: +palpitations. Denies CP  Gastrointestinal: +nausea. Denies emesis and abd pain  Genitourinary: +vaginal spotting. Denies dysuria

## 2019-02-25 NOTE — ED PROVIDER NOTE - PROGRESS NOTE DETAILS
PGY1/MD Nicola. Pt states that she found blood with urine, may be from vagina. Trans vaginal US identifies cervical ectopic pregnancy. OB/GYN was consulted, admission for ectopic pregnancy.

## 2019-02-25 NOTE — ED PROVIDER NOTE - CLINICAL SUMMARY MEDICAL DECISION MAKING FREE TEXT BOX
- SOB and cough, evaluate PNA vs viral URI; consider PE, will discuss work up with patient  - cbc, cmp, rvp, ua, urine culture  - TVUS, CXR - SOB and cough, evaluate PNA vs viral URI  - cbc, cmp, rvp, ua, urine culture  - TVUS, CXR - cough, evaluate PNA vs viral URI; "fluttering" sensation, eval for anemia and electrolyte abnl; pink tinged urine, threatened  vs UTI  - cbc, cmp, rvp, ua, urine culture  - TVUS, CXR

## 2019-02-25 NOTE — H&P ADULT - HISTORY OF PRESENT ILLNESS
34y  LMP 19 presents with         OB/GYN HISTORY:   [place]:  Parity:  Term:  :  MAB/TAB/Ectopic:  Living:    Last Menstrual Period    Name of GYN Physician:  Date of Last Pap:  History of Abnormal Pap:  Date of Last Mammogram:  Date of Last Colonoscopy:  Current OCP use:     PAST MEDICAL & SURGICAL HISTORY:  Anxiety  Depression  H/O shoulder surgery      REVIEW OF SYSTEMS      General:	    Skin/Breast:  	  Ophthalmologic:  	  ENMT:	    Respiratory and Thorax:  	  Cardiovascular:	    Gastrointestinal:	    Genitourinary:	    Musculoskeletal:	    Neurological:	    Psychiatric:	    Hematology/Lymphatics:	    Endocrine:	    Allergic/Immunologic:	    MEDICATIONS  (STANDING):  prenatal multivitamin 1 Tablet(s) Oral daily    MEDICATIONS  (PRN):  acetaminophen   Tablet .. 650 milliGRAM(s) Oral every 6 hours PRN Moderate Pain (4 - 6)      Allergies    No Known Drug Allergies  peanuts (Anaphylaxis)  Sesame (Unknown)    Intolerances        SOCIAL HISTORY:    FAMILY HISTORY:  Family history of cerebrovascular accident (CVA)      Vital Signs Last 24 Hrs  T(C): 37.7 (2019 23:14), Max: 37.7 (2019 23:14)  T(F): 99.8 (2019 23:14), Max: 99.8 (2019 23:14)  HR: 86 (2019 23:14) (86 - 116)  BP: 115/61 (2019 23:14) (115/61 - 145/79)  BP(mean): --  RR: 17 (2019 23:14) (16 - 17)  SpO2: 99% (2019 23:14) (99% - 100%)    PHYSICAL EXAM:      Constitutional: alert and oriented x 3    Breasts: no tenderness, no nodules    Respiratory: clear    Cardiovascular: regular rate and rhythm    Gastrointestinal: soft, non tender, + bowel sounds. No hepatosplenomegaly, no palpable masses    Genitourinary: NEFG  Cervix: closed/ long, no CMT  Uterus: normal size, non tender  Adnexa: non tender, no palpable masses    Rectal:     Extremities:    Neurological:    Skin:    Lymph Nodes:        LABS:                        13.3   14.37 )-----------( 326      ( 2019 18:42 )             41.7         136  |  100  |  11  ----------------------------<  85  4.7   |  24  |  0.91    Ca    9.1      2019 18:42  Phos  3.7       Mg     1.9         TPro  7.5  /  Alb  3.6  /  TBili  0.2  /  DBili  x   /  AST  28  /  ALT  30  /  AlkPhos  41        Urinalysis Basic - ( 2019 19:08 )    Color: LIGHT ORANGE / Appearance: TURBID / S.009 / pH: 6.0  Gluc: NEGATIVE / Ketone: NEGATIVE  / Bili: NEGATIVE / Urobili: NORMAL   Blood: LARGE / Protein: 30 / Nitrite: NEGATIVE   Leuk Esterase: NEGATIVE / RBC: 0-2 / WBC 6-10   Sq Epi: MANY / Non Sq Epi: x / Bacteria: FEW        RADIOLOGY & ADDITIONAL STUDIES: 34y  pregnant female LMP 19 presented to the ED with 2 days of intermittent palpitations, SOB and chills. PMHx anxiety on no medications at this time. She states that her symptoms are intermittent and states that the episodes resolve spontaneously after a few minutes. She denies any aggravating or relieving factors for these symptoms. She additionally reports occasional vaginal spotting that she has seen on tissue paper, but denies active vaginal bleeding. +chills, nausea. Denies fevers, chills, sweats, HA, changes in vision, CP, abd pain, dysuria, urinary urgency/frequency, changes in BM.     OB/GYN HISTORY:   [place]: 6  Parity: 3  Term: 3  FT  7#5oz,  FT  7#1oz,  FT  6#1oz  : 0  MAB/TAB/Ectopic: 2 ETOP ,   Living: 3    Last Menstrual Period 19, states that menstrual cycles are normal and cycle prior was 19    Name of GYN Physician: [facility]  Reports history of fibroids seen on ultrasound but asymptomatic   Denies abnl paps, ovarian cysts, STI, endometriosis     PAST MEDICAL & SURGICAL HISTORY:  Anxiety  Depression  H/O shoulder surgery - R shoulder surgery 19

## 2019-02-25 NOTE — H&P ADULT - NSHPPHYSICALEXAM_GEN_ALL_CORE
Vital Signs Last 24 Hrs  T(C): 37.7 (25 Feb 2019 23:14), Max: 37.7 (25 Feb 2019 23:14)  T(F): 99.8 (25 Feb 2019 23:14), Max: 99.8 (25 Feb 2019 23:14)  HR: 86 (25 Feb 2019 23:14) (86 - 116)  BP: 115/61 (25 Feb 2019 23:14) (115/61 - 145/79)  BP(mean): --  RR: 17 (25 Feb 2019 23:14) (16 - 17)  SpO2: 99% (25 Feb 2019 23:14) (99% - 100%)    PHYSICAL EXAM:  Constitutional: alert and oriented x 3  Respiratory: CTA-B, symmetrical expansion  Cardiovascular: regular rate and rhythm  Gastrointestinal: soft, non tender, + bowel sounds. No hepatosplenomegaly, no palpable masses

## 2019-02-25 NOTE — H&P ADULT - PROBLEM SELECTOR PLAN 1
- CV: hemodynamically stable. EKG NSR. Patient asymptomatic at this time  - Resp: CXR WNL, RVP negative  - GI: regular diet  - : pad counts, prenatal vitamin for pregnancy. Plan to consult MFM and repeat sono in AM to evaluate for ectopic pregnancy  - Heme: OOB+    seen with Dr. Dorene Dawkins pgy2

## 2019-02-26 ENCOUNTER — TRANSCRIPTION ENCOUNTER (OUTPATIENT)
Age: 34
End: 2019-02-26

## 2019-02-26 ENCOUNTER — ASOB RESULT (OUTPATIENT)
Age: 34
End: 2019-02-26

## 2019-02-26 ENCOUNTER — APPOINTMENT (OUTPATIENT)
Dept: ANTEPARTUM | Facility: HOSPITAL | Age: 34
End: 2019-02-26
Payer: MEDICAID

## 2019-02-26 ENCOUNTER — OUTPATIENT (OUTPATIENT)
Dept: OUTPATIENT SERVICES | Facility: HOSPITAL | Age: 34
LOS: 1 days | End: 2019-02-26

## 2019-02-26 ENCOUNTER — APPOINTMENT (OUTPATIENT)
Dept: ANTEPARTUM | Facility: CLINIC | Age: 34
End: 2019-02-26

## 2019-02-26 VITALS
SYSTOLIC BLOOD PRESSURE: 129 MMHG | RESPIRATION RATE: 18 BRPM | OXYGEN SATURATION: 99 % | DIASTOLIC BLOOD PRESSURE: 63 MMHG | HEART RATE: 90 BPM | TEMPERATURE: 99 F

## 2019-02-26 DIAGNOSIS — Z98.890 OTHER SPECIFIED POSTPROCEDURAL STATES: Chronic | ICD-10-CM

## 2019-02-26 DIAGNOSIS — O00.90 UNSPECIFIED ECTOPIC PREGNANCY WITHOUT INTRAUTERINE PREGNANCY: ICD-10-CM

## 2019-02-26 DIAGNOSIS — Z34.90 ENCOUNTER FOR SUPERVISION OF NORMAL PREGNANCY, UNSPECIFIED, UNSPECIFIED TRIMESTER: ICD-10-CM

## 2019-02-26 PROBLEM — F32.9 MAJOR DEPRESSIVE DISORDER, SINGLE EPISODE, UNSPECIFIED: Chronic | Status: ACTIVE | Noted: 2019-02-21

## 2019-02-26 PROBLEM — F41.9 ANXIETY DISORDER, UNSPECIFIED: Chronic | Status: ACTIVE | Noted: 2019-02-21

## 2019-02-26 LAB
B PERT DNA SPEC QL NAA+PROBE: NOT DETECTED — SIGNIFICANT CHANGE UP
BLD GP AB SCN SERPL QL: NEGATIVE — SIGNIFICANT CHANGE UP
C PNEUM DNA SPEC QL NAA+PROBE: NOT DETECTED — SIGNIFICANT CHANGE UP
FLUAV H1 2009 PAND RNA SPEC QL NAA+PROBE: NOT DETECTED — SIGNIFICANT CHANGE UP
FLUAV H1 RNA SPEC QL NAA+PROBE: NOT DETECTED — SIGNIFICANT CHANGE UP
FLUAV H3 RNA SPEC QL NAA+PROBE: NOT DETECTED — SIGNIFICANT CHANGE UP
FLUAV SUBTYP SPEC NAA+PROBE: NOT DETECTED — SIGNIFICANT CHANGE UP
FLUBV RNA SPEC QL NAA+PROBE: NOT DETECTED — SIGNIFICANT CHANGE UP
HADV DNA SPEC QL NAA+PROBE: NOT DETECTED — SIGNIFICANT CHANGE UP
HCOV PNL SPEC NAA+PROBE: SIGNIFICANT CHANGE UP
HCT VFR BLD CALC: 37.6 % — SIGNIFICANT CHANGE UP (ref 34.5–45)
HGB BLD-MCNC: 12 G/DL — SIGNIFICANT CHANGE UP (ref 11.5–15.5)
HMPV RNA SPEC QL NAA+PROBE: NOT DETECTED — SIGNIFICANT CHANGE UP
HPIV1 RNA SPEC QL NAA+PROBE: NOT DETECTED — SIGNIFICANT CHANGE UP
HPIV2 RNA SPEC QL NAA+PROBE: NOT DETECTED — SIGNIFICANT CHANGE UP
HPIV3 RNA SPEC QL NAA+PROBE: NOT DETECTED — SIGNIFICANT CHANGE UP
HPIV4 RNA SPEC QL NAA+PROBE: NOT DETECTED — SIGNIFICANT CHANGE UP
MCHC RBC-ENTMCNC: 29.6 PG — SIGNIFICANT CHANGE UP (ref 27–34)
MCHC RBC-ENTMCNC: 31.9 % — LOW (ref 32–36)
MCV RBC AUTO: 92.6 FL — SIGNIFICANT CHANGE UP (ref 80–100)
NRBC # FLD: 0 K/UL — LOW (ref 25–125)
PLATELET # BLD AUTO: 289 K/UL — SIGNIFICANT CHANGE UP (ref 150–400)
PMV BLD: 10.2 FL — SIGNIFICANT CHANGE UP (ref 7–13)
RBC # BLD: 4.06 M/UL — SIGNIFICANT CHANGE UP (ref 3.8–5.2)
RBC # FLD: 13.5 % — SIGNIFICANT CHANGE UP (ref 10.3–14.5)
RH IG SCN BLD-IMP: POSITIVE — SIGNIFICANT CHANGE UP
RSV RNA SPEC QL NAA+PROBE: NOT DETECTED — SIGNIFICANT CHANGE UP
RV+EV RNA SPEC QL NAA+PROBE: NOT DETECTED — SIGNIFICANT CHANGE UP
WBC # BLD: 11.49 K/UL — HIGH (ref 3.8–10.5)
WBC # FLD AUTO: 11.49 K/UL — HIGH (ref 3.8–10.5)

## 2019-02-26 PROCEDURE — 99238 HOSP IP/OBS DSCHRG MGMT 30/<: CPT

## 2019-02-26 PROCEDURE — 76817 TRANSVAGINAL US OBSTETRIC: CPT | Mod: 26

## 2019-02-26 PROCEDURE — 76801 OB US < 14 WKS SINGLE FETUS: CPT | Mod: 26

## 2019-02-26 RX ORDER — DOCUSATE SODIUM 100 MG
100 CAPSULE ORAL THREE TIMES A DAY
Qty: 0 | Refills: 0 | Status: DISCONTINUED | OUTPATIENT
Start: 2019-02-26 | End: 2019-02-26

## 2019-02-26 RX ADMIN — Medication 650 MILLIGRAM(S): at 17:16

## 2019-02-26 RX ADMIN — Medication 650 MILLIGRAM(S): at 16:40

## 2019-02-26 RX ADMIN — Medication 1 TABLET(S): at 09:19

## 2019-02-26 NOTE — DISCHARGE NOTE ANTEPARTUM - HOSPITAL COURSE
Patient admitted with SOB and palpitations. EKG and CXR wnl. Patient with known pregnancy. Sonogram on admission concerning for cervical ectopic pregnancy. Patient underwent repeat sonogram in  testing unit showing single live pregnancy in lower uterine segment. Patient discharged home in stable condition with follow up sonogram on Monday in ATU for viability and dating.

## 2019-02-26 NOTE — DISCHARGE NOTE ANTEPARTUM - CHANGE OR INCREASE IN VAGINAL DISCHARGE
Telephone Encounter by Chandler Weber at 11/02/18 03:40 PM     Author:  Danny Delgado MA Service:  (none) Author Type:  Medical Assistant     Filed:  11/02/18 03:40 PM Encounter Date:  11/2/2018 Status:  Signed     :  Chandler Weber (Medical Assistant)       From: Sophie Ralph  To: Erika Hutton MD  Sent: 11/2/2018  2:01 PM CDT  Subject: Upcoming Appointment    Hi Dr Roxana Kumar    I just wanted to let you know that I have an appointment scheduled for   Tues, Nov 13 at 6 pm. I apologize for being difficult. We are facing   severe financial difficulties and our medical insurance is a very limited   policy. Could you please put in for a refill for Atenolol and Atorvastatin to   [facility] in [facility]. I'm trying to get the drug companies to  my injectable drugs Gaylan Darwin, Victoza and Avonex) They might be calling you for the   prescriptions for those. I do have my first neurolgist appointment  on Nov 13 as well , but I take my last Avonex shot tonight so will need a refill   before then. I'm about 6 months away from Medicare and hopefully the insurance   nightmare will go away. Thank you for your help    Shagufta Zavala  [phone]       Revision History        Date/Time User Provider Type Action    > 11/02/18 03:40 PM Danny Delgado MA Medical Assistant Sign    Attribution information within the note text is not available. Statement Selected

## 2019-02-26 NOTE — PROGRESS NOTE ADULT - ASSESSMENT
A/P: 35y/o  female presented w/ palpitations and SOB overnight and was found to have cervical ectopic pregnancy. Cardiac workup in ED negative for acute pathology. EKG and CXR wnl. A prior TVUS showed pregnancy in lower uterine segment. Repeat TVUS overnight demonstrated findings concerning for a cervical ectopic measuring 6.5wks. A/P: 33y/o  female presented w/ palpitations and SOB overnight and was found to have cervical ectopic pregnancy. Cardiac workup in ED negative for acute pathology. EKG and CXR wnl. This AM denies persistence of these symptoms. Of note, a prior TVUS showed pregnancy in lower uterine segment. Repeat TVUS overnight demonstrated findings concerning for a cervical ectopic measuring 6.5wks.

## 2019-02-26 NOTE — DISCHARGE NOTE ANTEPARTUM - CARE PLAN
Principal Discharge DX:	Less than 8 weeks gestation of pregnancy  Goal:	Home  Assessment and plan of treatment:	Prenatal care as recommended

## 2019-02-26 NOTE — DISCHARGE NOTE ANTEPARTUM - CARE PROVIDER_API CALL
[facility],   3rd Floor   [address] [place], [place]  Phone: [phone]  Fax: (   )    -  Follow Up Time:

## 2019-02-26 NOTE — DISCHARGE NOTE ANTEPARTUM - PROVIDER TOKENS
FREE:[LAST:[[facility]],PHONE:[phone]],FAX:[(   )    -],ADDRESS:[3rd Floor   [address] [place] [address]]]

## 2019-02-26 NOTE — DISCHARGE NOTE ADULT - PATIENT PORTAL LINK FT
You can access the [facility] Patient Portal, offered by [facility], by registering with the following website: [email or web address]

## 2019-02-26 NOTE — PROGRESS NOTE ADULT - ATTENDING COMMENTS
Pt seen and examined by me.  Agree with above assessment and plan.  Findings c/w CARLOS early pregnancy.  Precautions given.  D/c home with close f/up

## 2019-02-26 NOTE — DISCHARGE NOTE ANTEPARTUM - PATIENT PORTAL LINK FT
You can access the [facility] Portal, offered by [facility], by registering with the following website: [email or web address]

## 2019-02-26 NOTE — PROGRESS NOTE ADULT - SUBJECTIVE AND OBJECTIVE BOX
R1 OBGYN Progress Note: HD# 2    Patient seen and examined at bedside.  No acute events overnight. No acute complaints.  Denies pain at this time. Patient is ambulating and tolerating regular diet. Patient is passing flatus and voiding spontaneously. Denies CP, SOB, palpitations, N/V, fevers, and chills.    Vital Signs Last 24 Hours  T(C): 37.2 (02-26-19 @ 05:31), Max: 37.7 (02-25-19 @ 23:14)  HR: 84 (02-26-19 @ 05:31) (84 - 116)  BP: 136/68 (02-26-19 @ 05:31) (110/64 - 145/79)  RR: 17 (02-26-19 @ 05:31) (16 - 17)  SpO2: 98% (02-26-19 @ 05:31) (98% - 100%)    I&O's Summary    25 Feb 2019 07:01  -  26 Feb 2019 07:00  --------------------------------------------------------  IN: 0 mL / OUT: 0 mL / NET: 0 mL        Physical Exam:  General: NAD  CV: RR, S1, S2, no M/R/G  Lungs: CTA b/l, good air flow b/l   Abdomen: Soft, Non-tender, non-distended, normoactive bowel sounds  Incision: CDI  : no bleeding on pad  Ext: No pain or swelling     Labs:                        12.0   11.49 )-----------( 289      ( 26 Feb 2019 06:15 )             37.6   baso x      eos x      imm gran x      lymph x      mono x      poly x                            13.3   14.37 )-----------( 326      ( 25 Feb 2019 18:42 )             41.7   baso 0.2    eos 1.8    imm gran 0.4    lymph 26.7   mono 6.1    poly 64.8       MEDICATIONS  (STANDING):  prenatal multivitamin 1 Tablet(s) Oral daily    MEDICATIONS  (PRN):  acetaminophen   Tablet .. 650 milliGRAM(s) Oral every 6 hours PRN Moderate Pain (4 - 6)    RADIOLOGY IMAGING:     EXAM:  US OB TRANSVAGINAL      PROCEDURE DATE:  Feb 25 2019       INTERPRETATION:  CLINICAL INFORMATION: Vaginal bleeding, nausea    TECHNIQUE: Endovaginal pelvic sonogram.     COMPARISON: Pelvic sonogram 2/21/2019    LMP: 2/1/2019  Estimated Gestational Age by LMP: 3 weeks 3 days    FINDINGS:    Uterus:  A single live gestation is identified implanted within the cervix. The   appearance is unchanged from 02/21/2019 A small amount of complex   fluid/blood is identified within the endometrial cavity.    Crown Rump Length: 0.75 cm  Estimated Gestational Age: 6 weeks 5 days  Fetal Heart Rate: 139 bmp    Right ovary: 2.9 x 1.6 x 2.4 cm. Within normal limits. Flow/waveforms   present.  Left ovary: 2.8 x 1.8 x 2.7 cm. 1.6 x 1.0 x 1.6 cm corpus luteum.   Flow/waveforms present.  Free fluid: None.    IMPRESSION:    Live cervical ectopic pregnancy of 6.5 weeks. R1 OBGYN Progress Note: HD# 2    Patient seen and examined at bedside.  No acute events overnight. No acute complaints.  Denies pain at this time. Patient is ambulating and tolerating regular diet. Patient is voiding spontaneously. Denies CP, SOB, palpitations, N/V, fevers, and chills.    Vital Signs Last 24 Hours  T(C): 37.2 (02-26-19 @ 05:31), Max: 37.7 (02-25-19 @ 23:14)  HR: 84 (02-26-19 @ 05:31) (84 - 116)  BP: 136/68 (02-26-19 @ 05:31) (110/64 - 145/79)  RR: 17 (02-26-19 @ 05:31) (16 - 17)  SpO2: 98% (02-26-19 @ 05:31) (98% - 100%)    I&O's Summary    25 Feb 2019 07:01  -  26 Feb 2019 07:00  --------------------------------------------------------  IN: 0 mL / OUT: 0 mL / NET: 0 mL        Physical Exam:  General: NAD  CV: RR, S1, S2, no M/R/G  Lungs: CTA b/l, good air flow b/l   Abdomen: Soft, Non-tender, non-distended, normoactive bowel sounds  : minimal bleeding on pad  Ext: No pain or swelling     Labs:                        12.0   11.49 )-----------( 289      ( 26 Feb 2019 06:15 )             37.6   baso x      eos x      imm gran x      lymph x      mono x      poly x                            13.3   14.37 )-----------( 326      ( 25 Feb 2019 18:42 )             41.7   baso 0.2    eos 1.8    imm gran 0.4    lymph 26.7   mono 6.1    poly 64.8       MEDICATIONS  (STANDING):  prenatal multivitamin 1 Tablet(s) Oral daily    MEDICATIONS  (PRN):  acetaminophen   Tablet .. 650 milliGRAM(s) Oral every 6 hours PRN Moderate Pain (4 - 6)    RADIOLOGY IMAGING:     EXAM:  US OB TRANSVAGINAL      PROCEDURE DATE:  Feb 25 2019       INTERPRETATION:  CLINICAL INFORMATION: Vaginal bleeding, nausea    TECHNIQUE: Endovaginal pelvic sonogram.     COMPARISON: Pelvic sonogram 2/21/2019    LMP: 2/1/2019  Estimated Gestational Age by LMP: 3 weeks 3 days    FINDINGS:    Uterus:  A single live gestation is identified implanted within the cervix. The   appearance is unchanged from 02/21/2019 A small amount of complex   fluid/blood is identified within the endometrial cavity.    Crown Rump Length: 0.75 cm  Estimated Gestational Age: 6 weeks 5 days  Fetal Heart Rate: 139 bmp    Right ovary: 2.9 x 1.6 x 2.4 cm. Within normal limits. Flow/waveforms   present.  Left ovary: 2.8 x 1.8 x 2.7 cm. 1.6 x 1.0 x 1.6 cm corpus luteum.   Flow/waveforms present.  Free fluid: None.    IMPRESSION:    Live cervical ectopic pregnancy of 6.5 weeks. R1 OBGYN Progress Note: HD# 2    Patient seen and examined at bedside.  No acute events overnight. No acute complaints.  Denies pain at this time. Patient is ambulating and tolerating regular diet. Patient is voiding spontaneously. Denies CP, SOB, palpitations, N/V, fevers, and chills.    Vital Signs Last 24 Hours  T(C): 37.2 (02-26-19 @ 05:31), Max: 37.7 (02-25-19 @ 23:14)  HR: 84 (02-26-19 @ 05:31) (84 - 116)  BP: 136/68 (02-26-19 @ 05:31) (110/64 - 145/79)  RR: 17 (02-26-19 @ 05:31) (16 - 17)  SpO2: 98% (02-26-19 @ 05:31) (98% - 100%)     I&O's Summary    25 Feb 2019 07:01  -  26 Feb 2019 07:00  --------------------------------------------------------  IN: 0 mL / OUT: 0 mL / NET: 0 mL        Physical Exam:  General: NAD  CV: RR, S1, S2, no M/R/G  Lungs: CTA b/l, good air flow b/l   Abdomen: Soft, Non-tender, non-distended, normoactive bowel sounds  : minimal bleeding on pad  Ext: No pain or swelling     Labs:                        12.0   11.49 )-----------( 289      ( 26 Feb 2019 06:15 )             37.6   baso x      eos x      imm gran x      lymph x      mono x      poly x                            13.3   14.37 )-----------( 326      ( 25 Feb 2019 18:42 )             41.7   baso 0.2    eos 1.8    imm gran 0.4    lymph 26.7   mono 6.1    poly 64.8       MEDICATIONS  (STANDING):  prenatal multivitamin 1 Tablet(s) Oral daily    MEDICATIONS  (PRN):  acetaminophen   Tablet .. 650 milliGRAM(s) Oral every 6 hours PRN Moderate Pain (4 - 6)    RADIOLOGY IMAGING:     EXAM:  US OB TRANSVAGINAL      PROCEDURE DATE:  Feb 25 2019       INTERPRETATION:  CLINICAL INFORMATION: Vaginal bleeding, nausea    TECHNIQUE: Endovaginal pelvic sonogram.     COMPARISON: Pelvic sonogram 2/21/2019    LMP: 2/1/2019  Estimated Gestational Age by LMP: 3 weeks 3 days    FINDINGS:    Uterus:  A single live gestation is identified implanted within the cervix. The   appearance is unchanged from 02/21/2019 A small amount of complex   fluid/blood is identified within the endometrial cavity.    Crown Rump Length: 0.75 cm  Estimated Gestational Age: 6 weeks 5 days  Fetal Heart Rate: 139 bmp    Right ovary: 2.9 x 1.6 x 2.4 cm. Within normal limits. Flow/waveforms   present.  Left ovary: 2.8 x 1.8 x 2.7 cm. 1.6 x 1.0 x 1.6 cm corpus luteum.   Flow/waveforms present.  Free fluid: None.    IMPRESSION:    Live cervical ectopic pregnancy of 6.5 weeks.

## 2019-02-26 NOTE — PROGRESS NOTE ADULT - PROBLEM SELECTOR PLAN 1
Neuro: PO pain meds prn  CV: Hemodynamically stable  Pulm: Saturating well on room air, encourage oob/amb  GI: Continue regular diet  : Voiding spontaneously; pad counts  Heme: SCDs for DVT ppx  ID: Afebrile  Endo: No active issues   Dispo: Repeat sono today, by ATU, is pending; results will dictate if patient requires immediate intervention     Sary Evans PGY-1 -Repeat sono today, by ATU. Results will dictate if patient requires immediate intervention   -Continue pad counts   -Monitor for signs of hemodynamic instability   -Monitor for new onset pain  -Continue regular diet   -Afebrile, no concerns for infection    Sary Evans PGY-1

## 2019-02-27 LAB
BACTERIA UR CULT: SIGNIFICANT CHANGE UP
SPECIMEN SOURCE: SIGNIFICANT CHANGE UP

## 2019-02-27 NOTE — DISCUSSION/SUMMARY
[ED] : a call from ED [FreeTextEntry1] : Patient sent to ED on 2/21 from medicine clinic for vag bleeding/LLQpain w/ +pregnancy test, admitted following U/S concerning for ectopic pregnancy (further eval showing viable intrauterine pregnancy), d/'cd on 2/26. Pt has f/u on 3/4 for antepartum visit. Today, pt states she feels well, is aware of her future appts. Will task FD to schedule f/u for comprehensive eval. \par \par Malachi Skinner, PGY1\par OLIVIER

## 2019-03-01 ENCOUNTER — ASOB RESULT (OUTPATIENT)
Age: 34
End: 2019-03-01

## 2019-03-01 ENCOUNTER — INPATIENT (INPATIENT)
Facility: HOSPITAL | Age: 34
LOS: 0 days | Discharge: HOME | End: 2019-03-02
Attending: OBSTETRICS & GYNECOLOGY | Admitting: OBSTETRICS & GYNECOLOGY
Payer: MEDICAID

## 2019-03-01 ENCOUNTER — OUTPATIENT (OUTPATIENT)
Dept: OUTPATIENT SERVICES | Facility: HOSPITAL | Age: 34
LOS: 1 days | End: 2019-03-01
Payer: MEDICAID

## 2019-03-01 ENCOUNTER — OUTPATIENT (OUTPATIENT)
Dept: OUTPATIENT SERVICES | Facility: HOSPITAL | Age: 34
LOS: 1 days | End: 2019-03-01

## 2019-03-01 ENCOUNTER — APPOINTMENT (OUTPATIENT)
Dept: ANTEPARTUM | Facility: CLINIC | Age: 34
End: 2019-03-01
Payer: MEDICAID

## 2019-03-01 VITALS
HEART RATE: 95 BPM | TEMPERATURE: 99 F | SYSTOLIC BLOOD PRESSURE: 108 MMHG | DIASTOLIC BLOOD PRESSURE: 56 MMHG | RESPIRATION RATE: 16 BRPM | OXYGEN SATURATION: 100 %

## 2019-03-01 DIAGNOSIS — Z98.890 OTHER SPECIFIED POSTPROCEDURAL STATES: Chronic | ICD-10-CM

## 2019-03-01 DIAGNOSIS — O00.80 OTHER ECTOPIC PREGNANCY WITHOUT INTRAUTERINE PREGNANCY: ICD-10-CM

## 2019-03-01 DIAGNOSIS — O00.90 UNSPECIFIED ECTOPIC PREGNANCY WITHOUT INTRAUTERINE PREGNANCY: ICD-10-CM

## 2019-03-01 LAB
ALBUMIN SERPL ELPH-MCNC: 3.6 G/DL — SIGNIFICANT CHANGE UP (ref 3.3–5)
ALP SERPL-CCNC: 38 U/L — LOW (ref 40–120)
ALT FLD-CCNC: 23 U/L — SIGNIFICANT CHANGE UP (ref 4–33)
ANION GAP SERPL CALC-SCNC: 13 MMO/L — SIGNIFICANT CHANGE UP (ref 7–14)
APTT BLD: 29.7 SEC — SIGNIFICANT CHANGE UP (ref 27.5–36.3)
AST SERPL-CCNC: 19 U/L — SIGNIFICANT CHANGE UP (ref 4–32)
BASOPHILS # BLD AUTO: 0.03 K/UL — SIGNIFICANT CHANGE UP (ref 0–0.2)
BASOPHILS NFR BLD AUTO: 0.2 % — SIGNIFICANT CHANGE UP (ref 0–2)
BILIRUB SERPL-MCNC: 0.3 MG/DL — SIGNIFICANT CHANGE UP (ref 0.2–1.2)
BLD GP AB SCN SERPL QL: NEGATIVE — SIGNIFICANT CHANGE UP
BUN SERPL-MCNC: 10 MG/DL — SIGNIFICANT CHANGE UP (ref 7–23)
CALCIUM SERPL-MCNC: 8.9 MG/DL — SIGNIFICANT CHANGE UP (ref 8.4–10.5)
CHLORIDE SERPL-SCNC: 103 MMOL/L — SIGNIFICANT CHANGE UP (ref 98–107)
CO2 SERPL-SCNC: 22 MMOL/L — SIGNIFICANT CHANGE UP (ref 22–31)
CREAT SERPL-MCNC: 0.92 MG/DL — SIGNIFICANT CHANGE UP (ref 0.5–1.3)
EOSINOPHIL # BLD AUTO: 0.23 K/UL — SIGNIFICANT CHANGE UP (ref 0–0.5)
EOSINOPHIL NFR BLD AUTO: 1.6 % — SIGNIFICANT CHANGE UP (ref 0–6)
GLUCOSE SERPL-MCNC: 115 MG/DL — HIGH (ref 70–99)
HCG SERPL-ACNC: SIGNIFICANT CHANGE UP MIU/ML
HCT VFR BLD CALC: 40.4 % — SIGNIFICANT CHANGE UP (ref 34.5–45)
HGB BLD-MCNC: 12.8 G/DL — SIGNIFICANT CHANGE UP (ref 11.5–15.5)
IMM GRANULOCYTES NFR BLD AUTO: 0.5 % — SIGNIFICANT CHANGE UP (ref 0–1.5)
INR BLD: 0.99 — SIGNIFICANT CHANGE UP (ref 0.88–1.17)
LYMPHOCYTES # BLD AUTO: 19.5 % — SIGNIFICANT CHANGE UP (ref 13–44)
LYMPHOCYTES # BLD AUTO: 2.85 K/UL — SIGNIFICANT CHANGE UP (ref 1–3.3)
MCHC RBC-ENTMCNC: 29.3 PG — SIGNIFICANT CHANGE UP (ref 27–34)
MCHC RBC-ENTMCNC: 31.7 % — LOW (ref 32–36)
MCV RBC AUTO: 92.4 FL — SIGNIFICANT CHANGE UP (ref 80–100)
MONOCYTES # BLD AUTO: 0.68 K/UL — SIGNIFICANT CHANGE UP (ref 0–0.9)
MONOCYTES NFR BLD AUTO: 4.6 % — SIGNIFICANT CHANGE UP (ref 2–14)
NEUTROPHILS # BLD AUTO: 10.79 K/UL — HIGH (ref 1.8–7.4)
NEUTROPHILS NFR BLD AUTO: 73.6 % — SIGNIFICANT CHANGE UP (ref 43–77)
NRBC # FLD: 0 K/UL — LOW (ref 25–125)
PLATELET # BLD AUTO: 311 K/UL — SIGNIFICANT CHANGE UP (ref 150–400)
PMV BLD: 10.1 FL — SIGNIFICANT CHANGE UP (ref 7–13)
POTASSIUM SERPL-MCNC: 3.5 MMOL/L — SIGNIFICANT CHANGE UP (ref 3.5–5.3)
POTASSIUM SERPL-SCNC: 3.5 MMOL/L — SIGNIFICANT CHANGE UP (ref 3.5–5.3)
PROT SERPL-MCNC: 7.1 G/DL — SIGNIFICANT CHANGE UP (ref 6–8.3)
PROTHROM AB SERPL-ACNC: 11.3 SEC — SIGNIFICANT CHANGE UP (ref 9.8–13.1)
RBC # BLD: 4.37 M/UL — SIGNIFICANT CHANGE UP (ref 3.8–5.2)
RBC # FLD: 13.3 % — SIGNIFICANT CHANGE UP (ref 10.3–14.5)
RH IG SCN BLD-IMP: POSITIVE — SIGNIFICANT CHANGE UP
SODIUM SERPL-SCNC: 138 MMOL/L — SIGNIFICANT CHANGE UP (ref 135–145)
WBC # BLD: 14.65 K/UL — HIGH (ref 3.8–10.5)
WBC # FLD AUTO: 14.65 K/UL — HIGH (ref 3.8–10.5)

## 2019-03-01 PROCEDURE — 99222 1ST HOSP IP/OBS MODERATE 55: CPT | Mod: GC

## 2019-03-01 PROCEDURE — 76801 OB US < 14 WKS SINGLE FETUS: CPT | Mod: 26

## 2019-03-01 PROCEDURE — 76817 TRANSVAGINAL US OBSTETRIC: CPT | Mod: 26

## 2019-03-01 PROCEDURE — 99231 SBSQ HOSP IP/OBS SF/LOW 25: CPT | Mod: 25

## 2019-03-01 PROCEDURE — 76830 TRANSVAGINAL US NON-OB: CPT | Mod: 26

## 2019-03-01 RX ORDER — ACETAMINOPHEN 500 MG
1000 TABLET ORAL ONCE
Qty: 0 | Refills: 0 | Status: COMPLETED | OUTPATIENT
Start: 2019-03-01 | End: 2019-03-02

## 2019-03-01 RX ORDER — METHOTREXATE 2.5 MG/1
52.5 TABLET ORAL ONCE
Qty: 0 | Refills: 0 | Status: DISCONTINUED | OUTPATIENT
Start: 2019-03-01 | End: 2019-03-02

## 2019-03-01 RX ORDER — SODIUM CHLORIDE 9 MG/ML
1000 INJECTION, SOLUTION INTRAVENOUS
Qty: 0 | Refills: 0 | Status: DISCONTINUED | OUTPATIENT
Start: 2019-03-01 | End: 2019-03-01

## 2019-03-01 RX ADMIN — SODIUM CHLORIDE 125 MILLILITER(S): 9 INJECTION, SOLUTION INTRAVENOUS at 15:24

## 2019-03-01 NOTE — H&P ADULT - ATTENDING COMMENTS
Pt seen and evaluated by me.  KNown pt to our service with suspected cervical ectopic preg vs CARLOS preg, now with findings more c/w cervical ectopic.  Bleeding which seemed to be provoked by intercourse has now resolved.  Pt evaluated by M--plan for Intra-sac and IM MTX.

## 2019-03-01 NOTE — H&P ADULT - NSHPLABSRESULTS_GEN_ALL_CORE
LABS:                        12.8   14.65 )-----------( 311      ( 01 Mar 2019 10:00 )             40.4     03-    138  |  103  |  10  ----------------------------<  115<H>  3.5   |  22  |  0.92    Ca    8.9      01 Mar 2019 10:00    TPro  7.1  /  Alb  3.6  /  TBili  0.3  /  DBili  x   /  AST  19  /  ALT  23  /  AlkPhos  38<L>  03-01      PT/INR - ( 01 Mar 2019 10:00 )   PT: 11.3 SEC;   INR: 0.99          PTT - ( 01 Mar 2019 10:00 )  PTT:29.7 SEC      HC,659 () -> 107,723       ABO: O+

## 2019-03-01 NOTE — ED ADULT NURSE NOTE - CHIEF COMPLAINT QUOTE
7 weeks pregnant with c/o dizziness, vaginal bleeding with clots since this A.M., denies any abdominal discomfort.  No confirmed IUP, is scheduled for repeat sono on Monday.

## 2019-03-01 NOTE — ED PROVIDER NOTE - OBJECTIVE STATEMENT
35 YO F  at a approximately 7 wks gestation, p/w heavy vaginal bleeding 8 AM this morning. States has been having mild abdominal cramping last night. Passing large amounts of blood and clots at home and thought she also saw some yellowish/whitish object pass. Of note recently admitted for monitoring of a questionable cervical ectopic pregnancy. Pt states she was initially feeling light-headed at home which resol. ROS nehg  well tavia Heart rat mild tach, CTABL, abd soft non tender   RN kirsten chap, os open passing large clot (? placenta)   Likely miscarraige but g hx of ectop preg will ont HCG, tupa na dscreen vag US , and will consult OBGYN. 35 YO F  at a approximately 7 wks gestation, p/w heavy vaginal bleeding 8 AM this morning. States has been having mild abdominal cramping last night. Passing large amounts of blood and clots at home and thought she also saw some yellowish/whitish object pass. Of note recently admitted for monitoring of a questionable cervical ectopic pregnancy. Pt states she was initially feeling light-headed at home which resolved.

## 2019-03-01 NOTE — ED ADULT TRIAGE NOTE - CHIEF COMPLAINT QUOTE
7 weeks pregnant with c/o dizziness, vaginal bleeding with clots since this A.M., denies any abdominal discomfort.  No confirmed IUP, is scheduled for repeat sono on Monday. 7 weeks pregnant with c/o dizziness, vaginal bleeding with clots since this A.M., denies any abdominal discomfort.  No confirmed IUP, is scheduled for repeat sono on Monday.

## 2019-03-01 NOTE — ED PROVIDER NOTE - CLINICAL SUMMARY MEDICAL DECISION MAKING FREE TEXT BOX
Likely miscarriage but g hx of ectopic pregnancy. Will obtain HCG, type and screen, transvaginal US , and will consult OB/GYN. Possible miscarriage, given hx of questionable cervical ectopic pregnancy, will consult GYN and repeat TVUS.   - HCG, type and screen, CBC, TVUS, OB consult

## 2019-03-01 NOTE — H&P ADULT - HISTORY OF PRESENT ILLNESS
LMP: 19    Patient is a 35 y/o  presenting with vaginal bleeding. Patient recently admitted to GYN service with VB and cramping and  sonogram concerning for cervical ectopic pregnancy, repeat sonogram suggested lower uterine segment IUP and patient subsequently discharged  from GYN service with miscarriage precautions and close follow up. Today patient presenting with sudden onset heavy vaginal bleeding at 8am. Patient reports passing clot and what she believed to products of conception. She denies cramping, pelvic pain. Denies chest pain and SOB. Denies subjective fevers and chills. She was not trying to concieve but is accepting of the pregnancy.     Repeat sonogram by radiology upon presentation concerning for cervical ectopic. In ED patient seen at bedside by MFM attending and repeat sonogram obtained confirmed this diagnosis.      OBHx:  FT  7#5oz,  FT  7#1oz,  FT  6#1oz, TOP x1 (, )  GYNHx: denies  PMHx: Anxiety, R shoulder surgery (19)  NKDA

## 2019-03-01 NOTE — H&P ADULT - NSHPPHYSICALEXAM_GEN_ALL_CORE
Vital Signs Last 24 Hrs  T(C): 37 (01 Mar 2019 09:22), Max: 37 (01 Mar 2019 09:22)  T(F): 98.6 (01 Mar 2019 09:22), Max: 98.6 (01 Mar 2019 09:22)  HR: 87 (01 Mar 2019 10:08) (87 - 95)  BP: 128/64 (01 Mar 2019 10:08) (108/56 - 128/64)  RR: 16 (01 Mar 2019 10:08) (16 - 16)  SpO2: 100% (01 Mar 2019 10:08) (100% - 100%)    Gen: NAD  Abd: soft, non tender, non distended  SSE: removed 30cc clot, no products of conception noted  VE: internal os closed

## 2019-03-01 NOTE — PATIENT PROFILE ADULT - NSPROMEDSPUMP_GEN_A_NUR
EXAM DESCRIPTION: 

3D Screening BILATERAL : Digital Mammography.



CLINICAL HISTORY: 

83 years Female SCREENING . No complaints. No personal or family

history of breast cancer. Childbirth. Postmenopausal. Currently

on HRT.  Lifetime risk of developing breast cancer (Tyrer-Cuzick

model)(%):  3.0.



COMPARISON: 

Bilateral digital screening 2-D mammography 10/25/2017.  



TECHNIQUE: 

Bilateral CC and MLO projection full-field images, Digital

tomosynthesis mammographic technique.  Bilateral digital 2-D

full-field MLO images. CAD not utilized.  



FINDINGS: 

The breast parenchymal density pattern is: Heterogeneously dense

breast tissue, which may obscure small masses. No skin thickening

or nipple retraction.  Bilateral solitary microcalcifications.

More in the left breast.

No new focal, stellate mass or density, focal asymmetry , and no

suspicious microcalcifications bilaterally. Stable mammograms

compared to prior study.  Taking into account, differences in

mammographic technique.



IMPRESSION: 

Benign exam.



BIRAD CATEGORY: 2 BENIGN FINDINGS.



RECOMMENDATIONS:

FOLLOW UP: Routine digital bilateral  screening, one year

interval from  October 2018.



Written communication explaining the IMPRESSION and follow-up,

will be mailed to the patient and referring health care provider.



According to the [employer] College of [employer], yearly mammograms

are recommended starting at age 40 and continuing as long as a

woman is in good health.  Any breast change noted on a breast

self-exam should be reported promptly to the patient's healthcare

provider.  Breast MRI is recommended for women with an

approximately 20-25% or greater lifetime risk of breast cancer,

including women with a strong family history of breast or ovarian

cancer and women who have been treated for Hodgkin's disease.



A negative mammographic report should not delay tissue diagnosis

in patients with significant clinical history or physical

findings.



Extremely dense breast tissue limits the sensitivity of digital

mammography. 



Electronically signed by:  Geoffrey Chavarria MD  10/31/2018 4:10 PM

CDT Workstation: [id number]
none

## 2019-03-01 NOTE — ED ADULT NURSE REASSESSMENT NOTE - NS ED NURSE REASSESS COMMENT FT1
Pt awake/alert, OBGYN at bedside. Pt states bleeding is now less than when first arrived. Pt to be admitted to OBGYN. Denies discomforts at this time. In NAD - Break RN

## 2019-03-01 NOTE — H&P ADULT - ASSESSMENT
A/P: 35 y/o P3 with cervical ectopic pregnancy  -admit to GYN service for management  -risks of cervical ectopic pregnancy discussed with patient at bedside   -plan for likely IM and intrathecal methotrexate +/- intracervical berry balloon   -NPO/IVF  -pad counts      N Sample, PGY4  pt seen with Dr. Tafoya and Dr. Almanza

## 2019-03-02 ENCOUNTER — TRANSCRIPTION ENCOUNTER (OUTPATIENT)
Age: 34
End: 2019-03-02

## 2019-03-02 VITALS
SYSTOLIC BLOOD PRESSURE: 122 MMHG | OXYGEN SATURATION: 98 % | HEART RATE: 88 BPM | RESPIRATION RATE: 18 BRPM | DIASTOLIC BLOOD PRESSURE: 67 MMHG

## 2019-03-02 LAB
ALBUMIN SERPL ELPH-MCNC: 3.2 G/DL — LOW (ref 3.3–5)
ALP SERPL-CCNC: 35 U/L — LOW (ref 40–120)
ALT FLD-CCNC: 22 U/L — SIGNIFICANT CHANGE UP (ref 4–33)
ANION GAP SERPL CALC-SCNC: 12 MMO/L — SIGNIFICANT CHANGE UP (ref 7–14)
AST SERPL-CCNC: 16 U/L — SIGNIFICANT CHANGE UP (ref 4–32)
BILIRUB SERPL-MCNC: < 0.2 MG/DL — LOW (ref 0.2–1.2)
BUN SERPL-MCNC: 9 MG/DL — SIGNIFICANT CHANGE UP (ref 7–23)
CALCIUM SERPL-MCNC: 8.8 MG/DL — SIGNIFICANT CHANGE UP (ref 8.4–10.5)
CHLORIDE SERPL-SCNC: 101 MMOL/L — SIGNIFICANT CHANGE UP (ref 98–107)
CO2 SERPL-SCNC: 23 MMOL/L — SIGNIFICANT CHANGE UP (ref 22–31)
CREAT SERPL-MCNC: 0.81 MG/DL — SIGNIFICANT CHANGE UP (ref 0.5–1.3)
GLUCOSE SERPL-MCNC: 98 MG/DL — SIGNIFICANT CHANGE UP (ref 70–99)
HCT VFR BLD CALC: 33.9 % — LOW (ref 34.5–45)
HGB BLD-MCNC: 11 G/DL — LOW (ref 11.5–15.5)
MCHC RBC-ENTMCNC: 29.5 PG — SIGNIFICANT CHANGE UP (ref 27–34)
MCHC RBC-ENTMCNC: 32.4 % — SIGNIFICANT CHANGE UP (ref 32–36)
MCV RBC AUTO: 90.9 FL — SIGNIFICANT CHANGE UP (ref 80–100)
NRBC # FLD: 0 K/UL — LOW (ref 25–125)
PLATELET # BLD AUTO: 295 K/UL — SIGNIFICANT CHANGE UP (ref 150–400)
PMV BLD: 10.2 FL — SIGNIFICANT CHANGE UP (ref 7–13)
POTASSIUM SERPL-MCNC: 3.6 MMOL/L — SIGNIFICANT CHANGE UP (ref 3.5–5.3)
POTASSIUM SERPL-SCNC: 3.6 MMOL/L — SIGNIFICANT CHANGE UP (ref 3.5–5.3)
PROT SERPL-MCNC: 6.4 G/DL — SIGNIFICANT CHANGE UP (ref 6–8.3)
RBC # BLD: 3.73 M/UL — LOW (ref 3.8–5.2)
RBC # FLD: 13.6 % — SIGNIFICANT CHANGE UP (ref 10.3–14.5)
SODIUM SERPL-SCNC: 136 MMOL/L — SIGNIFICANT CHANGE UP (ref 135–145)
WBC # BLD: 14.42 K/UL — HIGH (ref 3.8–10.5)
WBC # FLD AUTO: 14.42 K/UL — HIGH (ref 3.8–10.5)

## 2019-03-02 RX ORDER — ACETAMINOPHEN 500 MG
1000 TABLET ORAL ONCE
Qty: 0 | Refills: 0 | Status: DISCONTINUED | OUTPATIENT
Start: 2019-03-02 | End: 2019-03-02

## 2019-03-02 RX ADMIN — Medication 400 MILLIGRAM(S): at 05:10

## 2019-03-02 RX ADMIN — Medication 1000 MILLIGRAM(S): at 06:00

## 2019-03-02 NOTE — DISCHARGE NOTE ADULT - PLAN OF CARE
Recovery to baseline function and activity Take Motrin and Tylenol as needed for pain and cramping.  Vaginal spotting for the next couple of days is normal.  If heavy vaginal bleeding, foul smelling discharge, fevers, or pain not controlled with oral pain meds, please contact your provider or go to the emergency room.  Nothing in the vagina for the next two weeks. Please call [facility] at [phone] for follow-up Take Motrin and Tylenol as needed for pain and cramping.  Vaginal spotting for the next couple of days is normal.  If heavy vaginal bleeding, foul smelling discharge, fevers, or pain not controlled with oral pain meds, please contact your provider or go to the emergency room.  Nothing in the vagina for the next two weeks. Please call ATU at [phone] for follow-up in 1 week. Pt understands that she may require additional treatment (additional dosees of MTX, UAE, cervical balloon or hysterectomy). Also aware of importance of pelvic rest until resolution

## 2019-03-02 NOTE — DISCHARGE NOTE ADULT - CARE PROVIDER_API CALL
[facility],   Phone: [phone]  Fax: (   )    -  Follow Up Time: [facility],   Phone: [phone]  Fax: (   )    -  Follow Up Time: 1 week

## 2019-03-02 NOTE — PROGRESS NOTE ADULT - ATTENDING COMMENTS
I reviewed the vital signs and status of this patient who was co-managed with M team.  Agree with assessment and plan

## 2019-03-02 NOTE — DISCHARGE NOTE ADULT - PATIENT PORTAL LINK FT
You can access the [facility], offered by [facility], by registering with the following website: [email or web address]

## 2019-03-02 NOTE — DISCHARGE NOTE ADULT - HOSPITAL COURSE
Patient was admitted on 3/1/19 with a cervical ectopic pregnancy.  Patient received methotrexate IM and intrauterine in the gestational sac, was admitted overnight for observation.  Patient tolerated treatment well, vaginal bleeding significantly decreased, with stable vital signs and labs.  Patient was discharged in stable condition ambulating, tolerating PO, voiding spontaneously, in stable condition.  Patient will have close outpatient follow-up in Patient was admitted on 3/1/19 with a cervical ectopic pregnancy.  Patient received methotrexate IM and intrauterine in the gestational sac, was admitted overnight for observation.  Patient tolerated treatment well, vaginal bleeding significantly decreased, with stable vital signs and labs.  Patient was discharged in stable condition ambulating, tolerating PO, voiding spontaneously, in stable condition.  Patient will have close outpatient follow-up in 1 week in ATU for repeat labs, hCG, CBC, CMP.  Patient will continue iron supplementation at home.

## 2019-03-02 NOTE — DISCHARGE NOTE ADULT - PROVIDER TOKENS
FREE:[LAST:[[facility]],PHONE:[phone]:[(   )    -]] FREE:[LAST:[[facility]PHONE:[phone]],FAX:[(   )    -],FOLLOWUP:[1 week]]

## 2019-03-02 NOTE — DISCHARGE NOTE ADULT - CARE PLAN
Principal Discharge DX:	Other ectopic pregnancy  Goal:	Recovery to baseline function and activity  Assessment and plan of treatment:	Take Motrin and Tylenol as needed for pain and cramping.  Vaginal spotting for the next couple of days is normal.  If heavy vaginal bleeding, foul smelling discharge, fevers, or pain not controlled with oral pain meds, please contact your provider or go to the emergency room.  Nothing in the vagina for the next two weeks. Please call [facility] at [phone] for follow-up Principal Discharge DX:	Other ectopic pregnancy  Goal:	Recovery to baseline function and activity  Assessment and plan of treatment:	Take Motrin and Tylenol as needed for pain and cramping.  Vaginal spotting for the next couple of days is normal.  If heavy vaginal bleeding, foul smelling discharge, fevers, or pain not controlled with oral pain meds, please contact your provider or go to the emergency room.  Nothing in the vagina for the next two weeks. Please call ATU at [phone] for follow-up in 1 week. Principal Discharge DX:	Other ectopic pregnancy  Goal:	Recovery to baseline function and activity  Assessment and plan of treatment:	Take Motrin and Tylenol as needed for pain and cramping.  Vaginal spotting for the next couple of days is normal.  If heavy vaginal bleeding, foul smelling discharge, fevers, or pain not controlled with oral pain meds, please contact your provider or go to the emergency room.  Nothing in the vagina for the next two weeks. Please call ATU at [phone] for follow-up in 1 week.  Assessment and plan of treatment:	Pt understands that she may require additional treatment (additional dosees of MTX, UAE, cervical balloon or hysterectomy). Also aware of importance of pelvic rest until resolution

## 2019-03-02 NOTE — DISCHARGE NOTE ADULT - MEDICATION SUMMARY - MEDICATIONS TO TAKE
I will START or STAY ON the medications listed below when I get home from the hospital:    ferrous sulfate 325 mg (65 mg elemental iron) oral tablet  -- 1 tab(s) by mouth once a day, As Needed  -- Indication: For for anemia

## 2019-03-02 NOTE — PROGRESS NOTE ADULT - SUBJECTIVE AND OBJECTIVE BOX
34y  p/w cervical ectopic pregnancy s/p intragestational sac and IM methothrexate (3/1), HD#2.    INTERVAL HPI/OVERNIGHT EVENTS: Pt seen and examined at bedside.  Pt w no acute complaints, states has some nausea, denies any episodes of vomiting, is tolerating PO, voiding spontanouesly without issue, denies diahhrea, states has crampy pain that comes and goes, resolves w pain meds.  Denies fevers, chills, SOB, CP, lightheadedness, dizziness.  Is ambulating without issue.    States vaginal bleeding is minimal, per RN 2 pads moderately soaked overnight.    MEDICATIONS  (STANDING):  acetaminophen  IVPB .. 1000 milliGRAM(s) IV Intermittent once  methotrexate Injectable (Non - oncologic) 52.5 milliGRAM(s) IntraMuscular once  methotrexate Injectable (Non - oncologic) 52.5 milliGRAM(s) IntraMuscular once    MEDICATIONS  (PRN):      Allergies    No Known Drug Allergies  peanuts (Anaphylaxis)  Sesame (Unknown)      12 point ROS negative except as outlined above    Vital Signs Last 24 Hrs  T(C): 37.1 (02 Mar 2019 05:11), Max: 37.1 (01 Mar 2019 20:34)  T(F): 98.7 (02 Mar 2019 05:11), Max: 98.8 (01 Mar 2019 20:34)  HR: 74 (02 Mar 2019 05:11) (70 - 88)  BP: 118/47 (02 Mar 2019 05:11) (117/54 - 130/73)  RR: 17 (02 Mar 2019 05:11) (17 - 18)  SpO2: 97% (02 Mar 2019 05:11) (97% - 98%)      PHYSICAL EXAM:    GA: NAD, A+0 x 3  CV: RRR  Pulm: CTA BL  Abd: +BS, soft, nontender, nondistended, no rebound or guarding  : minimal  Extremities: no swelling or calf tenderness          LABS:                        11.0   14.42 )-----------( 295      ( 02 Mar 2019 03:45 )             33.9         136  |  101  |  9   ----------------------------<  98  3.6   |  23  |  0.81    Ca    8.8      02 Mar 2019 03:45    TPro  6.4  /  Alb  3.2<L>  /  TBili  < 0.2<L>  /  DBili  x   /  AST  16  /  ALT  22  /  AlkPhos  35<L>  03-02    PT/INR - ( 01 Mar 2019 10:00 )   PT: 11.3 SEC;   INR: 0.99          PTT - ( 01 Mar 2019 10:00 )  PTT:29.7 SEC      RADIOLOGY & ADDITIONAL TESTS:

## 2019-03-02 NOTE — PROGRESS NOTE ADULT - ASSESSMENT
A/P: 34y  p/w cervical ectopic pregnancy s/p intragestational sac and IM methothrexate (3/1), HD#2, stable  Neuro: c/w po pain meds prn  CV: Hemodynamically stable, am labs w h/h downtrending 12.8/40.4->11.0/33.9 likely due to vaginal bleeding from yesterday, vitals and clinical exam are reassuring, continue to monitor VS  Pulm: Saturating well on room air, encourage oob/amb  GI: c/w regular diet  : voiding spontaneously  Heme: c/w ambulation and SCDs for DVT ppx  Dispo: Continue routine inpatient care, continue to monitor pad counts, clinical status, vital signs    MEL Lucero PGY2

## 2019-03-04 ENCOUNTER — APPOINTMENT (OUTPATIENT)
Dept: ANTEPARTUM | Facility: CLINIC | Age: 34
End: 2019-03-04

## 2019-03-04 DIAGNOSIS — Z71.89 OTHER SPECIFIED COUNSELING: ICD-10-CM

## 2019-03-07 DIAGNOSIS — Z3A.01 LESS THAN 8 WEEKS GESTATION OF PREGNANCY: ICD-10-CM

## 2019-03-07 DIAGNOSIS — O09.521 SUPERVISION OF ELDERLY MULTIGRAVIDA, FIRST TRIMESTER: ICD-10-CM

## 2019-03-07 DIAGNOSIS — O00.80 OTHER ECTOPIC PREGNANCY WITHOUT INTRAUTERINE PREGNANCY: ICD-10-CM

## 2019-03-07 DIAGNOSIS — O99.211 OBESITY COMPLICATING PREGNANCY, FIRST TRIMESTER: ICD-10-CM

## 2019-03-07 DIAGNOSIS — Z3A.00 WEEKS OF GESTATION OF PREGNANCY NOT SPECIFIED: ICD-10-CM

## 2019-03-07 DIAGNOSIS — N93.9 ABNORMAL UTERINE AND VAGINAL BLEEDING, UNSPECIFIED: ICD-10-CM

## 2019-03-08 ENCOUNTER — APPOINTMENT (OUTPATIENT)
Dept: INTERNAL MEDICINE | Facility: HOSPITAL | Age: 34
End: 2019-03-08

## 2019-03-08 ENCOUNTER — APPOINTMENT (OUTPATIENT)
Dept: ANTEPARTUM | Facility: CLINIC | Age: 34
End: 2019-03-08
Payer: MEDICAID

## 2019-03-08 ENCOUNTER — ASOB RESULT (OUTPATIENT)
Age: 34
End: 2019-03-08

## 2019-03-08 PROCEDURE — 76815 OB US LIMITED FETUS(S): CPT | Mod: 26

## 2019-03-08 PROCEDURE — 76817 TRANSVAGINAL US OBSTETRIC: CPT | Mod: 26

## 2019-03-08 PROCEDURE — 99214 OFFICE O/P EST MOD 30 MIN: CPT | Mod: 25

## 2019-03-11 ENCOUNTER — APPOINTMENT (OUTPATIENT)
Dept: OBGYN | Facility: HOSPITAL | Age: 34
End: 2019-03-11

## 2019-03-15 ENCOUNTER — APPOINTMENT (OUTPATIENT)
Dept: OBGYN | Facility: HOSPITAL | Age: 34
End: 2019-03-15

## 2019-04-04 ENCOUNTER — APPOINTMENT (OUTPATIENT)
Dept: ANTEPARTUM | Facility: CLINIC | Age: 34
End: 2019-04-04

## 2019-05-15 ENCOUNTER — EMERGENCY (EMERGENCY)
Facility: HOSPITAL | Age: 34
LOS: 1 days | Discharge: HOME | End: 2019-05-15
Attending: EMERGENCY MEDICINE | Admitting: OBSTETRICS & GYNECOLOGY
Payer: MEDICAID

## 2019-05-15 VITALS
OXYGEN SATURATION: 99 % | RESPIRATION RATE: 16 BRPM | SYSTOLIC BLOOD PRESSURE: 136 MMHG | HEART RATE: 102 BPM | DIASTOLIC BLOOD PRESSURE: 89 MMHG | TEMPERATURE: 98 F

## 2019-05-15 DIAGNOSIS — Z98.890 OTHER SPECIFIED POSTPROCEDURAL STATES: Chronic | ICD-10-CM

## 2019-05-15 DIAGNOSIS — O03.4 INCOMPLETE SPONTANEOUS ABORTION WITHOUT COMPLICATION: ICD-10-CM

## 2019-05-15 LAB
ALBUMIN SERPL ELPH-MCNC: 3.8 G/DL — SIGNIFICANT CHANGE UP (ref 3.3–5)
ALP SERPL-CCNC: 42 U/L — SIGNIFICANT CHANGE UP (ref 40–120)
ALT FLD-CCNC: SIGNIFICANT CHANGE UP U/L (ref 4–33)
ANION GAP SERPL CALC-SCNC: 11 MMO/L — SIGNIFICANT CHANGE UP (ref 7–14)
AST SERPL-CCNC: SIGNIFICANT CHANGE UP U/L (ref 4–32)
BASOPHILS # BLD AUTO: 0.05 K/UL — SIGNIFICANT CHANGE UP (ref 0–0.2)
BASOPHILS NFR BLD AUTO: 0.5 % — SIGNIFICANT CHANGE UP (ref 0–2)
BILIRUB SERPL-MCNC: 0.2 MG/DL — SIGNIFICANT CHANGE UP (ref 0.2–1.2)
BUN SERPL-MCNC: 12 MG/DL — SIGNIFICANT CHANGE UP (ref 7–23)
CALCIUM SERPL-MCNC: 9.4 MG/DL — SIGNIFICANT CHANGE UP (ref 8.4–10.5)
CHLORIDE SERPL-SCNC: 104 MMOL/L — SIGNIFICANT CHANGE UP (ref 98–107)
CO2 SERPL-SCNC: 23 MMOL/L — SIGNIFICANT CHANGE UP (ref 22–31)
CREAT SERPL-MCNC: 0.99 MG/DL — SIGNIFICANT CHANGE UP (ref 0.5–1.3)
EOSINOPHIL # BLD AUTO: 0.2 K/UL — SIGNIFICANT CHANGE UP (ref 0–0.5)
EOSINOPHIL NFR BLD AUTO: 2 % — SIGNIFICANT CHANGE UP (ref 0–6)
GLUCOSE SERPL-MCNC: 86 MG/DL — SIGNIFICANT CHANGE UP (ref 70–99)
HCG SERPL-ACNC: 48.06 MIU/ML — SIGNIFICANT CHANGE UP
HCT VFR BLD CALC: 39 % — SIGNIFICANT CHANGE UP (ref 34.5–45)
HGB BLD-MCNC: 12.1 G/DL — SIGNIFICANT CHANGE UP (ref 11.5–15.5)
IMM GRANULOCYTES NFR BLD AUTO: 0.3 % — SIGNIFICANT CHANGE UP (ref 0–1.5)
LYMPHOCYTES # BLD AUTO: 3.71 K/UL — HIGH (ref 1–3.3)
LYMPHOCYTES # BLD AUTO: 37.2 % — SIGNIFICANT CHANGE UP (ref 13–44)
MCHC RBC-ENTMCNC: 27.9 PG — SIGNIFICANT CHANGE UP (ref 27–34)
MCHC RBC-ENTMCNC: 31 % — LOW (ref 32–36)
MCV RBC AUTO: 89.9 FL — SIGNIFICANT CHANGE UP (ref 80–100)
MONOCYTES # BLD AUTO: 0.54 K/UL — SIGNIFICANT CHANGE UP (ref 0–0.9)
MONOCYTES NFR BLD AUTO: 5.4 % — SIGNIFICANT CHANGE UP (ref 2–14)
NEUTROPHILS # BLD AUTO: 5.44 K/UL — SIGNIFICANT CHANGE UP (ref 1.8–7.4)
NEUTROPHILS NFR BLD AUTO: 54.6 % — SIGNIFICANT CHANGE UP (ref 43–77)
NRBC # FLD: 0 K/UL — SIGNIFICANT CHANGE UP (ref 0–0)
PLATELET # BLD AUTO: 389 K/UL — SIGNIFICANT CHANGE UP (ref 150–400)
PMV BLD: 10.3 FL — SIGNIFICANT CHANGE UP (ref 7–13)
POTASSIUM SERPL-MCNC: SIGNIFICANT CHANGE UP MMOL/L (ref 3.5–5.3)
POTASSIUM SERPL-SCNC: SIGNIFICANT CHANGE UP MMOL/L (ref 3.5–5.3)
PROT SERPL-MCNC: SIGNIFICANT CHANGE UP G/DL (ref 6–8.3)
RBC # BLD: 4.34 M/UL — SIGNIFICANT CHANGE UP (ref 3.8–5.2)
RBC # FLD: 12.6 % — SIGNIFICANT CHANGE UP (ref 10.3–14.5)
SODIUM SERPL-SCNC: 138 MMOL/L — SIGNIFICANT CHANGE UP (ref 135–145)
WBC # BLD: 9.97 K/UL — SIGNIFICANT CHANGE UP (ref 3.8–10.5)
WBC # FLD AUTO: 9.97 K/UL — SIGNIFICANT CHANGE UP (ref 3.8–10.5)

## 2019-05-15 PROCEDURE — 99285 EMERGENCY DEPT VISIT HI MDM: CPT

## 2019-05-15 PROCEDURE — 76830 TRANSVAGINAL US NON-OB: CPT | Mod: 26

## 2019-05-15 RX ORDER — SODIUM CHLORIDE 9 MG/ML
1000 INJECTION, SOLUTION INTRAVENOUS ONCE
Refills: 0 | Status: COMPLETED | OUTPATIENT
Start: 2019-05-15 | End: 2019-05-15

## 2019-05-15 RX ADMIN — SODIUM CHLORIDE 1000 MILLILITER(S): 9 INJECTION, SOLUTION INTRAVENOUS at 19:33

## 2019-05-15 NOTE — CONSULT NOTE ADULT - PROBLEM SELECTOR RECOMMENDATION 9
Plan PENDING DISCUSSION WITH ATTENDING - NOT FINAL  -would recommend cytotec  600 mcg bucally x 1 for retained products (patient instructed to put the medication between front teeth and front lip x 30 mins and then swallow the remainder)  -patient instructed to return to ED if any heavy vaginal bleeding >2 pads/hr for over 2 hours   -patient instructed to follow up with her OBGYN within one to two weeks  -patient instructed to use ibuprofen 600 mg q6 hrs for pain   -all questions/concerns of patient addressed    PA Kim PGY2

## 2019-05-15 NOTE — CONSULT NOTE ADULT - ASSESSMENT
34y  status post MTX intra-sac and IM (3/1) for cervical ectopic pregnancy here w/ continued vaginal bleeding.  TVUS c/w retained products.  Patient clinically and hemodynamically stable.      Patient counseled on options of surgical management with D&C vs. medical management with cytotec.    Patient counseled on risks of D&C including infection; risk of hemorrhage, possibly requiring a blood transfusion; and risk of uterine or cervical injury, possibly requiring laparoscopy, laparotomy or hysterectomy.  These were all reviewed in detail.  The patient was counseled on the small risk of uterine perforation and risk of injury to rectum, bowel, bladder, pelvic nerves, blood vessels and ureters.  The patient was also counseled on the small risk of the need for further surgery and of the risk, as with any surgical procedure, of death.  The patient understands the risks.      Patient counseled on risks of medical managment including risk of infection- which may require further treatment with medication, surgery, antibiotics or hospital admission; risk of heavy bleeding and possible need for subsequent surgical procedure and risk of failure to pass retained products.  Also explained risks of cramping, fever, diarrhea, nausea, vomiting, headache, dizziness, back pain and feeling tired associated with cytotec use.  It was explained in detail that she will experience heavy bleeding a few hours after cytotec administration.  Explained that need to have another individual with her when taking the cytotec and the need to be close to an ER.  Explained need to go to ER if any heavy bleeding >2pads per hour for over 2 hours or feels lightheaded or dizzy.  Explained need to call OBGYN if no passage of tissue or clot within 24-48 hours.  Explained that can take ibuprofen 600 mg q6 hours for abdominal cramping after taking cytotec. 34y  status post MTX intra-sac and IM (3/1) for cervical ectopic pregnancy here w/ continued vaginal bleeding.  TVUS c/w retained products.  Patient clinically and hemodynamically stable. 34y  status post MTX intra-sac and IM (3/1) for cervical ectopic pregnancy here w/ continued vaginal bleeding.  TVUS c/w retained products.  Patient clinically and hemodynamically stable. Recommend admission for possible surgical management given in appropriate decrease in bHCG status post MTX intra-sac and IM.     Full consult note pending.

## 2019-05-15 NOTE — ED ADULT TRIAGE NOTE - CHIEF COMPLAINT QUOTE
pt c/o abdominal pain and vaginal bleeding x 2 months, states she had a d&c in february and "I never followed up with a gyn," endorses fevers and chills @ home, denies dizziness/cp/sob, comfortable in triage

## 2019-05-15 NOTE — ED PROVIDER NOTE - OBJECTIVE STATEMENT
Patient states to have had intermittent left pelvic pain and vaginal bleeding x 2 months; patient had chemical induced  more than 2 months ago for cervical ectopic pregnancy however patient states she never followed up with OB/GYN because she was "busy."

## 2019-05-15 NOTE — ED PROVIDER NOTE - PROGRESS NOTE DETAILS
MD CHO:  I received s/o on this pt.  Concern for retained poc vs new ectopic.  Per obgyn, admit to Dr. Almanza's service.

## 2019-05-15 NOTE — CONSULT NOTE ADULT - SUBJECTIVE AND OBJECTIVE BOX
HPI:  35yo  status post IM and intra-sac MTX on 3/1 for cervical ectopic pregnancy presents to ED with c/o abnormal vaginal bleeding x2mo. Onset of vaginal bleeding  with bleeding similar to menses x2 wks followed by intermittent vaginal bleeding <2ppd. Pt notes bleeding worse after intercourse. Pt has been sexually active without protection/contraception since MTX and last had sex . She denies abdominal pain, cramping, pelvic pain, dizziness/lightheadedness, fever/chills, nausea/vomiting.     OBHx:  FT  7#5oz,  FT  7#1oz,  FT  6#1oz, TOP x1 (, )  GYNHx: denies    PAST MEDICAL & SURGICAL HISTORY:  Anxiety  Depression  H/O shoulder surgery: 18    Allergies  No Known Drug Allergies  peanuts (Anaphylaxis)  Sesame (Unknown)    Intolerances      Prescriptions:    MEDICATIONS: denies     FAMILY HISTORY:  No pertinent family history in first degree relatives      SOCIAL HISTORY:   denies etoh abuse, denies tobacco use, denies drug use.       Vital Signs Last 24 Hrs  T(C): 36.7 (15 May 2019 22:44), Max: 36.7 (15 May 2019 17:45)  T(F): 98.1 (15 May 2019 22:44), Max: 98.1 (15 May 2019 22:44)  HR: 75 (15 May 2019 22:44) (75 - 102)  BP: 128/72 (15 May 2019 22:44) (128/72 - 136/89)  BP(mean): --  RR: 18 (15 May 2019 22:44) (16 - 18)  SpO2: 100% (15 May 2019 22:44) (99% - 100%)    PHYSICAL EXAM:    Constitutional: alert and oriented x 3    Respiratory: clear to ascultation bilaterally     Cardiovascular: regular rate and rhythm, no murmur    Gastrointestinal: soft, non tender, non-distended,  no rebound/guarding, + bowel sounds. No organomegaly, no palpable masses    Genitourinary: Normal external female  exam  Cervix: closed/ long, no CMT  Vaginal: normal vaginal mucosa, scant pink blood in vault, no pooling, no active bleeding, no clots, no foul smelling vaginal discharge   Uterus: Normal size, non tender  Adnexa: Non tender bilaterally, no palpable masses    Rectal: deferred    Extremities: Non-tender bilaterally, No edema    Neurological: Grossly intact            LABS:                        12.1   9.97  )-----------( 389      ( 15 May 2019 19:28 )             39.0     05-15    138  |  104  |  12  ----------------------------<  86  Test not performed SPECIMEN GROSSLY HEMOLYZED   |  23  |  0.99    Ca    9.4      15 May 2019 19:28    TPro  Test not performed SPECIMEN GROSSLY HEMOLYZED  /  Alb  3.8  /  TBili  0.2  /  DBili  x   /  AST  Test not performed SPECIMEN GROSSLY HEMOLYZED  /  ALT  Test not performed SPECIMEN GROSSLY HEMOLYZED  /  AlkPhos  42  05-15      RADIOLOGY & ADDITIONAL STUDIES:  < from: US Transvaginal (05.15.19 @ 21:20) >    EXAM:  US TRANSVAGINAL    PROCEDURE DATE:  May 15 2019     INTERPRETATION:  Clinical indication: Left adnexal pain. D&C 3/1/2019.   Serum beta-hCG 48.1 ([id number] on 3/1/2019)    Technique:  Transvaginal ultrasound of the pelvis was performed.  Grayscale, color Doppler and spectral Doppler were utilized.     Comparison: 3/1/2019.    Findings:     Uterus: Measures 8.1 x 3.1 x 4.0 cm. Unremarkable.  Endometrium: Measures 0.9 cm in thickness.   Cervix: Previously noted gestational sac not seen on this study.   Heterogeneous echotexture, which may be due to complex fluid. Mildly   increased vascularity.  Right ovary: Measures 2.6 x 1.1 x 2.0 cm. Small simple follicles and   small complex follicle/corpus luteum. Flow present.   Left ovary:Measures 2.7 x 1.0 x 2.6 cm. Small follicles. Flow present.   Additional: No adnexal mass. No free fluid.    Impression:    Interval post procedural changes. Heterogeneous echotexture and mildly   increased vascularity of the cervix, which may be dueto complex fluid.   Recommend close follow-up to entirely exclude retained products.      MARCI BUCHANAN M.D., ATTENDING RADIOLOGIST  This document has been electronically signed. May 15 2019 10:29PM        < end of copied text >

## 2019-05-15 NOTE — ED PROVIDER NOTE - CADM POA URETHRAL CATHETER
No

## 2019-05-16 ENCOUNTER — ASOB RESULT (OUTPATIENT)
Age: 34
End: 2019-05-16

## 2019-05-16 ENCOUNTER — TRANSCRIPTION ENCOUNTER (OUTPATIENT)
Age: 34
End: 2019-05-16

## 2019-05-16 ENCOUNTER — APPOINTMENT (OUTPATIENT)
Dept: ANTEPARTUM | Facility: HOSPITAL | Age: 34
End: 2019-05-16
Payer: MEDICAID

## 2019-05-16 VITALS
DIASTOLIC BLOOD PRESSURE: 61 MMHG | OXYGEN SATURATION: 100 % | SYSTOLIC BLOOD PRESSURE: 116 MMHG | WEIGHT: 249.12 LBS | RESPIRATION RATE: 16 BRPM | HEIGHT: 63 IN | TEMPERATURE: 98 F | HEART RATE: 80 BPM

## 2019-05-16 DIAGNOSIS — N93.9 ABNORMAL UTERINE AND VAGINAL BLEEDING, UNSPECIFIED: ICD-10-CM

## 2019-05-16 DIAGNOSIS — O03.4 INCOMPLETE SPONTANEOUS ABORTION WITHOUT COMPLICATION: ICD-10-CM

## 2019-05-16 PROCEDURE — 76857 US EXAM PELVIC LIMITED: CPT | Mod: 26

## 2019-05-16 PROCEDURE — 76830 TRANSVAGINAL US NON-OB: CPT | Mod: 26

## 2019-05-16 RX ORDER — FERROUS SULFATE 325(65) MG
1 TABLET ORAL
Qty: 0 | Refills: 0 | DISCHARGE

## 2019-05-16 RX ORDER — ACETAMINOPHEN 500 MG
975 TABLET ORAL EVERY 6 HOURS
Refills: 0 | Status: DISCONTINUED | OUTPATIENT
Start: 2019-05-16 | End: 2019-05-16

## 2019-05-16 NOTE — H&P ADULT - NSHPPHYSICALEXAM_GEN_ALL_CORE
Constitutional: alert and oriented x 3    Respiratory: clear to ascultation bilaterally     Cardiovascular: regular rate and rhythm, no murmur    Gastrointestinal: soft, non tender, non-distended,  no rebound/guarding, + bowel sounds. No organomegaly, no palpable masses    Genitourinary: Normal external female  exam  Cervix: closed/ long, no CMT  Vaginal: normal vaginal mucosa, scant pink blood in vault, no pooling, no active bleeding, no clots, no foul smelling vaginal discharge   Uterus: Normal size, non tender  Adnexa: Non tender bilaterally, no palpable masses    Rectal: deferred    Extremities: Non-tender bilaterally, No edema    Neurological: Grossly intact

## 2019-05-16 NOTE — DISCHARGE NOTE PROVIDER - HOSPITAL COURSE
Patient is a 33 yo female with recent ObHx of cervical ectopic pregnancy s/p MTX presents for vaginal bleeding x2 months after cervical ectopic treatment. Vaginal bleeding upon admission was minimal. HD#1 patient under went a transvaginal ultrasound which showed heterogenous endometrium and increased vascularity of the cervix. Patient was then re-scanned by ATU and similar findings were noted with no evidence of pregnancy. bHCG trend from previous admission 264250 (3/1)->48 (5/15). Patient discharged and is to have close follow up with [facility].

## 2019-05-16 NOTE — DISCHARGE NOTE PROVIDER - CARE PROVIDER_API CALL
[facility],   [address] [place] [address]   3rd [place] [address]  Phone: [phone]  Fax: (   )    -  Follow Up Time:

## 2019-05-16 NOTE — H&P ADULT - NSHPLABSRESULTS_GEN_ALL_CORE
LABS:                        12.1   9.97  )-----------( 389      ( 15 May 2019 19:28 )             39.0     05-15    138  |  104  |  12  ----------------------------<  86  Test not performed SPECIMEN GROSSLY HEMOLYZED   |  23  |  0.99    Ca    9.4      15 May 2019 19:28    TPro  Test not performed SPECIMEN GROSSLY HEMOLYZED  /  Alb  3.8  /  TBili  0.2  /  DBili  x   /  AST  Test not performed SPECIMEN GROSSLY HEMOLYZED  /  ALT  Test not performed SPECIMEN GROSSLY HEMOLYZED  /  AlkPhos  42  05-15      RADIOLOGY & ADDITIONAL STUDIES:  < from: US Transvaginal (05.15.19 @ 21:20) >    EXAM:  US TRANSVAGINAL    PROCEDURE DATE:  May 15 2019     INTERPRETATION:  Clinical indication: Left adnexal pain. D&C 3/1/2019.   Serum beta-hCG 48.1 ([id number] on 3/1/2019)    Technique:  Transvaginal ultrasound of the pelvis was performed.  Grayscale, color Doppler and spectral Doppler were utilized.     Comparison: 3/1/2019.    Findings:     Uterus: Measures 8.1 x 3.1 x 4.0 cm. Unremarkable.  Endometrium: Measures 0.9 cm in thickness.   Cervix: Previously noted gestational sac not seen on this study.   Heterogeneous echotexture, which may be due to complex fluid. Mildly   increased vascularity.  Right ovary: Measures 2.6 x 1.1 x 2.0 cm. Small simple follicles and   small complex follicle/corpus luteum. Flow present.   Left ovary: Measures 2.7 x 1.0 x 2.6 cm. Small follicles. Flow present.   Additional: No adnexal mass. No free fluid.    Impression:    Interval post procedural changes. Heterogeneous echotexture and mildly   increased vascularity of the cervix, which may be dueto complex fluid.   Recommend close follow-up to entirely exclude retained products.      MARCI BUCHANAN M.D., ATTENDING RADIOLOGIST  This document has been electronically signed. May 15 2019 10:29PM        < end of copied text >

## 2019-05-16 NOTE — H&P ADULT - ASSESSMENT
34y  status post MTX intra-sac and IM (3/1) for cervical ectopic pregnancy admitted for continued vaginal bleeding and persistent bHCG of 48 in ED.  TVUS concerning for possible retained products of concetpion.  Patient clinically and hemodynamically stable. Pt admitted for further evaluation and treatment of persistent ectopic pregnancy vs. retained products of conception vs. gestational trophoblastic disease.

## 2019-05-16 NOTE — H&P ADULT - HISTORY OF PRESENT ILLNESS
35yo  status post IM and intra-sac MTX on 3/1 for cervical ectopic pregnancy presents to ED with c/o abnormal vaginal bleeding x2mo. Onset of vaginal bleeding  with bleeding similar to menses x2 wks followed by intermittent vaginal bleeding <2ppd. Pt notes bleeding worse after intercourse. Notes chills last Friday, no fevers.  Pt has been sexually active without protection/contraception since MTX and last had sex . She currently denies abdominal pain, cramping, pelvic pain, dizziness/lightheadedness, fever/chills, nausea/vomiting.     OBHx:  FT  7#5oz,  FT  7#1oz,  FT  6#1oz, TOP x1 (, )  GYNHx: denies

## 2019-05-16 NOTE — DISCHARGE NOTE NURSING/CASE MANAGEMENT/SOCIAL WORK - NSDCDPATPORTLINK_GEN_ALL_CORE
You can access the [facility] Patient Portal, offered by [facility], by registering with the following website: [email or web address]

## 2019-05-16 NOTE — CHART NOTE - NSCHARTNOTEFT_GEN_A_CORE
Discussed case with antepartum service and M Dr. Michelle. Irregular bleeding is to be expected following a cervical ectopic pregnancy. bHCG is likely from the cervical ectopic given high starting bHCG level at time of termination. Additional imaging is not warranted at this time per MFM. Bleeding should be evaluated by GYN team and if deemed appropriate, patient should be scheduled for outpatient follow up in one week.     d/w Dr. Suze Dwakins pgy2

## 2019-05-16 NOTE — H&P ADULT - PROBLEM SELECTOR PLAN 1
Neuro: PO pain medication prn  CV: Patient hemodynamically stable.    Pulm: saturating well on room air   GI: Regular   : Voiding spontaneously   Heme: SCD's and ambulation  ID: afebrile   FEN: LR@125.  Dispo: MFM to evaluate in [facility] Kim PGY-2   Patient seen with Dr. Jung and d/w Fellow Dr. Blair Neuro: PO pain medication prn  CV: Patient hemodynamically stable.    Pulm: saturating well on room air   GI: Regular   : Voiding spontaneously   Heme: SCD's and ambulation  ID: afebrile   FEN: SLIV  Dispo: MFM to evaluate in [facility] Kim PGY-2   Patient seen with Dr. Jung and d/w Fellow Dr. Blair

## 2019-05-16 NOTE — DISCHARGE NOTE PROVIDER - PROVIDER TOKENS
FREE:[LAST:[[facility]],PHONE:[phone]],FAX:[(   [phone]    -],ADDRESS:[[phone] [place] [address] [place] [address]]]

## 2019-05-21 ENCOUNTER — CHART COPY (OUTPATIENT)
Age: 34
End: 2019-05-21

## 2019-05-22 ENCOUNTER — APPOINTMENT (OUTPATIENT)
Dept: OBGYN | Facility: HOSPITAL | Age: 34
End: 2019-05-22
Payer: MEDICAID

## 2019-05-22 ENCOUNTER — OUTPATIENT (OUTPATIENT)
Dept: OUTPATIENT SERVICES | Facility: HOSPITAL | Age: 34
LOS: 1 days | End: 2019-05-22

## 2019-05-22 ENCOUNTER — LABORATORY RESULT (OUTPATIENT)
Age: 34
End: 2019-05-22

## 2019-05-22 VITALS
DIASTOLIC BLOOD PRESSURE: 71 MMHG | HEART RATE: 80 BPM | SYSTOLIC BLOOD PRESSURE: 131 MMHG | WEIGHT: 257.94 LBS | BODY MASS INDEX: 45.7 KG/M2 | HEIGHT: 63 IN

## 2019-05-22 DIAGNOSIS — O00.80 OTHER. ECTOPIC PREGNANCY WITHOUT INTRAUTERINE PREGNANCY: ICD-10-CM

## 2019-05-22 DIAGNOSIS — Z98.890 OTHER SPECIFIED POSTPROCEDURAL STATES: Chronic | ICD-10-CM

## 2019-05-22 LAB — HCG SERPL-ACNC: 15.13 MIU/ML — SIGNIFICANT CHANGE UP

## 2019-05-22 PROCEDURE — 99213 OFFICE O/P EST LOW 20 MIN: CPT | Mod: GE

## 2019-05-23 NOTE — PHYSICAL EXAM
[Awake] : awake [Alert] : alert [Soft] : soft [Oriented x3] : oriented to person, place, and time [Acute Distress] : no acute distress [Tender] : non tender [Distended] : not distended [H/Smegaly] : no hepatosplenomegaly [Depressed Mood] : not depressed [Flat Affect] : affect not flat

## 2019-05-28 DIAGNOSIS — O00.80 OTHER ECTOPIC PREGNANCY WITHOUT INTRAUTERINE PREGNANCY: ICD-10-CM

## 2019-05-30 ENCOUNTER — APPOINTMENT (OUTPATIENT)
Dept: OBGYN | Facility: HOSPITAL | Age: 34
End: 2019-05-30

## 2019-06-03 ENCOUNTER — APPOINTMENT (OUTPATIENT)
Dept: OBGYN | Facility: HOSPITAL | Age: 34
End: 2019-06-03

## 2019-06-04 ENCOUNTER — INPATIENT (INPATIENT)
Facility: HOSPITAL | Age: 34
LOS: 2 days | Discharge: HOME | End: 2019-06-07
Attending: OBSTETRICS & GYNECOLOGY | Admitting: OBSTETRICS & GYNECOLOGY
Payer: MEDICAID

## 2019-06-04 VITALS
HEART RATE: 101 BPM | DIASTOLIC BLOOD PRESSURE: 66 MMHG | TEMPERATURE: 98 F | RESPIRATION RATE: 18 BRPM | OXYGEN SATURATION: 100 % | SYSTOLIC BLOOD PRESSURE: 116 MMHG

## 2019-06-04 DIAGNOSIS — N93.9 ABNORMAL UTERINE AND VAGINAL BLEEDING, UNSPECIFIED: ICD-10-CM

## 2019-06-04 DIAGNOSIS — Z98.890 OTHER SPECIFIED POSTPROCEDURAL STATES: Chronic | ICD-10-CM

## 2019-06-04 LAB
ALBUMIN SERPL ELPH-MCNC: 3.8 G/DL — SIGNIFICANT CHANGE UP (ref 3.3–5)
ALP SERPL-CCNC: 50 U/L — SIGNIFICANT CHANGE UP (ref 40–120)
ALT FLD-CCNC: 32 U/L — SIGNIFICANT CHANGE UP (ref 4–33)
ANION GAP SERPL CALC-SCNC: 12 MMO/L — SIGNIFICANT CHANGE UP (ref 7–14)
APTT BLD: 31.8 SEC — SIGNIFICANT CHANGE UP (ref 27.5–36.3)
AST SERPL-CCNC: 26 U/L — SIGNIFICANT CHANGE UP (ref 4–32)
BASOPHILS # BLD AUTO: 0.03 K/UL — SIGNIFICANT CHANGE UP (ref 0–0.2)
BASOPHILS # BLD AUTO: 0.05 K/UL — SIGNIFICANT CHANGE UP (ref 0–0.2)
BASOPHILS NFR BLD AUTO: 0.2 % — SIGNIFICANT CHANGE UP (ref 0–2)
BASOPHILS NFR BLD AUTO: 0.3 % — SIGNIFICANT CHANGE UP (ref 0–2)
BILIRUB SERPL-MCNC: 0.3 MG/DL — SIGNIFICANT CHANGE UP (ref 0.2–1.2)
BLD GP AB SCN SERPL QL: NEGATIVE — SIGNIFICANT CHANGE UP
BUN SERPL-MCNC: 11 MG/DL — SIGNIFICANT CHANGE UP (ref 7–23)
CALCIUM SERPL-MCNC: 9.3 MG/DL — SIGNIFICANT CHANGE UP (ref 8.4–10.5)
CHLORIDE SERPL-SCNC: 100 MMOL/L — SIGNIFICANT CHANGE UP (ref 98–107)
CO2 SERPL-SCNC: 26 MMOL/L — SIGNIFICANT CHANGE UP (ref 22–31)
CREAT SERPL-MCNC: 0.93 MG/DL — SIGNIFICANT CHANGE UP (ref 0.5–1.3)
EOSINOPHIL # BLD AUTO: 0.16 K/UL — SIGNIFICANT CHANGE UP (ref 0–0.5)
EOSINOPHIL # BLD AUTO: 0.16 K/UL — SIGNIFICANT CHANGE UP (ref 0–0.5)
EOSINOPHIL NFR BLD AUTO: 1.1 % — SIGNIFICANT CHANGE UP (ref 0–6)
EOSINOPHIL NFR BLD AUTO: 1.2 % — SIGNIFICANT CHANGE UP (ref 0–6)
GLUCOSE SERPL-MCNC: 91 MG/DL — SIGNIFICANT CHANGE UP (ref 70–99)
HCG SERPL-ACNC: < 5 MIU/ML — SIGNIFICANT CHANGE UP
HCT VFR BLD CALC: 31.6 % — LOW (ref 34.5–45)
HCT VFR BLD CALC: 35.5 % — SIGNIFICANT CHANGE UP (ref 34.5–45)
HGB BLD-MCNC: 10.1 G/DL — LOW (ref 11.5–15.5)
HGB BLD-MCNC: 11.1 G/DL — LOW (ref 11.5–15.5)
IMM GRANULOCYTES NFR BLD AUTO: 0.3 % — SIGNIFICANT CHANGE UP (ref 0–1.5)
IMM GRANULOCYTES NFR BLD AUTO: 0.3 % — SIGNIFICANT CHANGE UP (ref 0–1.5)
INR BLD: 1.08 — SIGNIFICANT CHANGE UP (ref 0.88–1.17)
LYMPHOCYTES # BLD AUTO: 26.5 % — SIGNIFICANT CHANGE UP (ref 13–44)
LYMPHOCYTES # BLD AUTO: 27.3 % — SIGNIFICANT CHANGE UP (ref 13–44)
LYMPHOCYTES # BLD AUTO: 3.77 K/UL — HIGH (ref 1–3.3)
LYMPHOCYTES # BLD AUTO: 3.84 K/UL — HIGH (ref 1–3.3)
MCHC RBC-ENTMCNC: 27.2 PG — SIGNIFICANT CHANGE UP (ref 27–34)
MCHC RBC-ENTMCNC: 27.6 PG — SIGNIFICANT CHANGE UP (ref 27–34)
MCHC RBC-ENTMCNC: 31.3 % — LOW (ref 32–36)
MCHC RBC-ENTMCNC: 32 % — SIGNIFICANT CHANGE UP (ref 32–36)
MCV RBC AUTO: 86.3 FL — SIGNIFICANT CHANGE UP (ref 80–100)
MCV RBC AUTO: 87 FL — SIGNIFICANT CHANGE UP (ref 80–100)
MONOCYTES # BLD AUTO: 0.66 K/UL — SIGNIFICANT CHANGE UP (ref 0–0.9)
MONOCYTES # BLD AUTO: 0.76 K/UL — SIGNIFICANT CHANGE UP (ref 0–0.9)
MONOCYTES NFR BLD AUTO: 4.8 % — SIGNIFICANT CHANGE UP (ref 2–14)
MONOCYTES NFR BLD AUTO: 5.2 % — SIGNIFICANT CHANGE UP (ref 2–14)
NEUTROPHILS # BLD AUTO: 9.15 K/UL — HIGH (ref 1.8–7.4)
NEUTROPHILS # BLD AUTO: 9.63 K/UL — HIGH (ref 1.8–7.4)
NEUTROPHILS NFR BLD AUTO: 66.2 % — SIGNIFICANT CHANGE UP (ref 43–77)
NEUTROPHILS NFR BLD AUTO: 66.6 % — SIGNIFICANT CHANGE UP (ref 43–77)
NRBC # FLD: 0 K/UL — SIGNIFICANT CHANGE UP (ref 0–0)
NRBC # FLD: 0.02 K/UL — SIGNIFICANT CHANGE UP (ref 0–0)
PLATELET # BLD AUTO: 347 K/UL — SIGNIFICANT CHANGE UP (ref 150–400)
PLATELET # BLD AUTO: 371 K/UL — SIGNIFICANT CHANGE UP (ref 150–400)
PMV BLD: 10.1 FL — SIGNIFICANT CHANGE UP (ref 7–13)
PMV BLD: 10.4 FL — SIGNIFICANT CHANGE UP (ref 7–13)
POTASSIUM SERPL-MCNC: 3.9 MMOL/L — SIGNIFICANT CHANGE UP (ref 3.5–5.3)
POTASSIUM SERPL-SCNC: 3.9 MMOL/L — SIGNIFICANT CHANGE UP (ref 3.5–5.3)
PROT SERPL-MCNC: 7 G/DL — SIGNIFICANT CHANGE UP (ref 6–8.3)
PROTHROM AB SERPL-ACNC: 12 SEC — SIGNIFICANT CHANGE UP (ref 9.8–13.1)
RBC # BLD: 3.66 M/UL — LOW (ref 3.8–5.2)
RBC # BLD: 4.08 M/UL — SIGNIFICANT CHANGE UP (ref 3.8–5.2)
RBC # FLD: 13.3 % — SIGNIFICANT CHANGE UP (ref 10.3–14.5)
RBC # FLD: 13.4 % — SIGNIFICANT CHANGE UP (ref 10.3–14.5)
RH IG SCN BLD-IMP: POSITIVE — SIGNIFICANT CHANGE UP
SODIUM SERPL-SCNC: 138 MMOL/L — SIGNIFICANT CHANGE UP (ref 135–145)
WBC # BLD: 13.81 K/UL — HIGH (ref 3.8–10.5)
WBC # BLD: 14.49 K/UL — HIGH (ref 3.8–10.5)
WBC # FLD AUTO: 13.81 K/UL — HIGH (ref 3.8–10.5)
WBC # FLD AUTO: 14.49 K/UL — HIGH (ref 3.8–10.5)

## 2019-06-04 PROCEDURE — 99222 1ST HOSP IP/OBS MODERATE 55: CPT

## 2019-06-04 PROCEDURE — 76830 TRANSVAGINAL US NON-OB: CPT | Mod: 26

## 2019-06-04 RX ORDER — SODIUM CHLORIDE 9 MG/ML
2000 INJECTION INTRAMUSCULAR; INTRAVENOUS; SUBCUTANEOUS ONCE
Refills: 0 | Status: COMPLETED | OUTPATIENT
Start: 2019-06-04 | End: 2019-06-04

## 2019-06-04 RX ORDER — FENTANYL CITRATE 50 UG/ML
50 INJECTION INTRAVENOUS ONCE
Refills: 0 | Status: DISCONTINUED | OUTPATIENT
Start: 2019-06-04 | End: 2019-06-05

## 2019-06-04 RX ORDER — SODIUM CHLORIDE 9 MG/ML
1000 INJECTION INTRAMUSCULAR; INTRAVENOUS; SUBCUTANEOUS ONCE
Refills: 0 | Status: COMPLETED | OUTPATIENT
Start: 2019-06-04 | End: 2019-06-04

## 2019-06-04 RX ORDER — TRANEXAMIC ACID 100 MG/ML
1000 INJECTION, SOLUTION INTRAVENOUS ONCE
Refills: 0 | Status: DISCONTINUED | OUTPATIENT
Start: 2019-06-04 | End: 2019-06-05

## 2019-06-04 RX ORDER — ONDANSETRON 8 MG/1
4 TABLET, FILM COATED ORAL ONCE
Refills: 0 | Status: COMPLETED | OUTPATIENT
Start: 2019-06-04 | End: 2019-06-05

## 2019-06-04 RX ADMIN — SODIUM CHLORIDE 1000 MILLILITER(S): 9 INJECTION INTRAMUSCULAR; INTRAVENOUS; SUBCUTANEOUS at 18:59

## 2019-06-04 RX ADMIN — SODIUM CHLORIDE 2000 MILLILITER(S): 9 INJECTION INTRAMUSCULAR; INTRAVENOUS; SUBCUTANEOUS at 23:32

## 2019-06-04 RX ADMIN — FENTANYL CITRATE 50 MICROGRAM(S): 50 INJECTION INTRAVENOUS at 22:50

## 2019-06-04 RX ADMIN — FENTANYL CITRATE 50 MICROGRAM(S): 50 INJECTION INTRAVENOUS at 23:05

## 2019-06-04 NOTE — H&P ADULT - NSHPPHYSICALEXAM_GEN_ALL_CORE
PHYSICAL EXAM:    Constitutional: alert and oriented x 3    Respiratory: clear to ascultation bilaterally     Cardiovascular: regular rate (94bpm) and rhythm, no murmur    Gastrointestinal: soft, non-tender, non-distended,  no rebound/guarding, + bowel sounds. No organomegaly, no palpable masses    Genitourinary: Normal external female  exam  Cervix: open, no CMT  Vaginal: normal vaginal mucosa, +blood and clots in vault, approx 200cc clotted blood at bedside and passed prior by pt   Uterus: Normal size, non tender  Adnexa: Non tender bilaterally, no palpable masses    Rectal: deferred    Extremities: Non-tender bilaterally, No edema    Neurological: Grossly intact

## 2019-06-04 NOTE — ED PROVIDER NOTE - OBJECTIVE STATEMENT
35 y/o female  s/p cervical ectopic pregnancy 2 months ago with a PMHx of depression presents to the ED c/o vaginal bleeding x2 months (never stopped bleeding after ectopic pregnancy which was treated with methotrexate). Pt did not f/u with OB/GYN appropriately and is now here with worsening of bleeding this morning. Pt states she has been changing 4 pads an hour. In exam room, pt is noted to be standing over a angella filled with 200cc of clots secondary to vaginal bleeding. Pt states she felt like she was going to pass out today, prompting a visit to the ED. At time of eval, pt admits to lower abd cramps. Denies SOB or palpitations. No other acute complaints at time of eval.

## 2019-06-04 NOTE — ED PROVIDER NOTE - CLINICAL SUMMARY MEDICAL DECISION MAKING FREE TEXT BOX
35 y/o female with vaginal bleeding x2 months s/p cervical ectopic. Has had intercourse without protecting since. Concern for new pregnancy vs unresolved ectopic from March 2019. Plan for labs, transvaginal US, GYN consult, likely admission.

## 2019-06-04 NOTE — H&P ADULT - PROBLEM SELECTOR PLAN 1
Neuro: Fentanyl x1 given in ED for pain control with placement of cervical berry  CV: Hemodynamically stable status post 3uPRBCs. Hct 35.5->31.6->3uPRBC->35.7. Cervical berry in place to tamponade bleeding with minimal output  Pulm: O2 sat WNL on RA.  GI: NPO for IR procedure vs possible OR  : berry placed for strict UOP monitoring    Heme: DVT ppx: SCDs while in bed, no chemical ppx in setting of active bleeding   ID: Afebrile, No signs of infection. Elevated   Dispo: admit to GYN for IR embolization for vaginal bleeding     Pt seen and d/w Shital Sepulveda, Kelli Suarez PGY2

## 2019-06-04 NOTE — H&P ADULT - ASSESSMENT
34y  status post MTX intra-sac and IM (3/1) for cervical ectopic pregnancy admitted 1d heavy vaginal bleeding in setting of continued vaginal bleeding since . TVUS demonstrated small echogenic focus containing fluid with pulsation with maternal heartbeat of unknown etiology; no flow demonstrated to suggest retained products. In ED pt with continued heavy vaginal bleeding in setting of unstable VS. After placement of cervical berry bleeding temporized and status post 3uPRBCs VS stabilized. Plan for pt to precede to IR for embolization and possible D&C/hysterectomy if bleeding continues.       Neuro: Pain well controlled, transition to oral pain medications  CV: Hemodynamically stable status post 3uPRBCs. Hct 35.5->31.6->3uPRBC->35.7. Cervical berry in place to tamponade bleeding.   Pulm: O2 sat WNL on RA.  GI: NPO for IR procedure vs possible OR  : berry placed for strict UOP monitoring    Heme: DVT ppx: SCDs while in bed, no chemical ppx in setting of active bleeding   ID: Afebrile, No signs of infection. Elevated   Dispo: c/w routine postoperative care    Pt seen and d/w Shital Sepulveda, Kelli Suarez PGY3 34y  status post MTX intra-sac and IM (3/1) for cervical ectopic pregnancy admitted 1d heavy vaginal bleeding in setting of continued vaginal bleeding since . TVUS demonstrated small echogenic focus containing fluid with pulsation with maternal heartbeat of unknown etiology; no flow demonstrated to suggest retained products. In ED pt with continued heavy vaginal bleeding in setting of unstable VS. After placement of cervical berry bleeding temporized and status post 3uPRBCs VS stabilized. Plan for pt to precede to IR for embolization and possible D&C/hysterectomy if bleeding continues.

## 2019-06-04 NOTE — H&P ADULT - NSHPLABSRESULTS_GEN_ALL_CORE
.  LABS:                         11.6   x     )-----------( 263      ( 05 Jun 2019 00:02 )             35.7     06-04    138  |  100  |  11  ----------------------------<  91  3.9   |  26  |  0.93    Ca    9.3      04 Jun 2019 18:55    TPro  7.0  /  Alb  3.8  /  TBili  0.3  /  DBili  x   /  AST  26  /  ALT  32  /  AlkPhos  50  06-04    PT/INR - ( 04 Jun 2019 23:58 )   PT: 12.7 SEC;   INR: 1.14          PTT - ( 04 Jun 2019 23:58 )  PTT:24.6 SEC              RADIOLOGY, EKG & ADDITIONAL TESTS: Reviewed.   < from: US Transvaginal (06.04.19 @ 20:02) >      EXAM:  US TRANSVAGINAL        PROCEDURE DATE:  Jun 4 2019         INTERPRETATION:  CLINICAL INFORMATION: Status post D&C in March. Bleeding   for 3 months.  Beta hCG today is <5       COMPARISON: May 15, 2019 and March 20, 2019    TECHNIQUE:     Transabdominal transvaginal ultrasound was performed.      FINDINGS:    Uterus: 10 x 4.4 x 5.6 cm cm. Small echogenic focus containing fluid with   thick walls and pulsation from maternal heartbeat. This is of unknown   etiology but may be the fluid seen on prior exam which has now organized.   There is no flow to suggest retained products. Repeat beta hCG levels and   close interval follow-up with ultrasound recommended.    Endometrium: 0 point mm. Within normal limits.    Right ovary: 4.3 x 2.5 x3.4 cm. Within normal limits. 2 cysts are   identified in the right ovary measuring approximately 2.3 x 2.3 x 2.6 and   2 x 1.6 x 2.3 cm. These are grossly unchanged since prior exam    Left ovary: 2.2 x 1 x 1 cm cm. Within normal limits.    Fluid: None.    IMPRESSION:    Small echogenic focus containing fluid with thick walls demonstrating   pulsation from maternal heartbeat. This is of unknown etiology but may be   the fluid seen on prior exam which has now organized. There is no flow to   suggest retained products. Repeat beta hCG levels and close interval   follow-up with ultrasound recommended.    The findings were discussed with Dr. Newman at time of final signing.    JOYCELYN ESQUEDA M.D., ATTENDING RADIOLOGIST  This document has been electronically signed. Jun 4 2019  9:00PM

## 2019-06-04 NOTE — ED PROVIDER NOTE - ATTENDING CONTRIBUTION TO CARE
DR. SAN, ATTENDING MD-  I performed a face to face bedside interview with patient regarding history of present illness, review of symptoms and past medical history. I completed an independent physical exam.  I have discussed patient's plan of care with PA.   Documentation as above in the note.    Trent: history as documented above, c/o ongoing and worsening vaginal bleeding.  On my exam, patient very well appearing, HD stable except mild tachycardia, abd soft nt, heart reg but tachy, lungs clear. I did not perform pelvic as patient has been examine dby both my PA and by GYN.  Plan: labs, sono, gyn recs

## 2019-06-04 NOTE — ED ADULT NURSE NOTE - NSIMPLEMENTINTERV_GEN_ALL_ED
Implemented All Universal Safety Interventions:  [place] to call system. Call bell, personal items and telephone within reach. Instruct patient to call for assistance. Room bathroom lighting operational. Non-slip footwear when patient is off stretcher. Physically safe environment: no spills, clutter or unnecessary equipment. Stretcher in lowest position, wheels locked, appropriate side rails in place.

## 2019-06-04 NOTE — ED ADULT NURSE NOTE - OBJECTIVE STATEMENT
Received pt to intake room 4 with BARBRA Paul and OBGYN at bedside. Pt with noted clots passing upon being roomed. Pt remains awake and alert states she has had abdominal cramping  today. Pt states she had an ectopic pregnancy 3 weeks ago and was treated with methotrexate. Pt states she wasn't able to follow up with OBGYN and has been bleeding without being able to make an apt. PIV inserted with aseptic technique, blood drawn, labeled at bedside with 2 pt identifiers and sent to lab. Will continue to monitor. PCA at bedside with OBGYN and this writer, Vaginal exam being done per OBGYN at this time, IVF started per order.

## 2019-06-04 NOTE — ED ADULT NURSE REASSESSMENT NOTE - NS ED NURSE REASSESS COMMENT FT1
Pt became tachycardiac and hypotensive, OBGYN called. Second 20G iv placed in left AC. Emergency blood hung first unit began at 22:30. Pt became tachycardiac and hypotensive, OBGYN called. Second 20G iv placed in left AC. Emergency blood ordered by MD Newman, first unit began at 22:30. Second unit began at 22:50. Report moved to main er room 14. Report given to ALONDRA Carlos.

## 2019-06-04 NOTE — ED PROVIDER NOTE - PROGRESS NOTE DETAILS
Gia ALMARAZ for ED Beverly BELLE: Pelvic exam deferred to GYN team who were called upon immediate eval. BARBRA Nguyen: Pt reassessed after coming back from US, appears well although states she did pass clots while upstairs in US. Hcg <5, US w/ pulsatile structure @ cervix. OB resident called, recommends waiting for the official read. Pt stable. BARBRA Nguyen: Rpt CBC ordered for trending purposes BARBRA Nguyen: Pt now bleeding heavily, HR up to 150 on tele, BP stable. Gyn called, coming down now w/ GYn attg BARBRA Nguyen: 2 U prbc ordered stat, pt gave verbal consent to blood transfusion. BP down trending and HR becoming bradycardic high 50s-low 60. OBGYN evaluating pt, berry w/ 30 cc balloon inserted into cervix by gyn to help stop bleeding. Plan for IR for Uterine artery embolization vs. hysterectomy. Pt moved to room 14 and signed out to anastasia attending Dr. Ventura. Family aware of plan and with pt. Consent for blood transfusion signed.

## 2019-06-04 NOTE — ED ADULT TRIAGE NOTE - CHIEF COMPLAINT QUOTE
c/o heavy bleeding 4 pads since yesterday reports hx of ectopic pregnancy with OB " procedure done' 1 month ago for product removal. co lower abd pain. pt endorses dizziness and palpitations.

## 2019-06-04 NOTE — H&P ADULT - HISTORY OF PRESENT ILLNESS
33yo  status post IM and intra-sac MTX on 3/1 for cervical ectopic pregnancy presents to ED with c/o abnormal vaginal bleeding x4wks. Pt previously admitted  with complaint of intermittent vaginal bleeding that began on  filling <2ppd. During prior admission she was rescanned by ATU and no evidence of pregnancy was seen so she was discharged with plan to follow up in clinic as her bleeding was stable at that time. Patient's bHCG has been down trending from 360079(3/1)->48(5/15)->15().    [facility] bleeding 4ppd for 2-3wks with acute onset of heavy vaginal bleeding today since 9am. Pt has been filling 1-2pads per hour since 9am and passing multiple large clots. Per pt her bleeding comes in episodes associated with cramping, passage of clots, and dizziness. At home she felt like she was going to pass out and so she came to ED. Admits to dizziness/lightheadedness, nausea, chills and cramping pelvic pain. Since being in the ED she passed multiple large clots with noted improvement in abdominal cramping. While at sitting or laying down pt denies dizziness/lightheadedness, feeling faint, CP/SOB, palpitations, nausea upon initial exam at 7pm. Upon initial exam, 200cc clotted blood noted at bedside with slow bleeding from cervix. Pt placed on monitor while at bedside for continuous monitoring with HR 90s and BP stable 110-120/60s.    Pt reexamined status post TVUS due to report 2nd episode of vaginal bleeding. At that time pt with 150cc clotted blood and pooling of bright red blood in vagina on exam.  Tachycardia to 110-120s with BP stable 125/75. Pt then examined with GYN attending Norma Arciniega and Raj Luciano at bedside given continued vaginal bleeding and IR was consulted. Zheng catheter and cervical balloon placed at bedside, 2uPRBCs started, 2nd line placed. IR consult placed awaiting discussion with attending. At that time patient with increased tachycardia to 120-130s with BPs /40-60s in setting of acute bleeding. Decision made to move pt to trauma room as vaginal bleeding temporized with cervical balloon.     In trauma room patient became slow to respond and difficult to arouse while 2nd uPRBCs running. He pt HR 60-80, BP 90s/40-50s.     OBHx:  FT  7#5oz,  FT  7#1oz,  FT  6#1oz, TOP x1 (, )  GYNHx: denies 35yo  status post IM and intra-sac MTX on 3/1 for cervical ectopic pregnancy presents to ED with c/o abnormal vaginal bleeding x4wks. Pt previously admitted  with complaint of intermittent vaginal bleeding that began on  filling <2ppd. During prior admission she was rescanned by ATU and no evidence of pregnancy was seen so she was discharged with plan to follow up in clinic as her bleeding was stable at that time. Patient's bHCG has been down trending from 667236(3/1)->48(5/15)->15().    [facility] to [facility] bleeding 4ppd for 2-3wks with acute onset of heavy vaginal bleeding today since 9am. Pt has been filling 1-2pads per hour since 9am and passing multiple large clots. Per pt her bleeding comes in episodes associated with cramping, passage of clots, and dizziness. At home she felt like she was going to pass out and so she came to ED. Admits to dizziness/lightheadedness, nausea, chills and cramping pelvic pain. Since being in the ED she passed multiple large clots with noted improvement in abdominal cramping. While at sitting or laying down pt denies dizziness/lightheadedness, feeling faint, CP/SOB, palpitations, nausea upon initial exam at 7pm. Upon initial exam, 200cc clotted blood noted at bedside with slow bleeding from cervix. Pt placed on monitor while at bedside for continuous monitoring with HR 90s and BP stable 110-120/60s.    Pt reexamined status post TVUS due to report 2nd episode of vaginal bleeding. At that time pt with 150cc clotted blood and pooling of bright red blood in vagina on exam.  Tachycardia to 110-120s with BP stable 125/75. Pt then examined with GYN attending Norma Arciniega and Raj Luciano at bedside given continued vaginal bleeding and IR was consulted. Zheng catheter and cervical balloon placed at bedside, 2uPRBCs started, 2nd line placed. IR consult placed awaiting discussion with attending. At that time patient with increased tachycardia to 120-130s with BPs /40-60s in setting of acute bleeding. Decision made to move pt to trauma room as vaginal bleeding temporized with cervical balloon.     In trauma room patient became slow to respond and difficult to arouse while 2nd uPRBCs running. Pt HR 60-80, BP 90s/40-50s.    OBHx:  FT  7#5oz,  FT  7#1oz,  FT  6#1oz, TOP x1 (, )  GYNHx: denies 33yo  status post IM and intra-sac MTX on 3/1 for cervical ectopic pregnancy presents to ED with c/o abnormal vaginal bleeding x4wks. Pt previously admitted  with complaint of intermittent vaginal bleeding that began on  filling <2ppd. During prior admission she was rescanned by ATU and no evidence of pregnancy was seen so she was discharged with plan to follow up in clinic as her bleeding was stable at that time. Patient's bHCG has been down trending from [id number](3/1)->48(5/15)->15().    [facility] 4ppd for 2-3wks with acute onset of heavy vaginal bleeding today since 9am. Pt has been filling 1-2pads per hour since 9am and passing multiple large clots. Per pt her bleeding comes in episodes associated with cramping, passage of clots, and dizziness. At home she felt like she was going to pass out and so she came to ED. Admits to dizziness/lightheadedness, nausea, chills and cramping pelvic pain. Since being in the ED she passed multiple large clots with noted improvement in abdominal cramping. While at sitting or laying down pt denies dizziness/lightheadedness, feeling faint, CP/SOB, palpitations, nausea upon initial exam at 7pm. Upon initial exam, 200cc clotted blood noted at bedside with slow bleeding from cervix. Pt placed on monitor while at bedside for continuous monitoring with HR 90s and BP stable 110-120/60s.    Pt reexamined status post TVUS due to report 2nd episode of vaginal bleeding. At that time pt with 200cc clotted blood and pooling of bright red blood in vagina on exam.  Tachycardia to 110-130s with BP stable 125/75. Given continued vaginal bleeding pt then examined with GYN attending Norma Arciniega and Raj Luciano at bedside and additional 300cc bright red blood noted. IR consulted in setting of continued vaginal bleeding. Zheng catheter and cervical balloon placed at bedside, 2uPRBCs started, 2nd line placed. At that time patient with hypotensive to 60s/30s in setting of acute bleeding. While awaiting for IR to call back, decision made to move pt to trauma room as vaginal bleeding temporized with cervical balloon.     In trauma room while awaiting for IR to call back, patient became slow to respond and difficult to arouse while 2nd uPRBCs running. Pt HR 60-80, BP 90s/40-50s. Vaginal exam revealed cervical balloon in place with minimal drainage. Coco Moon and resident Sherly at bedside. With 3rd uPRBCs pt clinically improved, alert and responsive, BPs 100-110s/50-70s.     After discussion with IR attending plan for embolization and possible D&C/hysterectomy if continued vaginal bleeding.     OBHx:  FT  7#5oz,  FT  7#1oz,  FT  6#1oz, TOP x1 (, )  GYNHx: denies

## 2019-06-04 NOTE — CONSULT NOTE ADULT - SUBJECTIVE AND OBJECTIVE BOX
pt seen in ED, full note to follow pending CBC, BMP, lactate, coag, T&Sx2, bHCG, TVUS. Please call with any change in status, recommend continuous monitoring HPI:  33yo  status post IM and intra-sac MTX on 3/1 for cervical ectopic pregnancy presents to ED with c/o abnormal vaginal bleeding x4wks. Per patient she has been bleeding 4ppd for 2-3wks with acute onset of heavy vaginal bleeding today since 9am. Pt has been filling 1-2pads per hour since 9am and passing multiple large clots. Per pt her bleeding comes in episodes associated with cramping, passage of clots, and dizziness. At home she felt like she was going to pass out and so she came to ED. Admits to dizziness/lightheadedness, nausea, chills and cramping pelvic pain. Since being in the ED she passed multiple large clots with noted improvement in abdominal cramping. While at sitting or laying down pt denies dizziness/lightheadedness, feeling faint, CP/SOB, palpitations, nausea.     Pt previously admitted  with complaint of intermittent vaginal bleeding that began on  filling <2ppd. During prior admission she was rescanned by ATU and no evidence of pregnancy was seen so she was discharged with plan to follow up in clinic. Patient's bHCG has been down trending from [id number](3/1)->48(5/15)->15().    OBHx:  FT  7#5oz,  FT  7#1oz,  FT  6#1oz, TOP x1 (, )  GYNHx: denies    PAST MEDICAL & SURGICAL HISTORY:  Anxiety  Depression  H/O shoulder surgery: 19    Allergies    No Known Drug Allergies  peanuts (Anaphylaxis)  Sesame (Unknown)    Intolerances    MEDICATIONS:  denies     FAMILY HISTORY:  No pertinent family history in first degree relatives      SOCIAL HISTORY:   denies etoh abuse, denies tobacco use, denies drug use.       Vital Signs Last 24 Hrs  T(C): 36.6 (2019 17:00), Max: 36.6 (2019 17:00)  T(F): 97.9 (2019 17:00), Max: 97.9 (2019 17:00)  HR: 103 (2019 19:00) (101 - 103)  BP: 122/62 (2019 19:00) (116/66 - 122/62)  BP(mean): --  RR: 18 (2019 19:00) (18 - 18)  SpO2: 98% (2019 19:00) (98% - 100%)    PHYSICAL EXAM:    Constitutional: alert and oriented x 3    Respiratory: clear to ascultation bilaterally     Cardiovascular: regular rate (94bpm) and rhythm, no murmur    Gastrointestinal: soft, non-tender, non-distended,  no rebound/guarding, + bowel sounds. No organomegaly, no palpable masses    Genitourinary: Normal external female  exam  Cervix: open, no CMT  Vaginal: normal vaginal mucosa, +blood and clots in vault, approx 200cc clotted blood at bedside and passed prior by pt   Uterus: Normal size, non tender  Adnexa: Non tender bilaterally, no palpable masses    Rectal: deferred    Extremities: Non-tender bilaterally, No edema    Neurological: Grossly intact            LABS:                        11.1   14.49 )-----------( 371      ( 2019 18:55 )             35.5     06-04    138  |  100  |  11  ----------------------------<  91  3.9   |  26  |  0.93    Ca    9.3      2019 18:55    TPro  7.0  /  Alb  3.8  /  TBili  0.3  /  DBili  x   /  AST  26  /  ALT  32  /  AlkPhos  50  06-04    PT/INR - ( 2019 18:55 )   PT: 12.0 SEC;   INR: 1.08          PTT - ( 2019 18:55 )  PTT:31.8 SEC          RADIOLOGY & ADDITIONAL STUDIES:

## 2019-06-04 NOTE — CHART NOTE - NSCHARTNOTEFT_GEN_A_CORE
GYN Service Attending Note     33 yo  LMP 2019 with cervical ectopic who received MTX intra-sac and IM on 3/1 presents to the ER for bleeding   PT reports she has been bleeding since  March. Pt presented to the ER today due to increased bleeding   In ER, she had initial episode of 200 cc bright red blood   hcg < 5  hgb 11  TVUS showed hypoechoic lesion with pulsation, no concern from retained PCOS   Came down to see patient and she had 2nd episode of bleeding about 300 cc   She became hypotensive to 60/30s, started blood transfusion 2 U PRBCs  Cervical balloon placed with 30 cc   Bleeding stopped   IR consulted  While waiting for IR to call back, pt had 3rd episode 200 cc of bleeding and hypotension   Receiving 3 U PRBCS   On exam no further bleeding   IR attending called back and will precede with embolization   If pt continues to bleeding will do D & C and possible hysterectomy  will give FFP given received 3 U PRBCS   all events reviewed with pt and she is in agreement with the plan.     PT seen with PGY 2 Julio, PGY 4 Sherly, and Dr. Kelli Lind in house and he discussed case with Dr. Cathy Sepulveda MD MSc

## 2019-06-04 NOTE — ED ADULT NURSE REASSESSMENT NOTE - NS ED NURSE REASSESS COMMENT FT1
Third transfusion of a unit of PRBCs initiated. Pts BP improving, vital signs as noted. GYN Resident at bedside. Will continue to monitor closely.

## 2019-06-04 NOTE — ED ADULT NURSE REASSESSMENT NOTE - NS ED NURSE REASSESS COMMENT FT1
Pt arrived from Intake. Second unit of PRBCs infusing as fast as possible, Third Saline bag infusing. Pts vitals as noted, pt hypotensive. GYN Resident at bedside for evaluation. Will continue to monitor closely.

## 2019-06-05 DIAGNOSIS — N93.9 ABNORMAL UTERINE AND VAGINAL BLEEDING, UNSPECIFIED: ICD-10-CM

## 2019-06-05 LAB
ANION GAP SERPL CALC-SCNC: 11 MMO/L — SIGNIFICANT CHANGE UP (ref 7–14)
ANION GAP SERPL CALC-SCNC: 9 MMO/L — SIGNIFICANT CHANGE UP (ref 7–14)
APTT BLD: 24.6 SEC — LOW (ref 27.5–36.3)
BASOPHILS # BLD AUTO: 0.02 K/UL — SIGNIFICANT CHANGE UP (ref 0–0.2)
BASOPHILS NFR BLD AUTO: 0.1 % — SIGNIFICANT CHANGE UP (ref 0–2)
BUN SERPL-MCNC: 11 MG/DL — SIGNIFICANT CHANGE UP (ref 7–23)
BUN SERPL-MCNC: 8 MG/DL — SIGNIFICANT CHANGE UP (ref 7–23)
CALCIUM SERPL-MCNC: 7.5 MG/DL — LOW (ref 8.4–10.5)
CALCIUM SERPL-MCNC: 8 MG/DL — LOW (ref 8.4–10.5)
CHLORIDE SERPL-SCNC: 104 MMOL/L — SIGNIFICANT CHANGE UP (ref 98–107)
CHLORIDE SERPL-SCNC: 106 MMOL/L — SIGNIFICANT CHANGE UP (ref 98–107)
CO2 SERPL-SCNC: 20 MMOL/L — LOW (ref 22–31)
CO2 SERPL-SCNC: 22 MMOL/L — SIGNIFICANT CHANGE UP (ref 22–31)
CREAT SERPL-MCNC: 0.75 MG/DL — SIGNIFICANT CHANGE UP (ref 0.5–1.3)
CREAT SERPL-MCNC: 0.9 MG/DL — SIGNIFICANT CHANGE UP (ref 0.5–1.3)
EOSINOPHIL # BLD AUTO: 0 K/UL — SIGNIFICANT CHANGE UP (ref 0–0.5)
EOSINOPHIL NFR BLD AUTO: 0 % — SIGNIFICANT CHANGE UP (ref 0–6)
FIBRINOGEN PPP-MCNC: 413.9 MG/DL — SIGNIFICANT CHANGE UP (ref 350–510)
GLUCOSE SERPL-MCNC: 141 MG/DL — HIGH (ref 70–99)
GLUCOSE SERPL-MCNC: 176 MG/DL — HIGH (ref 70–99)
HCT VFR BLD CALC: 31.8 % — LOW (ref 34.5–45)
HCT VFR BLD CALC: 32.9 % — LOW (ref 34.5–45)
HCT VFR BLD CALC: 33.5 % — LOW (ref 34.5–45)
HCT VFR BLD CALC: 34.4 % — LOW (ref 34.5–45)
HCT VFR BLD CALC: 35.7 % — SIGNIFICANT CHANGE UP (ref 34.5–45)
HCT VFR BLD CALC: 37.3 % — SIGNIFICANT CHANGE UP (ref 34.5–45)
HGB BLD-MCNC: 10.7 G/DL — LOW (ref 11.5–15.5)
HGB BLD-MCNC: 10.8 G/DL — LOW (ref 11.5–15.5)
HGB BLD-MCNC: 11.3 G/DL — LOW (ref 11.5–15.5)
HGB BLD-MCNC: 11.4 G/DL — LOW (ref 11.5–15.5)
HGB BLD-MCNC: 11.6 G/DL — SIGNIFICANT CHANGE UP (ref 11.5–15.5)
HGB BLD-MCNC: 12.3 G/DL — SIGNIFICANT CHANGE UP (ref 11.5–15.5)
IMM GRANULOCYTES NFR BLD AUTO: 0.7 % — SIGNIFICANT CHANGE UP (ref 0–1.5)
INR BLD: 1.11 — SIGNIFICANT CHANGE UP (ref 0.88–1.17)
INR BLD: 1.14 — SIGNIFICANT CHANGE UP (ref 0.88–1.17)
LYMPHOCYTES # BLD AUTO: 1.65 K/UL — SIGNIFICANT CHANGE UP (ref 1–3.3)
LYMPHOCYTES # BLD AUTO: 8 % — LOW (ref 13–44)
MAGNESIUM SERPL-MCNC: 1.5 MG/DL — LOW (ref 1.6–2.6)
MAGNESIUM SERPL-MCNC: 2.3 MG/DL — SIGNIFICANT CHANGE UP (ref 1.6–2.6)
MCHC RBC-ENTMCNC: 28.2 PG — SIGNIFICANT CHANGE UP (ref 27–34)
MCHC RBC-ENTMCNC: 28.4 PG — SIGNIFICANT CHANGE UP (ref 27–34)
MCHC RBC-ENTMCNC: 28.6 PG — SIGNIFICANT CHANGE UP (ref 27–34)
MCHC RBC-ENTMCNC: 28.6 PG — SIGNIFICANT CHANGE UP (ref 27–34)
MCHC RBC-ENTMCNC: 28.8 PG — SIGNIFICANT CHANGE UP (ref 27–34)
MCHC RBC-ENTMCNC: 28.8 PG — SIGNIFICANT CHANGE UP (ref 27–34)
MCHC RBC-ENTMCNC: 32.5 % — SIGNIFICANT CHANGE UP (ref 32–36)
MCHC RBC-ENTMCNC: 32.8 % — SIGNIFICANT CHANGE UP (ref 32–36)
MCHC RBC-ENTMCNC: 33 % — SIGNIFICANT CHANGE UP (ref 32–36)
MCHC RBC-ENTMCNC: 33.1 % — SIGNIFICANT CHANGE UP (ref 32–36)
MCHC RBC-ENTMCNC: 33.6 % — SIGNIFICANT CHANGE UP (ref 32–36)
MCHC RBC-ENTMCNC: 33.7 % — SIGNIFICANT CHANGE UP (ref 32–36)
MCV RBC AUTO: 85.2 FL — SIGNIFICANT CHANGE UP (ref 80–100)
MCV RBC AUTO: 85.6 FL — SIGNIFICANT CHANGE UP (ref 80–100)
MCV RBC AUTO: 85.7 FL — SIGNIFICANT CHANGE UP (ref 80–100)
MCV RBC AUTO: 85.9 FL — SIGNIFICANT CHANGE UP (ref 80–100)
MCV RBC AUTO: 86.7 FL — SIGNIFICANT CHANGE UP (ref 80–100)
MCV RBC AUTO: 87.9 FL — SIGNIFICANT CHANGE UP (ref 80–100)
MONOCYTES # BLD AUTO: 0.2 K/UL — SIGNIFICANT CHANGE UP (ref 0–0.9)
MONOCYTES NFR BLD AUTO: 1 % — LOW (ref 2–14)
NEUTROPHILS # BLD AUTO: 18.53 K/UL — HIGH (ref 1.8–7.4)
NEUTROPHILS NFR BLD AUTO: 90.2 % — HIGH (ref 43–77)
NRBC # FLD: 0 K/UL — SIGNIFICANT CHANGE UP (ref 0–0)
NRBC # FLD: 0.02 K/UL — SIGNIFICANT CHANGE UP (ref 0–0)
NRBC # FLD: 0.02 K/UL — SIGNIFICANT CHANGE UP (ref 0–0)
PHOSPHATE SERPL-MCNC: 1.9 MG/DL — LOW (ref 2.5–4.5)
PHOSPHATE SERPL-MCNC: 3.1 MG/DL — SIGNIFICANT CHANGE UP (ref 2.5–4.5)
PLATELET # BLD AUTO: 241 K/UL — SIGNIFICANT CHANGE UP (ref 150–400)
PLATELET # BLD AUTO: 254 K/UL — SIGNIFICANT CHANGE UP (ref 150–400)
PLATELET # BLD AUTO: 258 K/UL — SIGNIFICANT CHANGE UP (ref 150–400)
PLATELET # BLD AUTO: 260 K/UL — SIGNIFICANT CHANGE UP (ref 150–400)
PLATELET # BLD AUTO: 263 K/UL — SIGNIFICANT CHANGE UP (ref 150–400)
PLATELET # BLD AUTO: 275 K/UL — SIGNIFICANT CHANGE UP (ref 150–400)
PMV BLD: 10.2 FL — SIGNIFICANT CHANGE UP (ref 7–13)
PMV BLD: 10.2 FL — SIGNIFICANT CHANGE UP (ref 7–13)
PMV BLD: 10.3 FL — SIGNIFICANT CHANGE UP (ref 7–13)
PMV BLD: 10.3 FL — SIGNIFICANT CHANGE UP (ref 7–13)
PMV BLD: 10.4 FL — SIGNIFICANT CHANGE UP (ref 7–13)
PMV BLD: 10.9 FL — SIGNIFICANT CHANGE UP (ref 7–13)
POTASSIUM SERPL-MCNC: 4 MMOL/L — SIGNIFICANT CHANGE UP (ref 3.5–5.3)
POTASSIUM SERPL-MCNC: 4.7 MMOL/L — SIGNIFICANT CHANGE UP (ref 3.5–5.3)
POTASSIUM SERPL-SCNC: 4 MMOL/L — SIGNIFICANT CHANGE UP (ref 3.5–5.3)
POTASSIUM SERPL-SCNC: 4.7 MMOL/L — SIGNIFICANT CHANGE UP (ref 3.5–5.3)
PROTHROM AB SERPL-ACNC: 12.7 SEC — SIGNIFICANT CHANGE UP (ref 9.8–13.1)
PROTHROM AB SERPL-ACNC: 12.7 SEC — SIGNIFICANT CHANGE UP (ref 9.8–13.1)
RBC # BLD: 3.71 M/UL — LOW (ref 3.8–5.2)
RBC # BLD: 3.83 M/UL — SIGNIFICANT CHANGE UP (ref 3.8–5.2)
RBC # BLD: 3.93 M/UL — SIGNIFICANT CHANGE UP (ref 3.8–5.2)
RBC # BLD: 4.02 M/UL — SIGNIFICANT CHANGE UP (ref 3.8–5.2)
RBC # BLD: 4.06 M/UL — SIGNIFICANT CHANGE UP (ref 3.8–5.2)
RBC # BLD: 4.3 M/UL — SIGNIFICANT CHANGE UP (ref 3.8–5.2)
RBC # FLD: 13.4 % — SIGNIFICANT CHANGE UP (ref 10.3–14.5)
RBC # FLD: 13.6 % — SIGNIFICANT CHANGE UP (ref 10.3–14.5)
RBC # FLD: 13.7 % — SIGNIFICANT CHANGE UP (ref 10.3–14.5)
RBC # FLD: 13.8 % — SIGNIFICANT CHANGE UP (ref 10.3–14.5)
RBC # FLD: 13.9 % — SIGNIFICANT CHANGE UP (ref 10.3–14.5)
RBC # FLD: 14 % — SIGNIFICANT CHANGE UP (ref 10.3–14.5)
SODIUM SERPL-SCNC: 135 MMOL/L — SIGNIFICANT CHANGE UP (ref 135–145)
SODIUM SERPL-SCNC: 137 MMOL/L — SIGNIFICANT CHANGE UP (ref 135–145)
WBC # BLD: 14.62 K/UL — HIGH (ref 3.8–10.5)
WBC # BLD: 15.58 K/UL — HIGH (ref 3.8–10.5)
WBC # BLD: 17.02 K/UL — HIGH (ref 3.8–10.5)
WBC # BLD: 17.69 K/UL — HIGH (ref 3.8–10.5)
WBC # BLD: 20.54 K/UL — HIGH (ref 3.8–10.5)
WBC # BLD: 27.32 K/UL — HIGH (ref 3.8–10.5)
WBC # FLD AUTO: 14.62 K/UL — HIGH (ref 3.8–10.5)
WBC # FLD AUTO: 15.58 K/UL — HIGH (ref 3.8–10.5)
WBC # FLD AUTO: 17.02 K/UL — HIGH (ref 3.8–10.5)
WBC # FLD AUTO: 17.69 K/UL — HIGH (ref 3.8–10.5)
WBC # FLD AUTO: 20.54 K/UL — HIGH (ref 3.8–10.5)
WBC # FLD AUTO: 27.32 K/UL — HIGH (ref 3.8–10.5)

## 2019-06-05 PROCEDURE — 76937 US GUIDE VASCULAR ACCESS: CPT | Mod: 26

## 2019-06-05 PROCEDURE — 36247 INS CATH ABD/L-EXT ART 3RD: CPT

## 2019-06-05 PROCEDURE — 99235 HOSP IP/OBS SAME DATE MOD 70: CPT

## 2019-06-05 PROCEDURE — 37244 VASC EMBOLIZE/OCCLUDE BLEED: CPT

## 2019-06-05 PROCEDURE — 99291 CRITICAL CARE FIRST HOUR: CPT

## 2019-06-05 RX ORDER — METOCLOPRAMIDE HCL 10 MG
10 TABLET ORAL ONCE
Refills: 0 | Status: DISCONTINUED | OUTPATIENT
Start: 2019-06-05 | End: 2019-06-05

## 2019-06-05 RX ORDER — HYDROMORPHONE HYDROCHLORIDE 2 MG/ML
0.5 INJECTION INTRAMUSCULAR; INTRAVENOUS; SUBCUTANEOUS
Refills: 0 | Status: DISCONTINUED | OUTPATIENT
Start: 2019-06-05 | End: 2019-06-05

## 2019-06-05 RX ORDER — NALOXONE HYDROCHLORIDE 4 MG/.1ML
0.1 SPRAY NASAL
Refills: 0 | Status: DISCONTINUED | OUTPATIENT
Start: 2019-06-05 | End: 2019-06-06

## 2019-06-05 RX ORDER — CIPROFLOXACIN LACTATE 400MG/40ML
400 VIAL (ML) INTRAVENOUS EVERY 12 HOURS
Refills: 0 | Status: COMPLETED | OUTPATIENT
Start: 2019-06-05 | End: 2019-06-06

## 2019-06-05 RX ORDER — CHLORHEXIDINE GLUCONATE 213 G/1000ML
1 SOLUTION TOPICAL
Refills: 0 | Status: DISCONTINUED | OUTPATIENT
Start: 2019-06-05 | End: 2019-06-05

## 2019-06-05 RX ORDER — LIDOCAINE 4 G/100G
1 CREAM TOPICAL EVERY 24 HOURS
Refills: 0 | Status: DISCONTINUED | OUTPATIENT
Start: 2019-06-05 | End: 2019-06-07

## 2019-06-05 RX ORDER — HYDROMORPHONE HYDROCHLORIDE 2 MG/ML
30 INJECTION INTRAMUSCULAR; INTRAVENOUS; SUBCUTANEOUS
Refills: 0 | Status: DISCONTINUED | OUTPATIENT
Start: 2019-06-05 | End: 2019-06-06

## 2019-06-05 RX ORDER — FENTANYL CITRATE 50 UG/ML
25 INJECTION INTRAVENOUS
Refills: 0 | Status: DISCONTINUED | OUTPATIENT
Start: 2019-06-05 | End: 2019-06-05

## 2019-06-05 RX ORDER — MAGNESIUM SULFATE 500 MG/ML
2 VIAL (ML) INJECTION ONCE
Refills: 0 | Status: COMPLETED | OUTPATIENT
Start: 2019-06-05 | End: 2019-06-05

## 2019-06-05 RX ORDER — NALBUPHINE HYDROCHLORIDE 10 MG/ML
5 INJECTION, SOLUTION INTRAMUSCULAR; INTRAVENOUS; SUBCUTANEOUS EVERY 6 HOURS
Refills: 0 | Status: DISCONTINUED | OUTPATIENT
Start: 2019-06-05 | End: 2019-06-06

## 2019-06-05 RX ORDER — ACETAMINOPHEN 500 MG
1000 TABLET ORAL ONCE
Refills: 0 | Status: DISCONTINUED | OUTPATIENT
Start: 2019-06-05 | End: 2019-06-07

## 2019-06-05 RX ORDER — SODIUM CHLORIDE 9 MG/ML
1000 INJECTION, SOLUTION INTRAVENOUS
Refills: 0 | Status: DISCONTINUED | OUTPATIENT
Start: 2019-06-05 | End: 2019-06-06

## 2019-06-05 RX ORDER — ONDANSETRON 8 MG/1
4 TABLET, FILM COATED ORAL ONCE
Refills: 0 | Status: DISCONTINUED | OUTPATIENT
Start: 2019-06-05 | End: 2019-06-05

## 2019-06-05 RX ORDER — ONDANSETRON 8 MG/1
4 TABLET, FILM COATED ORAL EVERY 6 HOURS
Refills: 0 | Status: DISCONTINUED | OUTPATIENT
Start: 2019-06-05 | End: 2019-06-06

## 2019-06-05 RX ORDER — HYDROMORPHONE HYDROCHLORIDE 2 MG/ML
0.25 INJECTION INTRAMUSCULAR; INTRAVENOUS; SUBCUTANEOUS
Refills: 0 | Status: DISCONTINUED | OUTPATIENT
Start: 2019-06-05 | End: 2019-06-05

## 2019-06-05 RX ORDER — ACETAMINOPHEN 500 MG
1000 TABLET ORAL ONCE
Refills: 0 | Status: COMPLETED | OUTPATIENT
Start: 2019-06-05 | End: 2019-06-05

## 2019-06-05 RX ORDER — HYDROMORPHONE HYDROCHLORIDE 2 MG/ML
0.5 INJECTION INTRAMUSCULAR; INTRAVENOUS; SUBCUTANEOUS
Refills: 0 | Status: DISCONTINUED | OUTPATIENT
Start: 2019-06-05 | End: 2019-06-06

## 2019-06-05 RX ORDER — KETOROLAC TROMETHAMINE 30 MG/ML
15 SYRINGE (ML) INJECTION EVERY 6 HOURS
Refills: 0 | Status: DISCONTINUED | OUTPATIENT
Start: 2019-06-05 | End: 2019-06-05

## 2019-06-05 RX ORDER — CHLORHEXIDINE GLUCONATE 213 G/1000ML
1 SOLUTION TOPICAL
Refills: 0 | Status: DISCONTINUED | OUTPATIENT
Start: 2019-06-05 | End: 2019-06-06

## 2019-06-05 RX ADMIN — Medication 50 GRAM(S): at 06:04

## 2019-06-05 RX ADMIN — HYDROMORPHONE HYDROCHLORIDE 0.5 MILLIGRAM(S): 2 INJECTION INTRAMUSCULAR; INTRAVENOUS; SUBCUTANEOUS at 05:40

## 2019-06-05 RX ADMIN — LIDOCAINE 1 PATCH: 4 CREAM TOPICAL at 17:50

## 2019-06-05 RX ADMIN — HYDROMORPHONE HYDROCHLORIDE 0.5 MILLIGRAM(S): 2 INJECTION INTRAMUSCULAR; INTRAVENOUS; SUBCUTANEOUS at 04:30

## 2019-06-05 RX ADMIN — HYDROMORPHONE HYDROCHLORIDE 0.5 MILLIGRAM(S): 2 INJECTION INTRAMUSCULAR; INTRAVENOUS; SUBCUTANEOUS at 05:25

## 2019-06-05 RX ADMIN — Medication 400 MILLIGRAM(S): at 09:03

## 2019-06-05 RX ADMIN — Medication 200 MILLIGRAM(S): at 14:58

## 2019-06-05 RX ADMIN — HYDROMORPHONE HYDROCHLORIDE 0.5 MILLIGRAM(S): 2 INJECTION INTRAMUSCULAR; INTRAVENOUS; SUBCUTANEOUS at 04:43

## 2019-06-05 RX ADMIN — FENTANYL CITRATE 25 MICROGRAM(S): 50 INJECTION INTRAVENOUS at 04:15

## 2019-06-05 RX ADMIN — FENTANYL CITRATE 25 MICROGRAM(S): 50 INJECTION INTRAVENOUS at 04:01

## 2019-06-05 RX ADMIN — HYDROMORPHONE HYDROCHLORIDE 0.5 MILLIGRAM(S): 2 INJECTION INTRAMUSCULAR; INTRAVENOUS; SUBCUTANEOUS at 05:00

## 2019-06-05 RX ADMIN — Medication 15 MILLIGRAM(S): at 05:27

## 2019-06-05 RX ADMIN — Medication 15 MILLIGRAM(S): at 05:46

## 2019-06-05 RX ADMIN — FENTANYL CITRATE 25 MICROGRAM(S): 50 INJECTION INTRAVENOUS at 03:55

## 2019-06-05 RX ADMIN — SODIUM CHLORIDE 125 MILLILITER(S): 9 INJECTION, SOLUTION INTRAVENOUS at 10:00

## 2019-06-05 RX ADMIN — Medication 1000 MILLIGRAM(S): at 09:15

## 2019-06-05 RX ADMIN — Medication 15 MILLIGRAM(S): at 12:36

## 2019-06-05 RX ADMIN — HYDROMORPHONE HYDROCHLORIDE 30 MILLILITER(S): 2 INJECTION INTRAMUSCULAR; INTRAVENOUS; SUBCUTANEOUS at 05:55

## 2019-06-05 RX ADMIN — Medication 15 MILLIGRAM(S): at 12:21

## 2019-06-05 RX ADMIN — SODIUM CHLORIDE 125 MILLILITER(S): 9 INJECTION, SOLUTION INTRAVENOUS at 04:44

## 2019-06-05 RX ADMIN — FENTANYL CITRATE 25 MICROGRAM(S): 50 INJECTION INTRAVENOUS at 04:05

## 2019-06-05 RX ADMIN — HYDROMORPHONE HYDROCHLORIDE 30 MILLILITER(S): 2 INJECTION INTRAMUSCULAR; INTRAVENOUS; SUBCUTANEOUS at 10:40

## 2019-06-05 RX ADMIN — HYDROMORPHONE HYDROCHLORIDE 30 MILLILITER(S): 2 INJECTION INTRAMUSCULAR; INTRAVENOUS; SUBCUTANEOUS at 19:23

## 2019-06-05 RX ADMIN — FENTANYL CITRATE 25 MICROGRAM(S): 50 INJECTION INTRAVENOUS at 04:25

## 2019-06-05 RX ADMIN — LIDOCAINE 1 PATCH: 4 CREAM TOPICAL at 12:21

## 2019-06-05 RX ADMIN — ONDANSETRON 4 MILLIGRAM(S): 8 TABLET, FILM COATED ORAL at 00:06

## 2019-06-05 NOTE — PROGRESS NOTE ADULT - ASSESSMENT
ASSESSMENT:  Patient is a 34 year old female  S/P IM and intra-sac MTX on 3/1 for cervical ectopic pregnancy who presented to the ED with complaint of abnormal vaginal bleeding x4 weeks.  Patient previously admitted  with complaint of intermittent vaginal bleeding that began on  filling <2 pads per day.  During prior admission she was rescanned by ATU and no evidence of pregnancy was seen so she was discharged with plan to follow up in clinic as her bleeding was stable at that time.  Patient's bHCG has been down trending from 053279(3/1)->48(5/15)->15().  Now S/P successful embolization of bilateral uterine arteries with Avitene slurry.       PLAN:    NEUROLOGY:  - Alert and oriented  - Continue with PCA pump and IV Tylenol for pain control    RESPIRATORY:  - Saturating well on room air  - Maintain O2 saturation >92%    CARDIOVASCULAR:  - Normotensive.  No pressor requirements  - Keep MAP >65    GI / NUTRITION:  - Currently NPO    RENAL / GENITOURINARY:  - Indwelling berry catheter  - Monitor electrolytes and replete PRN  - Continue to monitor strict ins and outs q1 hour  - OB / GYN to decide when cervical balloon is removed    HEMATOLOGIC:  - Hemoglobin and hematocrit remain stable  - Continue to monitor CBC q4 hours     INFECTIOUS DISEASE:  - Currently afebrile with no leukocytosis  - Continue with IV Ciprofloxacin x3 doses    ENDOCRINOLOGY:  - No active issues  - Continue to monitor glucose on BMP (goal 140-180)      Disposition: SICU    --------------------------------------------------------------------------------------

## 2019-06-05 NOTE — PROGRESS NOTE ADULT - SUBJECTIVE AND OBJECTIVE BOX
HPI:  33 y/o  status post IM and intra-sac MTX on 3/1 for cervical ectopic pregnancy presenting with vaginal bleeding s/p b/l UAE by IR. Pt was seen at bedside with no complaints. Denied fever, NVD, CP, SOB, abdominal pain, numbness/tingling in b/l LE.     Physical Exam:    Vital Signs Last 24 Hrs  T(C): 36.5 (2019 12:00), Max: 37.4 (2019 09:52)  T(F): 97.7 (2019 12:00), Max: 99.3 (2019 09:52)  HR: 80 (2019 15:00) (70 - 124)  BP: 133/67 (2019 14:00) (102/71 - 135/83)  BP(mean): 81 (2019 14:00) (70 - 99)  RR: 19 (2019 15:00) (13 - 26)  SpO2: 98% (2019 15:00) (95% - 100%)    GENERAL APPEARANCE: Well developed, well nourished, in no acute distress.    LUNGS: Auscultation of the lungs revealed normal breath sounds without any other adventitious sounds or rubs.    CARDIOVASCULAR: There was a regular rate and rhythm without any murmurs, gallops, rubs.     ABDOMEN: Soft and nontender with normal bowel sounds.     NEUROLOGIC: Alert and oriented x 3. Normal affect.     GROIN: Catheter access site in right groin was without hematoma or bleeding.     CBC                        11.4   14.62 )-----------( 260      ( 2019 12:02 )             34.4       Chemistry  06-05    135  |  104  |  8   ----------------------------<  176<H>  4.0   |  22  |  0.75    Ca    8.0<L>      2019 08:00  Phos  1.9     06-05  Mg     2.3     06-05    TPro  7.0  /  Alb  3.8  /  TBili  0.3  /  DBili  x   /  AST  26  /  ALT  32  /  AlkPhos  50  06-04    Coags:  PT/INR - ( 2019 08:00 )   PT: 12.7 SEC;   INR: 1.11     PTT - ( 2019 23:58 )  PTT:24.6 SEC

## 2019-06-05 NOTE — PROGRESS NOTE ADULT - ASSESSMENT
35yo F s/p b/l UAE for vaginal bleeding  - Hemodynamically stable  - Pt with no complaints  - Right groin is without hematoma/bleeding  - Management as per primary team

## 2019-06-05 NOTE — CHART NOTE - NSCHARTNOTEFT_GEN_A_CORE
Called in to see pt 2 pt with hx of cervical ectopic S/P IM MTX and intra sac injection in 3/2019.  Since then, pt with continuous bleeding.  Pt has not been compliant with F/U.    Today pt with BHCG<5 and pt with heavy bleeding.  Cervical balloon placed.  IR called to do UAE.    Fluoroscopy revealed tortuous vessels in area of uterine artery bilaterally.  A 'slurry' of gelfoam placed by IR.  Cervical balloon remained in-situ.  Pt remained stable throughout procedure.    Will  1) Tx to PACU  2) Cont with cervical balloon and berry  3) Serial CBC  4) Pad checks  5) Tx with one unit of FFP     Will follow    YAJAIRA Lind

## 2019-06-05 NOTE — CONSULT NOTE ADULT - SUBJECTIVE AND OBJECTIVE BOX
SICU Consultation Note  =====================================================    HPI: 34y Female    35 y/o  status post IM and intra-sac MTX on 3/1 for cervical ectopic pregnancy presents to ED with c/o abnormal vaginal bleeding x 4wks. Pt previously admitted  with complaint of intermittent vaginal bleeding that began on  filling <2ppd. During prior admission she was rescanned by ATU and no evidence of pregnancy was seen so she was discharged with plan to follow up in clinic as her bleeding was stable at that time. Patient's bHCG has been down trending from 471775(3/1)->48(5/15)->15().    Toosvaldo patient admits to vaginal bleeding 4ppd for 2-3wks with acute onset of heavy vaginal bleeding today since 9am. Pt has been filling 1-2pads per hour since 9 am and passing multiple large clots. Per pt her bleeding comes in episodes associated with cramping, passage of clots, and dizziness. At home she felt like she was going to pass out and so she came to ED. Admits to dizziness/lightheadedness, nausea, chills and cramping pelvic pain. Since being in the ED she passed multiple large clots with noted improvement in abdominal cramping. While at sitting or laying down pt denies dizziness/lightheadedness, feeling faint, CP/SOB, palpitations, nausea upon initial exam at 7pm. Upon initial exam, 200cc clotted blood noted at bedside with slow bleeding from cervix. Pt placed on monitor while at bedside for continuous monitoring with HR 90s and BP stable 110-120/60s.    Pt reexamined status post TVUS due to report 2nd episode of vaginal bleeding. At that time pt with 200cc clotted blood and pooling of bright red blood in vagina on exam.  Tachycardia to 110-130s with BP stable 125/75. Given continued vaginal bleeding pt then examined with GYN attending Norma Arciniega and Raj Luciano at bedside and additional 300cc bright red blood noted. IR consulted in setting of continued vaginal bleeding. Zhegn catheter and cervical balloon placed at bedside, 2uPRBCs started, 2nd line placed. At that time patient with hypotensive to 60s/30s in setting of acute bleeding. While awaiting for IR to call back, decision made to move pt to trauma room as vaginal bleeding temporized with cervical balloon.     In trauma room while awaiting for IR to call back, patient became slow to respond and difficult to arouse while 2nd uPRBCs running. Pt HR 60-80, BP 90s/40-50s. Vaginal exam revealed cervical balloon in place with minimal drainage. Coco Moon and resident Sherly at bedside. With 3rd uPRBCs pt clinically improved, alert and responsive, BPs 100-110s/50-70s.     After discussion with IR attending plan for embolization and possible D&C/hysterectomy if continued vaginal bleeding.     OBHx:  FT  7#5oz,  FT  7#1oz,  FT  6#1oz, TOP x1 (, )  GYNHx: denies (2019 23:29)      Surgery Information  OR time:      EBL:          IV Fluids:       Blood Products:   UOP:          PAST MEDICAL & SURGICAL HISTORY:  Ectopic pregnancy: cervical  Anxiety  Depression  H/O shoulder surgery: 19    Home Meds: Home Medications:    Allergies: Allergies    No Known Drug Allergies  peanuts (Anaphylaxis)  Sesame (Unknown)    Intolerances      Soc:   Advanced Directives: Presumed Full Code     ROS:    REVIEW OF SYSTEMS    [ ] A ten-point review of systems was otherwise negative except as noted.  [ ] Due to altered mental status/intubation, subjective information were not able to be obtained from the patient. History was obtained, to the extent possible, from review of the chart and collateral sources of information.      CURRENT MEDICATIONS:   --------------------------------------------------------------------------------------  Neurologic Medications  fentaNYL    Injectable 25 MICROGram(s) IV Push every 5 minutes PRN Moderate Pain (4 - 6)  HYDROmorphone PCA (1 mG/mL) 30 milliLiter(s) PCA Continuous PCA Continuous  HYDROmorphone PCA (1 mG/mL) Rescue Clinician Bolus 0.5 milliGRAM(s) IV Push every 15 minutes PRN for Pain Scale GREATER THAN 6  ketorolac   Injectable 15 milliGRAM(s) IV Push every 6 hours  metoclopramide Injectable 10 milliGRAM(s) IV Push once PRN Nausea and/or Vomiting  nalbuphine Injectable 5 milliGRAM(s) IV Push every 6 hours PRN Pruritus  ondansetron Injectable 4 milliGRAM(s) IV Push every 6 hours PRN Nausea  ondansetron Injectable 4 milliGRAM(s) IV Push once PRN Nausea and/or Vomiting    Respiratory Medications    Cardiovascular Medications    Gastrointestinal Medications  lactated ringers. 1000 milliLiter(s) IV Continuous <Continuous>    Genitourinary Medications    Hematologic/Oncologic Medications    Antimicrobial/Immunologic Medications  ciprofloxacin   IVPB 400 milliGRAM(s) IV Intermittent every 12 hours    Endocrine/Metabolic Medications    Topical/Other Medications  naloxone Injectable 0.1 milliGRAM(s) IV Push every 3 minutes PRN For ANY of the following changes in patient status:  A. RR LESS THAN 10 breaths per minute, B. Oxygen saturation LESS THAN 90%, C. Sedation score of 6    --------------------------------------------------------------------------------------    VITAL SIGNS, INS/OUTS (last 24 hours):  --------------------------------------------------------------------------------------  ICU Vital Signs Last 24 Hrs  T(C): 36.9 (2019 00:45), Max: 37.1 (2019 22:11)  T(F): 98.5 (2019 00:45), Max: 98.7 (2019 22:11)  HR: 82 (2019 00:45) (76 - 124)  BP: 120/50 (2019 00:45) (102/71 - 125/75)  BP(mean): --  ABP: --  ABP(mean): --  RR: 19 (2019 00:45) (16 - 19)  SpO2: 99% (2019 00:45) (98% - 100%)    I&O's Summary    --------------------------------------------------------------------------------------    EXAM:  General/Neuro  RASS:   GCS:   Exam: Normal, NAD, alert, oriented x 3, no focal deficits. PERRLA  ***    Respiratory  Exam: Lungs clear to auscultation, Normal expansion/effort.  ***  [] Tracheostomy   [] Intubated  Mechanical Ventilation:     Cardiovascular  Exam: S1, S2.  Regular rate and rhythm.  Peripheral edema  ***  Cardiac Rhythm: Normal Sinus Rhythm  ECHO:     GI  Exam: Abdomen soft, Non-tender, Non-distended.  Gastrostomy / Jejunostomy tube in place.  Nasogastric tube in place.  Colostomy / Ileostomy.  ***  Wound:   ***  Current Diet:  NPO***      Tubes/Lines/Drains  ***  [x] Peripheral IV  [] Central Venous Line     	[] R	[] L	[] IJ	[] Fem	[] SC        Type:	    Date Placed:   [] Arterial Line		[] R	[] L	[] Fem	[] Rad	[] Ax	Date Placed:   [] PICC:         	[] Midline		[] Mediport           [] Urinary Catheter		Date Placed:     Extremities  Exam: Extremities warm, pink, well-perfused.        Derm:  Exam: Good skin turgor, no skin breakdown.      :   Exam: Zheng catheter in place.     LABS  --------------------------------------------------------------------------------------  Labs:  CAPILLARY BLOOD GLUCOSE                              11.6   27.32 )-----------( 263      ( 2019 00:02 )             35.7       Auto Immature Granulocyte %: 0.3 % (19 @ 21:37)  Auto Neutrophil %: 66.2 % (19 @ 21:37)  Auto Immature Granulocyte %: 0.3 % (19 @ 18:55)  Auto Neutrophil %: 66.6 % (19 @ 18:55)        137  |  106  |  11  ----------------------------<  141<H>  4.7   |  20<L>  |  0.90      eGFR if Non : 83 mL/min (19 @ 23:58)      LFTs:             7.0  | 0.3  | 26       ------------------[50      ( 2019 18:55 )  3.8  | x    | 32          Lipase:x      Amylase:x             Coags:     12.7   ----< 1.14    ( 2019 23:58 )     24.6                      --------------------------------------------------------------------------------------    OTHER LABS    IMAGING RESULTS      ASSESSMENT:  34y Female ***    PLAN:   Neurologic:   Respiratory:   Cardiovascular:   Gastrointestinal/Nutrition:   Renal/Genitourinary:   Hematologic:   Infectious Disease:   Lines/Tubes:  Endocrine:   Disposition:     --------------------------------------------------------------------------------------    Critical Care Diagnoses: SICU Consultation Note  =====================================================    HPI: 34y Female    35 y/o  status post IM and intra-sac MTX on 3/1 for cervical ectopic pregnancy presents to ED with c/o abnormal vaginal bleeding x 4wks. Pt previously admitted  with complaint of intermittent vaginal bleeding that began on  filling <2ppd. During prior admission she was rescanned by ATU and no evidence of pregnancy was seen so she was discharged with plan to follow up in clinic as her bleeding was stable at that time. Patient's bHCG has been down trending from 116072(3/1)->48(5/15)->15().    Toosvaldo patient admits to vaginal bleeding 4ppd for 2-3wks with acute onset of heavy vaginal bleeding today since 9am. Pt has been filling 1-2pads per hour since 9 am and passing multiple large clots. Per pt her bleeding comes in episodes associated with cramping, passage of clots, and dizziness. At home she felt like she was going to pass out and so she came to ED. Admits to dizziness/lightheadedness, nausea, chills and cramping pelvic pain. Since being in the ED she passed multiple large clots with noted improvement in abdominal cramping. While at sitting or laying down pt denies dizziness/lightheadedness, feeling faint, CP/SOB, palpitations, nausea upon initial exam at 7pm. Upon initial exam, 200cc clotted blood noted at bedside with slow bleeding from cervix. Pt placed on monitor while at bedside for continuous monitoring with HR 90s and BP stable 110-120/60s.        In trauma room while awaiting for IR to call back, patient became slow to respond and difficult to arouse while 2nd uPRBCs running. Pt HR 60-80, BP 90s/40-50s. Vaginal exam revealed cervical balloon in place with minimal drainage. Coco Moon and resident Sherly at bedside. With 3rd uPRBCs pt clinically improved, alert and responsive, BPs 100-110s/50-70s.         OBHx: 2009 FT  7#5oz,  FT  7#1oz,  FT  6#1oz, TOP x1 (, )  GYNHx: renate (2019 23:29)      Surgery Information  OR time:      EBL:          IV Fluids:       Blood Products:   UOP:          PAST MEDICAL & SURGICAL HISTORY:  Ectopic pregnancy: cervical  Anxiety  Depression  H/O shoulder surgery: 18    Home Meds: Home Medications:    Allergies: Allergies    No Known Drug Allergies  peanuts (Anaphylaxis)  Sesame (Unknown)    Intolerances      Soc:   Advanced Directives: Presumed Full Code     ROS:    REVIEW OF SYSTEMS    [ ] A ten-point review of systems was otherwise negative except as noted.  [ ] Due to altered mental status/intubation, subjective information were not able to be obtained from the patient. History was obtained, to the extent possible, from review of the chart and collateral sources of information.      CURRENT MEDICATIONS:   --------------------------------------------------------------------------------------  Neurologic Medications  fentaNYL    Injectable 25 MICROGram(s) IV Push every 5 minutes PRN Moderate Pain (4 - 6)  HYDROmorphone PCA (1 mG/mL) 30 milliLiter(s) PCA Continuous PCA Continuous  HYDROmorphone PCA (1 mG/mL) Rescue Clinician Bolus 0.5 milliGRAM(s) IV Push every 15 minutes PRN for Pain Scale GREATER THAN 6  ketorolac   Injectable 15 milliGRAM(s) IV Push every 6 hours  metoclopramide Injectable 10 milliGRAM(s) IV Push once PRN Nausea and/or Vomiting  nalbuphine Injectable 5 milliGRAM(s) IV Push every 6 hours PRN Pruritus  ondansetron Injectable 4 milliGRAM(s) IV Push every 6 hours PRN Nausea  ondansetron Injectable 4 milliGRAM(s) IV Push once PRN Nausea and/or Vomiting    Respiratory Medications    Cardiovascular Medications    Gastrointestinal Medications  lactated ringers. 1000 milliLiter(s) IV Continuous <Continuous>    Genitourinary Medications    Hematologic/Oncologic Medications    Antimicrobial/Immunologic Medications  ciprofloxacin   IVPB 400 milliGRAM(s) IV Intermittent every 12 hours    Endocrine/Metabolic Medications    Topical/Other Medications  naloxone Injectable 0.1 milliGRAM(s) IV Push every 3 minutes PRN For ANY of the following changes in patient status:  A. RR LESS THAN 10 breaths per minute, B. Oxygen saturation LESS THAN 90%, C. Sedation score of 6    --------------------------------------------------------------------------------------    VITAL SIGNS, INS/OUTS (last 24 hours):  --------------------------------------------------------------------------------------  ICU Vital Signs Last 24 Hrs  T(C): 36.9 (2019 00:45), Max: 37.1 (2019 22:11)  T(F): 98.5 (2019 00:45), Max: 98.7 (2019 22:11)  HR: 82 (2019 00:45) (76 - 124)  BP: 120/50 (2019 00:45) (102/71 - 125/75)  BP(mean): --  ABP: --  ABP(mean): --  RR: 19 (2019 00:45) (16 - 19)  SpO2: 99% (2019 00:45) (98% - 100%)    I&O's Summary    --------------------------------------------------------------------------------------    EXAM:  General/Neuro  RASS:   GCS:   Exam: Normal, NAD, alert, oriented x 3, no focal deficits. PERRLA  ***    Respiratory  Exam: Lungs clear to auscultation, Normal expansion/effort.  ***  [] Tracheostomy   [] Intubated  Mechanical Ventilation:     Cardiovascular  Exam: S1, S2.  Regular rate and rhythm.  Peripheral edema  ***  Cardiac Rhythm: Normal Sinus Rhythm  ECHO:     GI  Exam: Abdomen soft, Non-tender, Non-distended.  Gastrostomy / Jejunostomy tube in place.  Nasogastric tube in place.  Colostomy / Ileostomy.  ***  Wound:   ***  Current Diet:  NPO***      Tubes/Lines/Drains  ***  [x] Peripheral IV  [] Central Venous Line     	[] R	[] L	[] IJ	[] Fem	[] SC        Type:	    Date Placed:   [] Arterial Line		[] R	[] L	[] Fem	[] Rad	[] Ax	Date Placed:   [] PICC:         	[] Midline		[] Mediport           [] Urinary Catheter		Date Placed:     Extremities  Exam: Extremities warm, pink, well-perfused.        Derm:  Exam: Good skin turgor, no skin breakdown.      :   Exam: Zheng catheter in place.     LABS  --------------------------------------------------------------------------------------  Labs:  CAPILLARY BLOOD GLUCOSE                              11.6   27.32 )-----------( 263      ( 2019 00:02 )             35.7       Auto Immature Granulocyte %: 0.3 % (19 @ 21:37)  Auto Neutrophil %: 66.2 % (19 @ 21:37)  Auto Immature Granulocyte %: 0.3 % (19 @ 18:55)  Auto Neutrophil %: 66.6 % (19 @ 18:55)        137  |  106  |  11  ----------------------------<  141<H>  4.7   |  20<L>  |  0.90      eGFR if Non : 83 mL/min (19 @ 23:58)      LFTs:             7.0  | 0.3  | 26       ------------------[50      ( 2019 18:55 )  3.8  | x    | 32          Lipase:x      Amylase:x             Coags:     12.7   ----< 1.14    ( 2019 23:58 )     24.6                      --------------------------------------------------------------------------------------    OTHER LABS    IMAGING RESULTS      ASSESSMENT:  34y Female ***    PLAN:   Neurologic:   Respiratory:   Cardiovascular:   Gastrointestinal/Nutrition:   Renal/Genitourinary:   Hematologic:   Infectious Disease:   Lines/Tubes:  Endocrine:   Disposition:     --------------------------------------------------------------------------------------    Critical Care Diagnoses: SICU Consultation Note  =====================================================    HPI:     35 y/o  status post IM and intra-sac MTX on 3/1 for cervical ectopic pregnancy presents to ED with c/o abnormal vaginal bleeding x 4wks. Pt previously admitted  with complaint of intermittent vaginal bleeding that began on  filling <2ppd. During prior admission she was rescanned by ATU and no evidence of pregnancy was seen so she was discharged with plan to follow up in clinic as her bleeding was stable at that time. Patient's bHCG has been down trending from 463646(3/1)->48(5/15)->15().    Yemi patient admits to vaginal bleeding 4ppd for 2-3wks with acute onset of heavy vaginal bleeding today since 9am. Pt has been filling 1-2pads per hour since 9 am and passing multiple large clots. Per pt her bleeding comes in episodes associated with cramping, passage of clots, and dizziness. At home she felt like she was going to pass out and so she came to ED.     In ED, patient initially passed 200cc of clots followed by a second episode of 300cc. As per OBGYN documentation, experienced episode of hypotension to 60s/30s for which she immediately received 2 unit pRBC. Cervical balloon placed by OB with hemostasis achieved; however, patient experienced third episode of bleeding (200cc) and received a 3rd unit pRBC. TVUS did not demonstrate any retained products. Patient underwent bilateral UAE embolization with IR and transferred to PACU. Patient to received 1 unit FFP. Current vitals: afebrile, P: 82, /50; saturating at 99%    Patient seen and examined.       OBHx:  FT  7#5oz,  FT  7#1oz,  FT  6#1oz, TOP x1 (, )  GYNHx: denies (2019 23:29)      Surgery Information  OR time:      EBL:          IV Fluids:       Blood Products:   UOP:          PAST MEDICAL & SURGICAL HISTORY:  Ectopic pregnancy: cervical  Anxiety  Depression  H/O shoulder surgery: 19    Home Meds: Home Medications:    Allergies: Allergies    No Known Drug Allergies  peanuts (Anaphylaxis)  Sesame (Unknown)    Intolerances      Soc:   Advanced Directives: Presumed Full Code     ROS:    REVIEW OF SYSTEMS    [x] A ten-point review of systems was otherwise negative except as noted.        CURRENT MEDICATIONS:   --------------------------------------------------------------------------------------  Neurologic Medications  fentaNYL    Injectable 25 MICROGram(s) IV Push every 5 minutes PRN Moderate Pain (4 - 6)  HYDROmorphone PCA (1 mG/mL) 30 milliLiter(s) PCA Continuous PCA Continuous  HYDROmorphone PCA (1 mG/mL) Rescue Clinician Bolus 0.5 milliGRAM(s) IV Push every 15 minutes PRN for Pain Scale GREATER THAN 6  ketorolac   Injectable 15 milliGRAM(s) IV Push every 6 hours  metoclopramide Injectable 10 milliGRAM(s) IV Push once PRN Nausea and/or Vomiting  nalbuphine Injectable 5 milliGRAM(s) IV Push every 6 hours PRN Pruritus  ondansetron Injectable 4 milliGRAM(s) IV Push every 6 hours PRN Nausea  ondansetron Injectable 4 milliGRAM(s) IV Push once PRN Nausea and/or Vomiting    Gastrointestinal Medications  lactated ringers. 1000 milliLiter(s) IV Continuous <Continuous>      Antimicrobial/Immunologic Medications  ciprofloxacin   IVPB 400 milliGRAM(s) IV Intermittent every 12 hours  Topical/Other Medications  naloxone Injectable 0.1 milliGRAM(s) IV Push every 3 minutes PRN For ANY of the following changes in patient status:  A. RR LESS THAN 10 breaths per minute, B. Oxygen saturation LESS THAN 90%, C. Sedation score of 6    --------------------------------------------------------------------------------------    VITAL SIGNS, INS/OUTS (last 24 hours):  --------------------------------------------------------------------------------------  ICU Vital Signs Last 24 Hrs  T(C): 36.9 (2019 00:45), Max: 37.1 (2019 22:11)  T(F): 98.5 (2019 00:45), Max: 98.7 (2019 22:11)  HR: 82 (2019 00:45) (76 - 124)  BP: 120/50 (2019 00:45) (102/71 - 125/75)  BP(mean): --  ABP: --  ABP(mean): --  RR: 19 (2019 00:45) (16 - 19)  SpO2: 99% (2019 00:45) (98% - 100%)    I&O's Summary    --------------------------------------------------------------------------------------    EXAM:  General/Neuro  Exam: Normal, NAD, alert, oriented x 3, no focal deficits. PERRLA     Respiratory  Exam: Lungs clear to auscultation, Normal expansion/effort.      Cardiovascular  Exam: S1, S2.  Regular rate and rhythm. No Peripheral edema    Cardiac Rhythm: Normal Sinus Rhythm    GI  Exam: Abdomen soft,   Current Diet:  NPO      Tubes/Lines/Drains  ***  [x] Peripheral IV  [] Central Venous Line     	[] R	[] L	[] IJ	[] Fem	[] SC        Type:	    Date Placed:   [] Arterial Line		[] R	[] L	[] Fem	[] Rad	[] Ax	Date Placed:   [] PICC:         	[] Midline		[] Mediport           [] Urinary Catheter		Date Placed:     Extremities  Exam: Extremities warm, pink, well-perfused.        Derm:  Exam: Good skin turgor, no skin breakdown.      :   Exam: Zheng catheter in place.     LABS  --------------------------------------------------------------------------------------  Labs:  CAPILLARY BLOOD GLUCOSE                              11.6   27.32 )-----------( 263      ( 2019 00:02 )             35.7       Auto Immature Granulocyte %: 0.3 % (19 @ 21:37)  Auto Neutrophil %: 66.2 % (19 @ 21:37)  Auto Immature Granulocyte %: 0.3 % (19 @ 18:55)  Auto Neutrophil %: 66.6 % (19 @ 18:55)        137  |  106  |  11  ----------------------------<  141<H>  4.7   |  20<L>  |  0.90      eGFR if Non : 83 mL/min (19 @ 23:58)      LFTs:             7.0  | 0.3  | 26       ------------------[50      ( 2019 18:55 )  3.8  | x    | 32          Lipase:x      Amylase:x             Coags:     12.7   ----< 1.14    ( 2019 23:58 )     24.6      --------------------------------------------------------------------------------------    OTHER LABS    IMAGING RESULTS      IMPRESSION:    Small echogenic focus containing fluid with thick walls demonstrating   pulsation from maternal heartbeat. This is of unknown etiology but may be   the fluid seen on prior exam which has now organized. There is no flow to   suggest retained products. Repeat beta hCG levels and close interval   follow-up with ultrasound recommended.    The findings were discussed with Dr. Newman at time of final signing.       -    ASSESSMENT:  35 y/o  status post IM and intra-sac MTX on 3/1 for cervical ectopic pregnancy presenting with vaginal bleeding s/p cervical balloon insertion & b/l UAE. Remains hemodynamically stable.     PLAN:   Neurologic:   Respiratory:   Cardiovascular:   Gastrointestinal/Nutrition:   Renal/Genitourinary:   Hematologic:   Infectious Disease:   Lines/Tubes:  Endocrine:   Disposition:     Nicholas Victoria D.O.  PGY-2 EM/IM  Pager #[phone]  [phone]     --------------------------------------------------------------------------------------    Critical Care Diagnoses: Acute blood loss anemia; active hemorrhage; ectopic cervical pregnancy. SICU Consultation Note  =====================================================    HPI:     35 y/o  status post IM and intra-sac MTX on 3/1 for cervical ectopic pregnancy presents to ED with c/o abnormal vaginal bleeding x 4wks. Pt previously admitted  with complaint of intermittent vaginal bleeding that began on  filling <2ppd. During prior admission she was rescanned by ATU and no evidence of pregnancy was seen so she was discharged with plan to follow up in clinic as her bleeding was stable at that time. Patient's bHCG has been down trending from 439128(3/1)->48(5/15)->15().    Yemi patient admits to vaginal bleeding 4ppd for 2-3wks with acute onset of heavy vaginal bleeding today since 9am. Pt has been filling 1-2pads per hour since 9 am and passing multiple large clots. Per pt her bleeding comes in episodes associated with cramping, passage of clots, and dizziness. At home she felt like she was going to pass out and so she came to ED.     In ED, patient initially passed 200cc of clots followed by a second episode of 300cc. As per OBGYN documentation, experienced episode of hypotension to 60s/30s for which she immediately received 2 unit pRBC. Cervical balloon placed by OB with hemostasis achieved; however, patient experienced third episode of bleeding (200cc) and received a 3rd unit pRBC. TVUS did not demonstrate any retained products. Patient underwent bilateral UAE embolization with IR and transferred to PACU. Patient to received 1 unit FFP. Current vitals: afebrile, P: 82, /50; saturating at 99%    Patient seen and examined; appears somewhat lethargic. Notes some mild suprapubic crampy pain but otherwise denies chest pain, difficulty breathing, nausea or vomiting.       OBHx:  FT  7#5oz,  FT  7#1oz,  FT  6#1oz, TOP x1 (, )  GYNHx: denies (2019 23:29)        PAST MEDICAL & SURGICAL HISTORY:  Ectopic pregnancy: cervical  Anxiety  Depression  H/O shoulder surgery: 19    Home Meds: Home Medications:    Allergies: Allergies    No Known Drug Allergies  peanuts (Anaphylaxis)  Sesame (Unknown)    Intolerances      Soc:   Advanced Directives: Presumed Full Code     ROS:    REVIEW OF SYSTEMS    [x] A ten-point review of systems was otherwise negative except as noted.        CURRENT MEDICATIONS:   --------------------------------------------------------------------------------------  Neurologic Medications  fentaNYL    Injectable 25 MICROGram(s) IV Push every 5 minutes PRN Moderate Pain (4 - 6)  HYDROmorphone PCA (1 mG/mL) 30 milliLiter(s) PCA Continuous PCA Continuous  HYDROmorphone PCA (1 mG/mL) Rescue Clinician Bolus 0.5 milliGRAM(s) IV Push every 15 minutes PRN for Pain Scale GREATER THAN 6  ketorolac   Injectable 15 milliGRAM(s) IV Push every 6 hours  metoclopramide Injectable 10 milliGRAM(s) IV Push once PRN Nausea and/or Vomiting  nalbuphine Injectable 5 milliGRAM(s) IV Push every 6 hours PRN Pruritus  ondansetron Injectable 4 milliGRAM(s) IV Push every 6 hours PRN Nausea  ondansetron Injectable 4 milliGRAM(s) IV Push once PRN Nausea and/or Vomiting    Gastrointestinal Medications  lactated ringers. 1000 milliLiter(s) IV Continuous <Continuous>      Antimicrobial/Immunologic Medications  ciprofloxacin   IVPB 400 milliGRAM(s) IV Intermittent every 12 hours  Topical/Other Medications  naloxone Injectable 0.1 milliGRAM(s) IV Push every 3 minutes PRN For ANY of the following changes in patient status:  A. RR LESS THAN 10 breaths per minute, B. Oxygen saturation LESS THAN 90%, C. Sedation score of 6    --------------------------------------------------------------------------------------    VITAL SIGNS, INS/OUTS (last 24 hours):  --------------------------------------------------------------------------------------  ICU Vital Signs Last 24 Hrs  T(C): 36.9 (2019 00:45), Max: 37.1 (2019 22:11)  T(F): 98.5 (2019 00:45), Max: 98.7 (2019 22:11)  HR: 82 (2019 00:45) (76 - 124)  BP: 120/50 (2019 00:45) (102/71 - 125/75)  BP(mean): --  ABP: --  ABP(mean): --  RR: 19 (2019 00:45) (16 - 19)  SpO2: 99% (2019 00:45) (98% - 100%)    I&O's Summary    --------------------------------------------------------------------------------------    EXAM:  General/Neuro  Exam: Lethargic; no focal deficits. PERRLA     Respiratory  Exam: Lungs clear to auscultation, Normal expansion/effort.      Cardiovascular  Exam: S1, S2.  Regular rate and rhythm. No Peripheral edema    Cardiac Rhythm: Normal Sinus Rhythm    GI  Exam: Obese; Abdomen soft, non-tender, non-distended. +BS  Current Diet:  NPO      Tubes/Lines/Drains   [x] Peripheral IV  [] Central Venous Line     	[] R	[] L	[] IJ	[] Fem	[] SC        Type:	    Date Placed:   [] Arterial Line		[] R	[] L	[] Fem	[] Rad	[] Ax	Date Placed:   [] PICC:         	[] Midline		[] Mediport           [x] Urinary Catheter		Date Placed:   [x]  Cervical Balloon     Extremities  Exam: Extremities warm, pink, well-perfused.        Derm:  Exam: Good skin turgor, no skin breakdown.      :   Exam: Zheng catheter in place.     LABS  --------------------------------------------------------------------------------------  Labs:  CAPILLARY BLOOD GLUCOSE                              11.6   27.32 )-----------( 263      ( 2019 00:02 )             35.7       Auto Immature Granulocyte %: 0.3 % (19 @ 21:37)  Auto Neutrophil %: 66.2 % (19 @ 21:37)  Auto Immature Granulocyte %: 0.3 % (19 @ 18:55)  Auto Neutrophil %: 66.6 % (19 @ 18:55)        137  |  106  |  11  ----------------------------<  141<H>  4.7   |  20<L>  |  0.90      eGFR if Non : 83 mL/min (19 @ 23:58)      LFTs:             7.0  | 0.3  | 26       ------------------[50      ( 2019 18:55 )  3.8  | x    | 32          Lipase:x      Amylase:x             Coags:     12.7   ----< 1.14    ( 2019 23:58 )     24.6      --------------------------------------------------------------------------------------    OTHER LABS    IMAGING RESULTS      IMPRESSION:    Small echogenic focus containing fluid with thick walls demonstrating   pulsation from maternal heartbeat. This is of unknown etiology but may be   the fluid seen on prior exam which has now organized. There is no flow to   suggest retained products. Repeat beta hCG levels and close interval   follow-up with ultrasound recommended.    The findings were discussed with Dr. Newman at time of final signing.       -    ASSESSMENT:  35 y/o  status post IM and intra-sac MTX on 3/1 for cervical ectopic pregnancy presenting with vaginal bleeding s/p cervical balloon insertion & b/l UAE. Remains hemodynamically stable.     PLAN:     Neurologic:   -Pain control with PCA    Respiratory:   -Saturating appropriately on room air    Cardiovascular:   -Hemodynamically stable; monitor vitals closely    Gastrointestinal/Nutrition:   -NPO   -IVF    Renal/Genitourinary:   -Monitor urine output; strict I/Os  -Zheng     Hematologic:   -Trend CBC    Infectious Disease:   Lines/Tubes:  Endocrine:   Disposition:     Nicholas Victoria D.O.  PGY-2 EM/IM  Pager #[phone]  [phone]     --------------------------------------------------------------------------------------    Critical Care Diagnoses: Acute blood loss anemia; active hemorrhage; ectopic cervical pregnancy. SICU Consultation Note  =====================================================    HPI:     35 y/o  status post IM and intra-sac MTX on 3/1 for cervical ectopic pregnancy presents to ED with c/o abnormal vaginal bleeding x 4wks. Pt previously admitted  with complaint of intermittent vaginal bleeding that began on  filling <2ppd. During prior admission she was rescanned by ATU and no evidence of pregnancy was seen so she was discharged with plan to follow up in clinic as her bleeding was stable at that time. Patient's bHCG has been down trending from 120385(3/1)->48(5/15)->15().    Yemi patient admits to vaginal bleeding 4ppd for 2-3wks with acute onset of heavy vaginal bleeding today since 9am. Pt has been filling 1-2pads per hour since 9 am and passing multiple large clots. Per pt her bleeding comes in episodes associated with cramping, passage of clots, and dizziness. At home she felt like she was going to pass out and so she came to ED.     In ED, patient initially passed 200cc of clots followed by a second episode of 300cc. As per OBGYN documentation, experienced episode of hypotension to 60s/30s for which she immediately received 2 unit pRBC. Cervical balloon placed by OB with hemostasis achieved; however, patient experienced third episode of bleeding (200cc) and received a 3rd unit pRBC. TVUS did not demonstrate any retained products. Patient underwent bilateral UAE embolization with IR and transferred to PACU. Patient to received 1 unit FFP. Current vitals: afebrile, P: 82, /50; saturating at 99%    Patient seen and examined; appears somewhat lethargic. Notes some mild suprapubic crampy pain but otherwise denies chest pain, difficulty breathing, nausea or vomiting.       OBHx:  FT  7#5oz,  FT  7#1oz,  FT  6#1oz, TOP x1 (, )  GYNHx: denies (2019 23:29)        PAST MEDICAL & SURGICAL HISTORY:  Ectopic pregnancy: cervical  Anxiety  Depression  H/O shoulder surgery: 19    Home Meds: Home Medications:    Allergies: Allergies    No Known Drug Allergies  peanuts (Anaphylaxis)  Sesame (Unknown)    Intolerances      Soc:   Advanced Directives: Presumed Full Code     ROS:    REVIEW OF SYSTEMS    [x] A ten-point review of systems was otherwise negative except as noted.        CURRENT MEDICATIONS:   --------------------------------------------------------------------------------------  Neurologic Medications  fentaNYL    Injectable 25 MICROGram(s) IV Push every 5 minutes PRN Moderate Pain (4 - 6)  HYDROmorphone PCA (1 mG/mL) 30 milliLiter(s) PCA Continuous PCA Continuous  HYDROmorphone PCA (1 mG/mL) Rescue Clinician Bolus 0.5 milliGRAM(s) IV Push every 15 minutes PRN for Pain Scale GREATER THAN 6  ketorolac   Injectable 15 milliGRAM(s) IV Push every 6 hours  metoclopramide Injectable 10 milliGRAM(s) IV Push once PRN Nausea and/or Vomiting  nalbuphine Injectable 5 milliGRAM(s) IV Push every 6 hours PRN Pruritus  ondansetron Injectable 4 milliGRAM(s) IV Push every 6 hours PRN Nausea  ondansetron Injectable 4 milliGRAM(s) IV Push once PRN Nausea and/or Vomiting    Gastrointestinal Medications  lactated ringers. 1000 milliLiter(s) IV Continuous <Continuous>      Antimicrobial/Immunologic Medications  ciprofloxacin   IVPB 400 milliGRAM(s) IV Intermittent every 12 hours  Topical/Other Medications  naloxone Injectable 0.1 milliGRAM(s) IV Push every 3 minutes PRN For ANY of the following changes in patient status:  A. RR LESS THAN 10 breaths per minute, B. Oxygen saturation LESS THAN 90%, C. Sedation score of 6    --------------------------------------------------------------------------------------    VITAL SIGNS, INS/OUTS (last 24 hours):  --------------------------------------------------------------------------------------  ICU Vital Signs Last 24 Hrs  T(C): 36.9 (2019 00:45), Max: 37.1 (2019 22:11)  T(F): 98.5 (2019 00:45), Max: 98.7 (2019 22:11)  HR: 82 (2019 00:45) (76 - 124)  BP: 120/50 (2019 00:45) (102/71 - 125/75)  BP(mean): --  ABP: --  ABP(mean): --  RR: 19 (2019 00:45) (16 - 19)  SpO2: 99% (2019 00:45) (98% - 100%)    I&O's Summary    --------------------------------------------------------------------------------------    EXAM:  General/Neuro  Exam: Lethargic; no focal deficits. PERRLA     Respiratory  Exam: Lungs clear to auscultation, Normal expansion/effort.      Cardiovascular  Exam: S1, S2.  Regular rate and rhythm. No Peripheral edema    Cardiac Rhythm: Normal Sinus Rhythm    GI  Exam: Obese; Abdomen soft, non-tender, non-distended. +BS  Current Diet:  NPO      Tubes/Lines/Drains   [x] Peripheral IV  [] Central Venous Line     	[] R	[] L	[] IJ	[] Fem	[] SC        Type:	    Date Placed:   [] Arterial Line		[] R	[] L	[] Fem	[] Rad	[] Ax	Date Placed:   [] PICC:         	[] Midline		[] Mediport           [x] Urinary Catheter		Date Placed:   [x]  Cervical Balloon     Extremities  Exam: Extremities warm, pink, well-perfused.        Derm:  Exam: Good skin turgor, no skin breakdown.      :   Exam: Zheng catheter in place.     LABS  --------------------------------------------------------------------------------------  Labs:  CAPILLARY BLOOD GLUCOSE                              11.6   27.32 )-----------( 263      ( 2019 00:02 )             35.7       Auto Immature Granulocyte %: 0.3 % (19 @ 21:37)  Auto Neutrophil %: 66.2 % (19 @ 21:37)  Auto Immature Granulocyte %: 0.3 % (19 @ 18:55)  Auto Neutrophil %: 66.6 % (19 @ 18:55)        137  |  106  |  11  ----------------------------<  141<H>  4.7   |  20<L>  |  0.90      eGFR if Non : 83 mL/min (19 @ 23:58)      LFTs:             7.0  | 0.3  | 26       ------------------[50      ( 2019 18:55 )  3.8  | x    | 32          Lipase:x      Amylase:x             Coags:     12.7   ----< 1.14    ( 2019 23:58 )     24.6      --------------------------------------------------------------------------------------    OTHER LABS    IMAGING RESULTS      IMPRESSION:    Small echogenic focus containing fluid with thick walls demonstrating   pulsation from maternal heartbeat. This is of unknown etiology but may be   the fluid seen on prior exam which has now organized. There is no flow to   suggest retained products. Repeat beta hCG levels and close interval   follow-up with ultrasound recommended.    The findings were discussed with Dr. Newman at time of final signing.       -    ASSESSMENT:  35 y/o  status post IM and intra-sac MTX on 3/1 for cervical ectopic pregnancy presenting with vaginal bleeding s/p cervical balloon insertion & b/l UAE. Remains hemodynamically stable.     PLAN:     Neurologic:   -Pain control with PCA    Respiratory:   -Saturating appropriately on room air    Cardiovascular:   -Hemodynamically stable; monitor vitals closely    Gastrointestinal/Nutrition:   -NPO   -IVF    Renal/Genitourinary:   -Monitor urine output; strict I/Os  -Zheng     Hematologic:   -Acute blood loss anemia. s/p 3 unit pRBC & 1 unit FFP.  -Monitor Cervical Balloon output  -Trend CBC q 4 hrs  -Follow up with OBGYN for removal of cervical balloon.     Infectious Disease:   -Ciprofloxacin ppx as per IR    Lines/Tubes:  -PIV  -Zheng  -Cervical Balloon    Endocrine:   -Routine Concerns     Disposition: Admit to SICU    Nicholas Victoria D.O.  PGY-2 EM/IM  Pager #[phone]  [phone]     --------------------------------------------------------------------------------------    Critical Care Diagnoses: Acute blood loss anemia; active hemorrhage; ectopic cervical pregnancy. SICU Consultation Note  =====================================================    HPI:     33 y/o  status post IM and intra-sac MTX on 3/1 for cervical ectopic pregnancy presents to ED with c/o abnormal vaginal bleeding x 4wks. Pt previously admitted  with complaint of intermittent vaginal bleeding that began on  filling <2ppd. During prior admission she was rescanned by ATU and no evidence of pregnancy was seen so she was discharged with plan to follow up in clinic as her bleeding was stable at that time. Patient's bHCG has been down trending from 918695(3/1)->48(5/15)->15().    Yemi patient admits to vaginal bleeding 4ppd for 2-3wks with acute onset of heavy vaginal bleeding today since 9am. Pt has been filling 1-2pads per hour since 9 am and passing multiple large clots. Per pt her bleeding comes in episodes associated with cramping, passage of clots, and dizziness. At home she felt like she was going to pass out and so she came to ED.     In ED, patient initially passed 200cc of clots followed by a second episode of 300cc. As per OBGYN documentation, experienced episode of hypotension to 60s/30s for which she immediately received 2 unit pRBC. Cervical balloon placed by OB with hemostasis achieved; however, patient experienced third episode of bleeding (200cc) and received a 3rd unit pRBC. TVUS did not demonstrate any retained products. Patient underwent bilateral UAE embolization with IR and transferred to PACU. Patient to received 1 unit FFP. Current vitals: afebrile, P: 82, /50; saturating at 99%    Patient seen and examined; appears somewhat lethargic. Notes some mild suprapubic crampy pain but otherwise denies chest pain, difficulty breathing, nausea or vomiting.       OBHx:  FT  7#5oz,  FT  7#1oz,  FT  6#1oz, TOP x1 (, )  GYNHx: denies (2019 23:29)        PAST MEDICAL & SURGICAL HISTORY:  Ectopic pregnancy: cervical  Anxiety  Depression  H/O shoulder surgery: 19    Home Meds: Home Medications:    Allergies: Allergies    No Known Drug Allergies  peanuts (Anaphylaxis)  Sesame (Unknown)    Intolerances      Soc:   Advanced Directives: Presumed Full Code     ROS:    REVIEW OF SYSTEMS    [x] A ten-point review of systems was otherwise negative except as noted.        CURRENT MEDICATIONS:   --------------------------------------------------------------------------------------  Neurologic Medications  fentaNYL    Injectable 25 MICROGram(s) IV Push every 5 minutes PRN Moderate Pain (4 - 6)  HYDROmorphone PCA (1 mG/mL) 30 milliLiter(s) PCA Continuous PCA Continuous  HYDROmorphone PCA (1 mG/mL) Rescue Clinician Bolus 0.5 milliGRAM(s) IV Push every 15 minutes PRN for Pain Scale GREATER THAN 6  ketorolac   Injectable 15 milliGRAM(s) IV Push every 6 hours  metoclopramide Injectable 10 milliGRAM(s) IV Push once PRN Nausea and/or Vomiting  nalbuphine Injectable 5 milliGRAM(s) IV Push every 6 hours PRN Pruritus  ondansetron Injectable 4 milliGRAM(s) IV Push every 6 hours PRN Nausea  ondansetron Injectable 4 milliGRAM(s) IV Push once PRN Nausea and/or Vomiting    Gastrointestinal Medications  lactated ringers. 1000 milliLiter(s) IV Continuous <Continuous>      Antimicrobial/Immunologic Medications  ciprofloxacin   IVPB 400 milliGRAM(s) IV Intermittent every 12 hours  Topical/Other Medications  naloxone Injectable 0.1 milliGRAM(s) IV Push every 3 minutes PRN For ANY of the following changes in patient status:  A. RR LESS THAN 10 breaths per minute, B. Oxygen saturation LESS THAN 90%, C. Sedation score of 6    --------------------------------------------------------------------------------------    VITAL SIGNS, INS/OUTS (last 24 hours):  --------------------------------------------------------------------------------------  ICU Vital Signs Last 24 Hrs  T(C): 36.9 (2019 00:45), Max: 37.1 (2019 22:11)  T(F): 98.5 (2019 00:45), Max: 98.7 (2019 22:11)  HR: 82 (2019 00:45) (76 - 124)  BP: 120/50 (2019 00:45) (102/71 - 125/75)  BP(mean): --  ABP: --  ABP(mean): --  RR: 19 (2019 00:45) (16 - 19)  SpO2: 99% (2019 00:45) (98% - 100%)    I&O's Summary    --------------------------------------------------------------------------------------    EXAM:  General/Neuro  Exam: Lethargic; no focal deficits. PERRLA     Respiratory  Exam: Lungs clear to auscultation, Normal expansion/effort.      Cardiovascular  Exam: S1, S2.  Regular rate and rhythm. No Peripheral edema    Cardiac Rhythm: Normal Sinus Rhythm    GI  Exam: Obese; Abdomen soft, non-tender, non-distended. +BS  Current Diet:  NPO      Tubes/Lines/Drains   [x] Peripheral IV  [] Central Venous Line     	[] R	[] L	[] IJ	[] Fem	[] SC        Type:	    Date Placed:   [] Arterial Line		[] R	[] L	[] Fem	[] Rad	[] Ax	Date Placed:   [] PICC:         	[] Midline		[] Mediport           [x] Urinary Catheter		Date Placed:   [x]  Cervical Balloon     Extremities  Exam: Extremities warm, pink, well-perfused.        Derm:  Exam: Good skin turgor, no skin breakdown.      :   Exam: Zheng catheter in place.     LABS  --------------------------------------------------------------------------------------  Labs:  CAPILLARY BLOOD GLUCOSE                              11.6   27.32 )-----------( 263      ( 2019 00:02 )             35.7       Auto Immature Granulocyte %: 0.3 % (19 @ 21:37)  Auto Neutrophil %: 66.2 % (19 @ 21:37)  Auto Immature Granulocyte %: 0.3 % (19 @ 18:55)  Auto Neutrophil %: 66.6 % (19 @ 18:55)        137  |  106  |  11  ----------------------------<  141<H>  4.7   |  20<L>  |  0.90      eGFR if Non : 83 mL/min (19 @ 23:58)      LFTs:             7.0  | 0.3  | 26       ------------------[50      ( 2019 18:55 )  3.8  | x    | 32          Lipase:x      Amylase:x             Coags:     12.7   ----< 1.14    ( 2019 23:58 )     24.6      --------------------------------------------------------------------------------------    OTHER LABS    IMAGING RESULTS      IMPRESSION:    Small echogenic focus containing fluid with thick walls demonstrating   pulsation from maternal heartbeat. This is of unknown etiology but may be   the fluid seen on prior exam which has now organized. There is no flow to   suggest retained products. Repeat beta hCG levels and close interval   follow-up with ultrasound recommended.    The findings were discussed with Dr. Newman at time of final signing.       -    ASSESSMENT:  33 y/o  status post IM and intra-sac MTX on 3/1 for cervical ectopic pregnancy presenting with vaginal bleeding s/p cervical balloon insertion & b/l UAE. Remains hemodynamically stable.     PLAN:     Neurologic:   -Pain control with PCA    Respiratory:   -Saturating appropriately on room air    Cardiovascular:   -Hemodynamically stable; monitor vitals closely    Gastrointestinal/Nutrition:   -NPO   -IVF    Renal/Genitourinary:   -Monitor urine output; strict I/Os  -Zheng   -Replace electrolytes.     Hematologic:   -Acute blood loss anemia. s/p 3 unit pRBC & 1 unit FFP.  -Monitor Cervical Balloon output  -Trend CBC q 4 hrs  -Follow up with OBGYN for removal of cervical balloon.     Infectious Disease:   -Ciprofloxacin ppx as per IR  -Leukocytosis possibly reactive; monitor fever curve    Lines/Tubes:  -PIV  -Zheng  -Cervical Balloon    Endocrine:   -Routine Concerns     Disposition: Admit to SICU    Nicholas Victoria D.O.  PGY-2 EM/IM  Pager #[phone]  [phone]     --------------------------------------------------------------------------------------    Critical Care Diagnoses: Acute blood loss anemia; active hemorrhage; ectopic cervical pregnancy. SICU Consultation Note  =====================================================    HPI:     35 y/o  status post IM and intra-sac MTX on 3/1 for cervical ectopic pregnancy presents to ED with c/o abnormal vaginal bleeding x 4wks. Pt previously admitted  with complaint of intermittent vaginal bleeding that began on  filling <2ppd. During prior admission she was rescanned by ATU and no evidence of pregnancy was seen so she was discharged with plan to follow up in clinic as her bleeding was stable at that time. Patient's bHCG has been down trending from 010346(3/1)->48(5/15)->15().    Yemi patient admits to vaginal bleeding 4ppd for 2-3wks with acute onset of heavy vaginal bleeding today since 9am. Pt has been filling 1-2pads per hour since 9 am and passing multiple large clots. Per pt her bleeding comes in episodes associated with cramping, passage of clots, and dizziness. At home she felt like she was going to pass out and so she came to ED.     In ED, patient initially passed 200cc of clots followed by a second episode of 300cc. As per OBGYN documentation, experienced episode of hypotension to 60s/30s for which she immediately received 2 unit pRBC. Cervical balloon placed by OB with hemostasis achieved; however, patient experienced third episode of bleeding (200cc) and received a 3rd unit pRBC. TVUS did not demonstrate any retained products. Patient underwent bilateral UAE embolization with IR and transferred to PACU. Patient to received 1 unit FFP. Current vitals: afebrile, P: 82, /50; saturating at 99%    Patient seen and examined; appears somewhat lethargic. Notes some mild suprapubic crampy pain but otherwise denies chest pain, difficulty breathing, nausea or vomiting.       OBHx:  FT  7#5oz,  FT  7#1oz,  FT  6#1oz, TOP x1 (, )  GYNHx: denies (2019 23:29)        PAST MEDICAL & SURGICAL HISTORY:  Ectopic pregnancy: cervical  Anxiety  Depression  H/O shoulder surgery: 19    Home Meds: Home Medications:    Allergies: Allergies    No Known Drug Allergies  peanuts (Anaphylaxis)  Sesame (Unknown)    Intolerances      Soc:   Advanced Directives: Presumed Full Code     ROS:    REVIEW OF SYSTEMS    [x] A ten-point review of systems was otherwise negative except as noted.        CURRENT MEDICATIONS:   --------------------------------------------------------------------------------------  Neurologic Medications  fentaNYL    Injectable 25 MICROGram(s) IV Push every 5 minutes PRN Moderate Pain (4 - 6)  HYDROmorphone PCA (1 mG/mL) 30 milliLiter(s) PCA Continuous PCA Continuous  HYDROmorphone PCA (1 mG/mL) Rescue Clinician Bolus 0.5 milliGRAM(s) IV Push every 15 minutes PRN for Pain Scale GREATER THAN 6  ketorolac   Injectable 15 milliGRAM(s) IV Push every 6 hours  metoclopramide Injectable 10 milliGRAM(s) IV Push once PRN Nausea and/or Vomiting  nalbuphine Injectable 5 milliGRAM(s) IV Push every 6 hours PRN Pruritus  ondansetron Injectable 4 milliGRAM(s) IV Push every 6 hours PRN Nausea  ondansetron Injectable 4 milliGRAM(s) IV Push once PRN Nausea and/or Vomiting    Gastrointestinal Medications  lactated ringers. 1000 milliLiter(s) IV Continuous <Continuous>      Antimicrobial/Immunologic Medications  ciprofloxacin   IVPB 400 milliGRAM(s) IV Intermittent every 12 hours  Topical/Other Medications  naloxone Injectable 0.1 milliGRAM(s) IV Push every 3 minutes PRN For ANY of the following changes in patient status:  A. RR LESS THAN 10 breaths per minute, B. Oxygen saturation LESS THAN 90%, C. Sedation score of 6    --------------------------------------------------------------------------------------    VITAL SIGNS, INS/OUTS (last 24 hours):  --------------------------------------------------------------------------------------  ICU Vital Signs Last 24 Hrs  T(C): 36.9 (2019 00:45), Max: 37.1 (2019 22:11)  T(F): 98.5 (2019 00:45), Max: 98.7 (2019 22:11)  HR: 82 (2019 00:45) (76 - 124)  BP: 120/50 (2019 00:45) (102/71 - 125/75)  BP(mean): --  ABP: --  ABP(mean): --  RR: 19 (2019 00:45) (16 - 19)  SpO2: 99% (2019 00:45) (98% - 100%)    I&O's Summary    --------------------------------------------------------------------------------------    EXAM:  General/Neuro  Exam: Lethargic; no focal deficits. PERRLA     Respiratory  Exam: Lungs clear to auscultation, Normal expansion/effort.      Cardiovascular  Exam: S1, S2.  Regular rate and rhythm. No Peripheral edema    Cardiac Rhythm: Normal Sinus Rhythm    GI  Exam: Obese; Abdomen soft, +suprapubic tenderness to deep palpation; non-distended. +BS  Current Diet:  NPO      Tubes/Lines/Drains   [x] Peripheral IV  [] Central Venous Line     	[] R	[] L	[] IJ	[] Fem	[] SC        Type:	    Date Placed:   [] Arterial Line		[] R	[] L	[] Fem	[] Rad	[] Ax	Date Placed:   [] PICC:         	[] Midline		[] Mediport           [x] Urinary Catheter		Date Placed:   [x]  Cervical Balloon     Extremities  Exam: Extremities warm, pink, well-perfused.  R femoral access site with 2+ palpable pulse.       Derm:  Exam: Good skin turgor, no skin breakdown.      :   Exam: Zheng catheter in place.     LABS  --------------------------------------------------------------------------------------  Labs:  CAPILLARY BLOOD GLUCOSE                              11.6   27.32 )-----------( 263      ( 2019 00:02 )             35.7       Auto Immature Granulocyte %: 0.3 % (19 @ 21:37)  Auto Neutrophil %: 66.2 % (19 @ 21:37)  Auto Immature Granulocyte %: 0.3 % (19 @ 18:55)  Auto Neutrophil %: 66.6 % (19 @ 18:55)        137  |  106  |  11  ----------------------------<  141<H>  4.7   |  20<L>  |  0.90      eGFR if Non : 83 mL/min (19 @ 23:58)      LFTs:             7.0  | 0.3  | 26       ------------------[50      ( 2019 18:55 )  3.8  | x    | 32          Lipase:x      Amylase:x             Coags:     12.7   ----< 1.14    ( 2019 23:58 )     24.6      --------------------------------------------------------------------------------------    OTHER LABS    IMAGING RESULTS      IMPRESSION:    Small echogenic focus containing fluid with thick walls demonstrating   pulsation from maternal heartbeat. This is of unknown etiology but may be   the fluid seen on prior exam which has now organized. There is no flow to   suggest retained products. Repeat beta hCG levels and close interval   follow-up with ultrasound recommended.    The findings were discussed with Dr. Newman at time of final signing.       -    ASSESSMENT:  35 y/o  status post IM and intra-sac MTX on 3/1 for cervical ectopic pregnancy presenting with vaginal bleeding s/p cervical balloon insertion & b/l UAE. Remains hemodynamically stable.     PLAN:     Neurologic:   -Pain control with PCA    Respiratory:   -Saturating appropriately on room air    Cardiovascular:   -Hemodynamically stable; monitor vitals closely    Gastrointestinal/Nutrition:   -NPO   -IVF    Renal/Genitourinary:   -Monitor urine output; strict I/Os  -Zheng   -Replace electrolytes.     Hematologic:   -Acute blood loss anemia. s/p 3 unit pRBC & 1 unit FFP.  -Monitor Cervical Balloon output  -Trend CBC q 4 hrs  -Follow up with OBGYN for removal of cervical balloon.     Infectious Disease:   -Ciprofloxacin ppx as per IR  -Leukocytosis possibly reactive; monitor fever curve    Lines/Tubes:  -PIV  -Zheng  -Cervical Balloon    Endocrine:   -Routine Concerns     Disposition: Admit to SICU    Nicholas Victoria D.O.  PGY-2 EM/IM  Pager #[phone]  [phone]     --------------------------------------------------------------------------------------    Critical Care Diagnoses: Acute blood loss anemia; active hemorrhage; ectopic cervical pregnancy.

## 2019-06-05 NOTE — PROGRESS NOTE ADULT - ASSESSMENT
35 y/o  status post IM and intra-sac MTX on 3/1 for cervical ectopic pregnancy presenting with vaginal bleeding s/p cervical balloon insertion & b/l UAE. Remains hemodynamically stable. Accepted to SICU.

## 2019-06-05 NOTE — ED ADULT NURSE REASSESSMENT NOTE - NS ED NURSE REASSESS COMMENT FT1
Labs collected and sent. Zofran given as per MD order. As per GYN Resident pt going to IR. Vitals as noted. Will continue to monitor closely.

## 2019-06-05 NOTE — PROGRESS NOTE ADULT - SUBJECTIVE AND OBJECTIVE BOX
SICU Afternoon Progress Note  =====================================================  Interval Events: CBC remains stable.  Cervical balloon still in place - to be removed by OB/GYN.      HPI:  Patient is a 34 year old female  S/P IM and intra-sac MTX on 3/1 for cervical ectopic pregnancy who presented to the ED with complaint of abnormal vaginal bleeding x4 weeks.  Patient previously admitted  with complaint of intermittent vaginal bleeding that began on  filling <2 pads per day.  During prior admission she was rescanned by ATU and no evidence of pregnancy was seen so she was discharged with plan to follow up in clinic as her bleeding was stable at that time.  Patient's bHCG has been down trending from [id number](3/1)->48(5/15)->15().      PAST MEDICAL & SURGICAL HISTORY:  Ectopic pregnancy: cervical  Anxiety  Depression  H/O shoulder surgery: 19      ALLERGIES:  No Known Drug Allergies  peanuts (Anaphylaxis)  Sesame (Unknown)    --------------------------------------------------------------------------------------    MEDICATIONS:    Neurologic Medications  acetaminophen  IVPB .. 1000 milliGRAM(s) IV Intermittent once  HYDROmorphone PCA (1 mG/mL) 30 milliLiter(s) PCA Continuous PCA Continuous  HYDROmorphone PCA (1 mG/mL) Rescue Clinician Bolus 0.5 milliGRAM(s) IV Push every 15 minutes PRN for Pain Scale GREATER THAN 6  ketorolac   Injectable 15 milliGRAM(s) IV Push every 6 hours  nalbuphine Injectable 5 milliGRAM(s) IV Push every 6 hours PRN Pruritus  ondansetron Injectable 4 milliGRAM(s) IV Push every 6 hours PRN Nausea    Gastrointestinal Medications  lactated ringers. 1000 milliLiter(s) IV Continuous <Continuous>    Antimicrobial/Immunologic Medications  ciprofloxacin   IVPB 400 milliGRAM(s) IV Intermittent every 12 hours    Topical/Other Medications  chlorhexidine 4% Liquid 1 Application(s) Topical <User Schedule>  lidocaine   Patch 1 Patch Transdermal every 24 hours  naloxone Injectable 0.1 milliGRAM(s) IV Push every 3 minutes PRN For ANY of the following changes in patient status:  A. RR LESS THAN 10 breaths per minute, B. Oxygen saturation LESS THAN 90%, C. Sedation score of 6    --------------------------------------------------------------------------------------    VITAL SIGNS:  T(C): 36.5 (2019 12:00), Max: 37.4 (2019 09:52)  T(F): 97.7 (2019 12:00), Max: 99.3 (2019 09:52)  HR: 80 (2019 15:00) (70 - 124)  BP: 133/67 (2019 14:00) (102/71 - 135/83)  BP(mean): 81 (2019 14:00) (70 - 99)  RR: 19 (2019 15:00) (13 - 26)  SpO2: 98% (2019 15:00) (95% - 100%)    --------------------------------------------------------------------------------------    INS AND OUTS:  2019 07:01  -  2019 07:00  --------------------------------------------------------  IN:    lactated ringers.: 375 mL    Plasma: 300 mL  Total IN: 675 mL    OUT:    Drain: 50 mL    Indwelling Catheter - Urethral: 450 mL  Total OUT: 500 mL    Total NET: 175 mL      2019 07:01  -  2019 16:02  --------------------------------------------------------  IN:    lactated ringers.: 1000 mL  Total IN: 1000 mL    OUT:    Drain: 5 mL    Indwelling Catheter - Urethral: 1175 mL  Total OUT: 1180 mL    Total NET: -180 mL    --------------------------------------------------------------------------------------    EXAM    NEUROLOGY  Exam: Normal, in no acute distress.  Alert and oriented x4.  No focal neurologic deficits.    HEENT  Exam: Normocephalic, atraumatic.    RESPIRATORY  Exam: Lungs clear to auscultation, Normal expansion/effort.    CARDIOVASCULAR  Exam: S1, S2.  Regular rate and rhythm.    GI/NUTRITION  Exam: Abdomen soft, Non-tender, Non-distended.  Current Diet: NPO    VASCULAR  Exam: Extremities warm, pink, well-perfused.    MUSCULOSKELETAL  Exam: All extremities moving spontaneously without limitations.    SKIN  Exam: Good skin turgor, no skin breakdown.    METABOLIC / FLUIDS / ELECTROLYTES  lactated ringers. 1000 milliLiter(s) IV Continuous <Continuous>    HEMATOLOGIC  [x] VTE Prophylaxis:     INFECTIOUS DISEASE  Antimicrobials/Immunologic Medications:  ciprofloxacin   IVPB 400 milliGRAM(s) IV Intermittent every 12 hours      TUBES / LINES / DRAINS  [x] Peripheral IV  [] Central Venous Line     	[] R	[] L	[] IJ	[] Fem	[] SC	Date Placed:   [] Arterial Line		[] R	[] L	[] Fem	[] Rad	[] Ax	Date Placed:   [] PICC		[] Midline		[] Mediport  [] Urinary Catheter		Date Placed:   [x] Necessity of urinary, arterial, and venous catheters discussed    --------------------------------------------------------------------------------------    LABS    CBC:                      11.4   14.62 )-----------( 260      ( 2019 12:02 )             34.4     CHEM:  135  |  104  |  8   ----------------------------<  176<H>  4.0   |  22  |  0.75    Ca    8.0<L>      2019 08:00  Phos  1.9     06-05  Mg     2.3         TPro  7.0  /  Alb  3.8  /  TBili  0.3  /  DBili  x   /  AST  26  /  ALT  32  /  AlkPhos  50  -04    --------------------------------------------------------------------------------------

## 2019-06-05 NOTE — PROGRESS NOTE ADULT - SUBJECTIVE AND OBJECTIVE BOX
POST ANESTHESIA EVALUATION    34y Female POSTOP DAY 1    MENTAL STATUS: Patient participation [x  ] Awake     [  ] Arousable     [  ] Sedated    AIRWAY PATENCY: [  x] Satisfactory  [  ] Other:     Vital Signs Last 24 Hrs  T(C): 36.5 (05 Jun 2019 12:00), Max: 37.4 (05 Jun 2019 09:52)  T(F): 97.7 (05 Jun 2019 12:00), Max: 99.3 (05 Jun 2019 09:52)  HR: 83 (05 Jun 2019 16:00) (70 - 124)  BP: 126/63 (05 Jun 2019 16:00) (102/71 - 135/83)  BP(mean): 76 (05 Jun 2019 16:00) (70 - 99)  RR: 20 (05 Jun 2019 16:00) (13 - 26)  SpO2: 100% (05 Jun 2019 16:00) (95% - 100%)  I&O's Summary    04 Jun 2019 07:01  -  05 Jun 2019 07:00  --------------------------------------------------------  IN: 675 mL / OUT: 500 mL / NET: 175 mL    05 Jun 2019 07:01  -  05 Jun 2019 17:34  --------------------------------------------------------  IN: 1250 mL / OUT: 1505 mL / NET: -255 mL          NAUSEA/ VOMITTING:  [ x ] NONE  [  ] CONTROLLED [  ] OTHER     PAIN: [ x ] CONTROLLED WITH CURRENT REGIMEN  [  ] OTHER    [ x ] NO APPARENT ANESTHESIA COMPLICATIONS      Comments:

## 2019-06-05 NOTE — PROGRESS NOTE ADULT - SUBJECTIVE AND OBJECTIVE BOX
Interventional Radiology Pre-Procedure Note    Procedure: Uterine artery angiography with possible embolization    Diagnosis/Indication: Patient is a 34y old  Female who presents with significant vaginal bleeding. Uterine artery embolization requested.    PAST MEDICAL & SURGICAL HISTORY:  Ectopic pregnancy: cervical  Anxiety  Depression  H/O shoulder surgery: 1/31/19     Allergies: No Known Drug Allergies  peanuts (Anaphylaxis)  Sesame (Unknown)    LABS:  CBC Full  -  ( 05 Jun 2019 00:02 )  WBC Count : 27.32 K/uL  RBC Count : 4.06 M/uL  Hemoglobin : 11.6 g/dL  Hematocrit : 35.7 %  Platelet Count - Automated : 263 K/uL  Mean Cell Volume : 87.9 fL  Mean Cell Hemoglobin : 28.6 pg  Mean Cell Hemoglobin Concentration : 32.5 %    06-04    137  |  106  |  11  ----------------------------<  141<H>  4.7   |  20<L>  |  0.90    Ca    7.5<L>      04 Jun 2019 23:58  Phos  3.1     06-04  Mg     1.5     06-04    TPro  7.0  /  Alb  3.8  /  TBili  0.3  /  DBili  x   /  AST  26  /  ALT  32  /  AlkPhos  50  06-04    PT/INR - ( 04 Jun 2019 23:58 )   PT: 12.7 SEC;   INR: 1.14       PTT - ( 04 Jun 2019 23:58 )  PTT:24.6 SEC    Procedure/ risks/ benefits/ alternatives were discussed with the patient, including but not limited to risks of bleeding and non-target embolization. The patient verbalizes understanding, and witnessed informed consent was obtained.

## 2019-06-05 NOTE — PROGRESS NOTE ADULT - SUBJECTIVE AND OBJECTIVE BOX
Interventional Radiology Brief- Operative Note    Procedure: Pelvic angiography and bilateral uterine artery embolization with Avitene    Operators: DENIS Marks MD, DENIS Laws MD    Anesthesia (type): IV sedation and local    Contrast: 80 mL Omnipaque 350    EBL: < 5 mL    Findings/Follow up Plan of Care: Bilateral uterine artery angiography, no focal bleeding identified. Successful embolization of bilateral UA's with Avitene slurry.     Specimens Removed: none    Implants: none    Complications: none    Condition/Disposition: stable/PACU    Please call Interventional Radiology with any questions, concerns, or issues.      Official report to follow.

## 2019-06-05 NOTE — PROGRESS NOTE ADULT - PROBLEM SELECTOR PLAN 1
Neuro: PCA, Toradol for pain control.  CV: Hemodynamically stable. Hct stable, trending until 8a   Pulm: Saturating well on room air. Encourage incentive spirometry  GI: NPO  : UOP adequate, continue to monitor. Cervical berry with 50cc output total, 20cc since procedure, continue to monitor output. C/w regular pad checks  Heme: SCDs for DVT ppx. laying flat on back w/ no bending of R leg for 4h post IR procedure (until 8a)  Dispo: currently admitted to SICU; appreciate care    NICOLLE Castro PGY1

## 2019-06-05 NOTE — PROGRESS NOTE ADULT - SUBJECTIVE AND OBJECTIVE BOX
Anesthesia Pain Management Service    SUBJECTIVE: Patient is doing well with IV PCA and no significant problems reported.  As per RN, patient has been sleeping, comfortable and just woke up to press the button.  Patient complaining of pain on her back from being on the stretcher.    Pain Scale Score	At rest: __9/10_ 	With Activity: ___ 	[X ] Refer to charted pain scores    THERAPY:    [ ] IV PCA Morphine		[ ] 5 mg/mL	[ ] 1 mg/mL  [X ] IV PCA Hydromorphone	[ ] 5 mg/mL	[X ] 1 mg/mL  [ ] IV PCA Fentanyl		[ ] 50 micrograms/mL    Demand dose __0.2_ lockout __6_ (minutes) Continuous Rate _0__ Total: _0.7__  mg used (in past 24 hours)      MEDICATIONS  (STANDING):  acetaminophen  IVPB .. 1000 milliGRAM(s) IV Intermittent once  ciprofloxacin   IVPB 400 milliGRAM(s) IV Intermittent every 12 hours  HYDROmorphone PCA (1 mG/mL) 30 milliLiter(s) PCA Continuous PCA Continuous  ketorolac   Injectable 15 milliGRAM(s) IV Push every 6 hours  lactated ringers. 1000 milliLiter(s) (125 mL/Hr) IV Continuous <Continuous>  lidocaine   Patch 1 Patch Transdermal every 24 hours    MEDICATIONS  (PRN):  HYDROmorphone PCA (1 mG/mL) Rescue Clinician Bolus 0.5 milliGRAM(s) IV Push every 15 minutes PRN for Pain Scale GREATER THAN 6  metoclopramide Injectable 10 milliGRAM(s) IV Push once PRN Nausea and/or Vomiting  nalbuphine Injectable 5 milliGRAM(s) IV Push every 6 hours PRN Pruritus  naloxone Injectable 0.1 milliGRAM(s) IV Push every 3 minutes PRN For ANY of the following changes in patient status:  A. RR LESS THAN 10 breaths per minute, B. Oxygen saturation LESS THAN 90%, C. Sedation score of 6  ondansetron Injectable 4 milliGRAM(s) IV Push every 6 hours PRN Nausea  ondansetron Injectable 4 milliGRAM(s) IV Push once PRN Nausea and/or Vomiting      OBJECTIVE:  patient is lying on stretcher.    Sedation Score:	[ X] Alert	[ ] Drowsy 	[ ] Arousable	[ ] Asleep	[ ] Unresponsive    Side Effects:	[X ] None	[ ] Nausea	[ ] Vomiting	[ ] Pruritus  		[ ] Other:    Vital Signs Last 24 Hrs  T(C): 36.9 (05 Jun 2019 06:30), Max: 37.1 (04 Jun 2019 22:11)  T(F): 98.4 (05 Jun 2019 06:30), Max: 98.7 (04 Jun 2019 22:11)  HR: 94 (05 Jun 2019 08:30) (76 - 124)  BP: 131/76 (05 Jun 2019 08:30) (102/71 - 135/83)  BP(mean): 90 (05 Jun 2019 08:30) (86 - 99)  RR: 20 (05 Jun 2019 08:30) (16 - 26)  SpO2: 98% (05 Jun 2019 08:30) (95% - 100%)    ASSESSMENT/ PLAN    Therapy to  be:	[ X] Continue   [ ] Discontinued   [ ] Change to prn Analgesics    Documentation and Verification of current medications:   [X] Done	[ ] Not done, not elligible    Comments:  Advised patient to use the IV PCA when needed for pain.  Continue current pain regimen, IV Tylenol added.    Progress Note written now but Patient was seen earlier.

## 2019-06-05 NOTE — CHART NOTE - NSCHARTNOTEFT_GEN_A_CORE
GYN Postoperative Check:     35yo status post UAE for vaginal bleeding status post IM MTX and intrasac injection for cervical ectopic on 3/2019    Patient seen and examined at bedside. Patient complains of continued lower abdominal cramping pain status post procedure. Pt has received fentanyl 50mcg and dilaudid 1mg since arrival to PACU at 3:45. Pt to receive Toradol now with initiation of PCA. Pt admits to nausea with pain upon admission to PACU; resolved with antiemetic and pain control. Denies CP, SOB, fevers, and chills. Cervical Berry in place with drainage of approx 50cc total since placement. Berry in place with adequate output of 225cc/1.5h.     Vital Signs Last 24 Hours  T(C): 37 (06-05-19 @ 03:45), Max: 37.1 (06-04-19 @ 22:11)  HR: 91 (06-05-19 @ 05:30) (76 - 124)  BP: 135/83 (06-05-19 @ 05:30) (102/71 - 135/83)  RR: 24 (06-05-19 @ 05:30) (16 - 26)  SpO2: 98% (06-05-19 @ 05:30) (95% - 100%)    I&O's Summary      Physical Exam:  General: NAD  CV: NR, RR, S1, S2, no M/R/G  Lungs: CTA-B  Abdomen: Soft, non-distended, appropriately tender  Incision: R groin site with dressing CDI  Ext: B/l lower extremities warm, well perfused with dorsalis pedis pulses palpated and equal bilaterally.     Labs:             12.3   20.54 )-----------( 275      ( 06-05 @ 04:20 )             37.3                11.6   27.32 )-----------( 263      ( 06-05 @ 00:02 )             35.7                10.1   13.81 )-----------( 347      ( 06-04 @ 21:37 )             31.6                11.1   14.49 )-----------( 371      ( 06-04 @ 18:55 )             35.5         MEDICATIONS  (STANDING):  chlorhexidine 4% Liquid 1 Application(s) Topical <User Schedule>  ciprofloxacin   IVPB 400 milliGRAM(s) IV Intermittent every 12 hours  HYDROmorphone PCA (1 mG/mL) 30 milliLiter(s) PCA Continuous PCA Continuous  ketorolac   Injectable 15 milliGRAM(s) IV Push every 6 hours  lactated ringers. 1000 milliLiter(s) (125 mL/Hr) IV Continuous <Continuous>    MEDICATIONS  (PRN):  fentaNYL    Injectable 25 MICROGram(s) IV Push every 5 minutes PRN Moderate Pain (4 - 6)  HYDROmorphone  Injectable 0.25 milliGRAM(s) IV Push every 10 minutes PRN Moderate Pain (4 - 6)  HYDROmorphone  Injectable 0.5 milliGRAM(s) IV Push every 10 minutes PRN Severe Pain (7 - 10)  HYDROmorphone PCA (1 mG/mL) Rescue Clinician Bolus 0.5 milliGRAM(s) IV Push every 15 minutes PRN for Pain Scale GREATER THAN 6  metoclopramide Injectable 10 milliGRAM(s) IV Push once PRN Nausea and/or Vomiting  nalbuphine Injectable 5 milliGRAM(s) IV Push every 6 hours PRN Pruritus  naloxone Injectable 0.1 milliGRAM(s) IV Push every 3 minutes PRN For ANY of the following changes in patient status:  A. RR LESS THAN 10 breaths per minute, B. Oxygen saturation LESS THAN 90%, C. Sedation score of 6  ondansetron Injectable 4 milliGRAM(s) IV Push every 6 hours PRN Nausea  ondansetron Injectable 4 milliGRAM(s) IV Push once PRN Nausea and/or Vomiting        Neuro: PCA, Toradol for pain control  CV: Hemodynamically stable. Hct stable 37.3->35.7  Pulm: Saturating well on room air. Encourage incentive spirometry  GI: NPO  : UOP adequate, continue to monitor. Cervical berry with 50cc output total, 20cc since procedure, continue to monitor output. C/w regular pad checks  Heme: SCDs for DVT ppx.   Dispo: accepted to SICU. appreciate SICU care    PA Kim PGY-2 GYN Postoperative Check:     33yo status post UAE for vaginal bleeding status post IM MTX and intrasac injection for cervical ectopic on 3/2019    Patient seen and examined at bedside. Patient complains of continued lower abdominal cramping pain status post procedure. Pt has received fentanyl 50mcg and dilaudid 1mg since arrival to PACU at 3:45. Pt to receive Toradol now with initiation of PCA. Pt admits to nausea with pain upon admission to PACU; resolved with antiemetic and pain control. Denies CP, SOB, fevers, and chills. Cervical Berry in place with drainage of approx 50cc total since placement. Berry in place with adequate output of 225cc/1.5h.     Vital Signs Last 24 Hours  T(C): 37 (06-05-19 @ 03:45), Max: 37.1 (06-04-19 @ 22:11)  HR: 91 (06-05-19 @ 05:30) (76 - 124)  BP: 135/83 (06-05-19 @ 05:30) (102/71 - 135/83)  RR: 24 (06-05-19 @ 05:30) (16 - 26)  SpO2: 98% (06-05-19 @ 05:30) (95% - 100%)    I&O's Summary      Physical Exam:  General: NAD  CV: NR, RR, S1, S2, no M/R/G  Lungs: CTA-B  Abdomen: Soft, non-distended, appropriately tender  Incision: R groin site with dressing CDI  Ext: B/l lower extremities warm, well perfused with dorsalis pedis pulses palpated and equal bilaterally.     Labs:             12.3   20.54 )-----------( 275      ( 06-05 @ 04:20 )             37.3                11.6   27.32 )-----------( 263      ( 06-05 @ 00:02 )             35.7                10.1   13.81 )-----------( 347      ( 06-04 @ 21:37 )             31.6                11.1   14.49 )-----------( 371      ( 06-04 @ 18:55 )             35.5         MEDICATIONS  (STANDING):  chlorhexidine 4% Liquid 1 Application(s) Topical <User Schedule>  ciprofloxacin   IVPB 400 milliGRAM(s) IV Intermittent every 12 hours  HYDROmorphone PCA (1 mG/mL) 30 milliLiter(s) PCA Continuous PCA Continuous  ketorolac   Injectable 15 milliGRAM(s) IV Push every 6 hours  lactated ringers. 1000 milliLiter(s) (125 mL/Hr) IV Continuous <Continuous>    MEDICATIONS  (PRN):  fentaNYL    Injectable 25 MICROGram(s) IV Push every 5 minutes PRN Moderate Pain (4 - 6)  HYDROmorphone  Injectable 0.25 milliGRAM(s) IV Push every 10 minutes PRN Moderate Pain (4 - 6)  HYDROmorphone  Injectable 0.5 milliGRAM(s) IV Push every 10 minutes PRN Severe Pain (7 - 10)  HYDROmorphone PCA (1 mG/mL) Rescue Clinician Bolus 0.5 milliGRAM(s) IV Push every 15 minutes PRN for Pain Scale GREATER THAN 6  metoclopramide Injectable 10 milliGRAM(s) IV Push once PRN Nausea and/or Vomiting  nalbuphine Injectable 5 milliGRAM(s) IV Push every 6 hours PRN Pruritus  naloxone Injectable 0.1 milliGRAM(s) IV Push every 3 minutes PRN For ANY of the following changes in patient status:  A. RR LESS THAN 10 breaths per minute, B. Oxygen saturation LESS THAN 90%, C. Sedation score of 6  ondansetron Injectable 4 milliGRAM(s) IV Push every 6 hours PRN Nausea  ondansetron Injectable 4 milliGRAM(s) IV Push once PRN Nausea and/or Vomiting        Neuro: PCA, Toradol for pain control.  CV: Hemodynamically stable. Hct stable 37.3->35.7  Pulm: Saturating well on room air. Encourage incentive spirometry  GI: NPO  : UOP adequate, continue to monitor. Cervical berry with 50cc output total, 20cc since procedure, continue to monitor output. C/w regular pad checks  Heme: SCDs for DVT ppx. pt to remain laying flat on back w/ no bending of R leg for 4h post IR procedure (until 8a)  Dispo: accepted to SICU. appreciate SICU care    PA Kim PGY-2

## 2019-06-05 NOTE — PROGRESS NOTE ADULT - SUBJECTIVE AND OBJECTIVE BOX
Patient seen and examined at bedside. complains of pain. Not pressing the PCA button frequently, though. Also requesting to drink. Patient is laying flat per IR recs. Patient is NPO. Denies CP, SOB, N/V, fevers, and chills. Cervical berry in place.     Vital Signs Last 24 Hours  T(C): 36.9 (06-05-19 @ 06:30), Max: 37.1 (06-04-19 @ 22:11)  HR: 95 (06-05-19 @ 07:00) (76 - 124)  BP: 122/70 (06-05-19 @ 07:00) (102/71 - 135/83)  RR: 22 (06-05-19 @ 07:00) (16 - 26)  SpO2: 98% (06-05-19 @ 07:00) (95% - 100%)    I&O's Detail    04 Jun 2019 07:01  -  05 Jun 2019 07:00  --------------------------------------------------------  IN:    lactated ringers.: 375 mL    Plasma: 300 mL  Total IN: 675 mL    OUT:    Drain: 50 mL    Indwelling Catheter - Urethral: 450 mL  Total OUT: 500 mL    Total NET: 175 mL          Physical Exam:  General: NAD  CV: NR, RR, S1, S2, no M/R/G  Lungs: CTA-B  Abdomen: Soft, non-tender, non-distended.  Incision: UAE dressing intact, no leakage.  Vaginal: bleeding scant/no active bleeding at this time, berry in place. 50cc dark red blood from cervix in berry bag.  Ext: No pain or swelling    Labs:             12.3   20.54<H> )-----------( 275      ( 06-05 @ 04:20 )             37.3                11.6   27.32<H> )-----------( 263      ( 06-05 @ 00:02 )             35.7                10.1<L>  13.81<H> )-----------( 347      ( 06-04 @ 21:37 )             31.6<L>               11.1<L>  14.49<H> )-----------( 371      ( 06-04 @ 18:55 )             35.5         MEDICATIONS  (STANDING):  ciprofloxacin   IVPB 400 milliGRAM(s) IV Intermittent every 12 hours  HYDROmorphone PCA (1 mG/mL) 30 milliLiter(s) PCA Continuous PCA Continuous  ketorolac   Injectable 15 milliGRAM(s) IV Push every 6 hours  lactated ringers. 1000 milliLiter(s) (125 mL/Hr) IV Continuous <Continuous>    MEDICATIONS  (PRN):  fentaNYL    Injectable 25 MICROGram(s) IV Push every 5 minutes PRN Moderate Pain (4 - 6)  HYDROmorphone  Injectable 0.25 milliGRAM(s) IV Push every 10 minutes PRN Moderate Pain (4 - 6)  HYDROmorphone  Injectable 0.5 milliGRAM(s) IV Push every 10 minutes PRN Severe Pain (7 - 10)  HYDROmorphone PCA (1 mG/mL) Rescue Clinician Bolus 0.5 milliGRAM(s) IV Push every 15 minutes PRN for Pain Scale GREATER THAN 6  metoclopramide Injectable 10 milliGRAM(s) IV Push once PRN Nausea and/or Vomiting  nalbuphine Injectable 5 milliGRAM(s) IV Push every 6 hours PRN Pruritus  naloxone Injectable 0.1 milliGRAM(s) IV Push every 3 minutes PRN For ANY of the following changes in patient status:  A. RR LESS THAN 10 breaths per minute, B. Oxygen saturation LESS THAN 90%, C. Sedation score of 6  ondansetron Injectable 4 milliGRAM(s) IV Push every 6 hours PRN Nausea  ondansetron Injectable 4 milliGRAM(s) IV Push once PRN Nausea and/or Vomiting

## 2019-06-05 NOTE — PROGRESS NOTE ADULT - SUBJECTIVE AND OBJECTIVE BOX
Anesthesia Pain Management Service- Attending Addendum    SUBJECTIVE: Pt doing well with IV PCA- not using much     Therapy:	  [ X] IV PCA	   [ ] Epidural           [ ] s/p Spinal Opoid              [ ] Postpartum infusion	  [ ] Patient controlled regional anesthesia (PCRA)    [ ] prn Analgesics    Allergies    No Known Drug Allergies  peanuts (Anaphylaxis)  Sesame (Unknown)    Intolerances      MEDICATIONS  (STANDING):  acetaminophen  IVPB .. 1000 milliGRAM(s) IV Intermittent once  chlorhexidine 4% Liquid 1 Application(s) Topical <User Schedule>  ciprofloxacin   IVPB 400 milliGRAM(s) IV Intermittent every 12 hours  HYDROmorphone PCA (1 mG/mL) 30 milliLiter(s) PCA Continuous PCA Continuous  lactated ringers. 1000 milliLiter(s) (125 mL/Hr) IV Continuous <Continuous>  lidocaine   Patch 1 Patch Transdermal every 24 hours    MEDICATIONS  (PRN):  HYDROmorphone PCA (1 mG/mL) Rescue Clinician Bolus 0.5 milliGRAM(s) IV Push every 15 minutes PRN for Pain Scale GREATER THAN 6  nalbuphine Injectable 5 milliGRAM(s) IV Push every 6 hours PRN Pruritus  naloxone Injectable 0.1 milliGRAM(s) IV Push every 3 minutes PRN For ANY of the following changes in patient status:  A. RR LESS THAN 10 breaths per minute, B. Oxygen saturation LESS THAN 90%, C. Sedation score of 6  ondansetron Injectable 4 milliGRAM(s) IV Push every 6 hours PRN Nausea      OBJECTIVE:   [X] No new signs     [ ] Other:    Side Effects:  [X ] None			[ ] Other:    Assessment of Catheter Site:		[ ] Intact		[ ] Other:    ASSESSMENT/PLAN  [ X] Continue current therapy    [ ] Therapy changed to:    [ ] IV PCA       [ ] Epidural     [ ] prn Analgesics     Comments:

## 2019-06-06 ENCOUNTER — APPOINTMENT (OUTPATIENT)
Dept: OBGYN | Facility: HOSPITAL | Age: 34
End: 2019-06-06

## 2019-06-06 LAB
ANION GAP SERPL CALC-SCNC: 12 MMO/L — SIGNIFICANT CHANGE UP (ref 7–14)
BUN SERPL-MCNC: 7 MG/DL — SIGNIFICANT CHANGE UP (ref 7–23)
CALCIUM SERPL-MCNC: 8.1 MG/DL — LOW (ref 8.4–10.5)
CHLORIDE SERPL-SCNC: 106 MMOL/L — SIGNIFICANT CHANGE UP (ref 98–107)
CO2 SERPL-SCNC: 22 MMOL/L — SIGNIFICANT CHANGE UP (ref 22–31)
CREAT SERPL-MCNC: 0.87 MG/DL — SIGNIFICANT CHANGE UP (ref 0.5–1.3)
GLUCOSE SERPL-MCNC: 103 MG/DL — HIGH (ref 70–99)
HCT VFR BLD CALC: 27.9 % — LOW (ref 34.5–45)
HCT VFR BLD CALC: 29.8 % — LOW (ref 34.5–45)
HGB BLD-MCNC: 9.4 G/DL — LOW (ref 11.5–15.5)
HGB BLD-MCNC: 9.8 G/DL — LOW (ref 11.5–15.5)
MAGNESIUM SERPL-MCNC: 2.1 MG/DL — SIGNIFICANT CHANGE UP (ref 1.6–2.6)
MCHC RBC-ENTMCNC: 28.7 PG — SIGNIFICANT CHANGE UP (ref 27–34)
MCHC RBC-ENTMCNC: 28.7 PG — SIGNIFICANT CHANGE UP (ref 27–34)
MCHC RBC-ENTMCNC: 32.9 % — SIGNIFICANT CHANGE UP (ref 32–36)
MCHC RBC-ENTMCNC: 33.7 % — SIGNIFICANT CHANGE UP (ref 32–36)
MCV RBC AUTO: 85.1 FL — SIGNIFICANT CHANGE UP (ref 80–100)
MCV RBC AUTO: 87.1 FL — SIGNIFICANT CHANGE UP (ref 80–100)
NRBC # FLD: 0 K/UL — SIGNIFICANT CHANGE UP (ref 0–0)
NRBC # FLD: 0 K/UL — SIGNIFICANT CHANGE UP (ref 0–0)
PHOSPHATE SERPL-MCNC: 2.2 MG/DL — LOW (ref 2.5–4.5)
PLATELET # BLD AUTO: 230 K/UL — SIGNIFICANT CHANGE UP (ref 150–400)
PLATELET # BLD AUTO: 236 K/UL — SIGNIFICANT CHANGE UP (ref 150–400)
PMV BLD: 10.5 FL — SIGNIFICANT CHANGE UP (ref 7–13)
PMV BLD: 10.6 FL — SIGNIFICANT CHANGE UP (ref 7–13)
POTASSIUM SERPL-MCNC: 3.8 MMOL/L — SIGNIFICANT CHANGE UP (ref 3.5–5.3)
POTASSIUM SERPL-SCNC: 3.8 MMOL/L — SIGNIFICANT CHANGE UP (ref 3.5–5.3)
RBC # BLD: 3.28 M/UL — LOW (ref 3.8–5.2)
RBC # BLD: 3.42 M/UL — LOW (ref 3.8–5.2)
RBC # FLD: 14.1 % — SIGNIFICANT CHANGE UP (ref 10.3–14.5)
RBC # FLD: 14.1 % — SIGNIFICANT CHANGE UP (ref 10.3–14.5)
SODIUM SERPL-SCNC: 140 MMOL/L — SIGNIFICANT CHANGE UP (ref 135–145)
WBC # BLD: 14.78 K/UL — HIGH (ref 3.8–10.5)
WBC # BLD: 18.01 K/UL — HIGH (ref 3.8–10.5)
WBC # FLD AUTO: 14.78 K/UL — HIGH (ref 3.8–10.5)
WBC # FLD AUTO: 18.01 K/UL — HIGH (ref 3.8–10.5)

## 2019-06-06 PROCEDURE — 99291 CRITICAL CARE FIRST HOUR: CPT

## 2019-06-06 PROCEDURE — 99234 HOSP IP/OBS SM DT SF/LOW 45: CPT

## 2019-06-06 RX ORDER — POTASSIUM PHOSPHATE, MONOBASIC POTASSIUM PHOSPHATE, DIBASIC 236; 224 MG/ML; MG/ML
15 INJECTION, SOLUTION INTRAVENOUS ONCE
Refills: 0 | Status: COMPLETED | OUTPATIENT
Start: 2019-06-06 | End: 2019-06-06

## 2019-06-06 RX ORDER — HYDROMORPHONE HYDROCHLORIDE 2 MG/ML
1 INJECTION INTRAMUSCULAR; INTRAVENOUS; SUBCUTANEOUS EVERY 4 HOURS
Refills: 0 | Status: DISCONTINUED | OUTPATIENT
Start: 2019-06-06 | End: 2019-06-06

## 2019-06-06 RX ORDER — IBUPROFEN 200 MG
600 TABLET ORAL EVERY 6 HOURS
Refills: 0 | Status: DISCONTINUED | OUTPATIENT
Start: 2019-06-06 | End: 2019-06-07

## 2019-06-06 RX ORDER — ACETAMINOPHEN 500 MG
975 TABLET ORAL EVERY 6 HOURS
Refills: 0 | Status: DISCONTINUED | OUTPATIENT
Start: 2019-06-06 | End: 2019-06-07

## 2019-06-06 RX ORDER — OXYCODONE HYDROCHLORIDE 5 MG/1
5 TABLET ORAL EVERY 6 HOURS
Refills: 0 | Status: DISCONTINUED | OUTPATIENT
Start: 2019-06-06 | End: 2019-06-07

## 2019-06-06 RX ADMIN — Medication 200 MILLIGRAM(S): at 04:13

## 2019-06-06 RX ADMIN — Medication 200 MILLIGRAM(S): at 18:12

## 2019-06-06 RX ADMIN — Medication 600 MILLIGRAM(S): at 20:55

## 2019-06-06 RX ADMIN — LIDOCAINE 1 PATCH: 4 CREAM TOPICAL at 13:03

## 2019-06-06 RX ADMIN — Medication 975 MILLIGRAM(S): at 20:55

## 2019-06-06 RX ADMIN — Medication 975 MILLIGRAM(S): at 20:24

## 2019-06-06 RX ADMIN — SODIUM CHLORIDE 125 MILLILITER(S): 9 INJECTION, SOLUTION INTRAVENOUS at 07:28

## 2019-06-06 RX ADMIN — Medication 600 MILLIGRAM(S): at 20:24

## 2019-06-06 RX ADMIN — POTASSIUM PHOSPHATE, MONOBASIC POTASSIUM PHOSPHATE, DIBASIC 62.5 MILLIMOLE(S): 236; 224 INJECTION, SOLUTION INTRAVENOUS at 05:41

## 2019-06-06 RX ADMIN — HYDROMORPHONE HYDROCHLORIDE 30 MILLILITER(S): 2 INJECTION INTRAMUSCULAR; INTRAVENOUS; SUBCUTANEOUS at 07:29

## 2019-06-06 NOTE — PROGRESS NOTE ADULT - ASSESSMENT
ASSESSMENT:  Patient is a 34 year old female  S/P IM and intra-sac MTX on 3/1 for cervical ectopic pregnancy who presented to the ED with complaint of abnormal vaginal bleeding x4 weeks.  Patient previously admitted  with complaint of intermittent vaginal bleeding that began on  filling <2 pads per day.  During prior admission she was rescanned by ATU and no evidence of pregnancy was seen so she was discharged with plan to follow up in clinic as her bleeding was stable at that time.  Patient's bHCG has been down trending from 360176(3/1)->48(5/15)->15().  Now S/P successful embolization of bilateral uterine arteries with Avitene slurry.       PLAN:    NEUROLOGY:  - Alert and oriented  - Continue with PCA pump and IV Tylenol for pain control    RESPIRATORY:  - Saturating well on room air  - Maintain O2 saturation >92%    CARDIOVASCULAR:  - Normotensive.  No pressor requirements  - Keep MAP >65    GI / NUTRITION:  - Currently NPO    RENAL / GENITOURINARY:  - Indwelling berry catheter  - Monitor electrolytes and replete PRN  - Continue to monitor strict ins and outs q1 hour  - OB / GYN to decide when cervical balloon is removed    HEMATOLOGIC:  - Hemoglobin and hematocrit remain stable  - Continue to monitor CBC q8 hours     INFECTIOUS DISEASE:  - Currently afebrile with no leukocytosis  - Continue with IV Ciprofloxacin x3 doses    ENDOCRINOLOGY:  - No active issues  - Continue to monitor glucose on BMP (goal 140-180)      Disposition: SICU    -------------------------------------------------------------------------------------- ASSESSMENT:  Patient is a 34 year old female  S/P IM and intra-sac MTX on 3/1 for cervical ectopic pregnancy who presented to the ED with complaint of abnormal vaginal bleeding x4 weeks.  Patient previously admitted  with complaint of intermittent vaginal bleeding that began on  filling <2 pads per day.  During prior admission she was rescanned by ATU and no evidence of pregnancy was seen so she was discharged with plan to follow up in clinic as her bleeding was stable at that time.  Patient's bHCG has been down trending from 468655(3/1)->48(5/15)->15().  Now S/P successful embolization of bilateral uterine arteries with Avitene slurry.       PLAN:    NEUROLOGY:  - Alert and oriented  - Continue with PCA pump and IV Tylenol for pain control    RESPIRATORY:  - Saturating well on room air  - Maintain O2 saturation >92%    CARDIOVASCULAR:  - Normotensive.  No pressor requirements  - Keep MAP >65    GI / NUTRITION:  - Currently NPO    RENAL / GENITOURINARY:  - Indwelling berry catheter  - Monitor electrolytes and replete PRN  - Continue to monitor strict ins and outs q1 hour    HEMATOLOGIC:  - Hemoglobin and hematocrit remain stable  - Continue to monitor CBC q8 hours   - Plan to remove cervical balloon today.     INFECTIOUS DISEASE:  - Currently afebrile with no leukocytosis  - Continue with IV Ciprofloxacin x3 doses    ENDOCRINOLOGY:  - No active issues  - Continue to monitor glucose on BMP (goal 140-180)      Disposition: Transfer to floor after removal of cervical balloon.     --------------------------------------------------------------------------------------

## 2019-06-06 NOTE — PROGRESS NOTE ADULT - SUBJECTIVE AND OBJECTIVE BOX
SICU AM Progress Note  =====================================================  Interval Events: No acute events overnight. Cervical balloon remains in place - to be removed by OB/GYN.      HPI:  Patient is a 34 year old female  S/P IM and intra-sac MTX on 3/1 for cervical ectopic pregnancy who presented to the ED with complaint of abnormal vaginal bleeding x4 weeks.  Patient previously admitted  with complaint of intermittent vaginal bleeding that began on  filling <2 pads per day.  During prior admission she was rescanned by ATU and no evidence of pregnancy was seen so she was discharged with plan to follow up in clinic as her bleeding was stable at that time.  Patient's bHCG has been down trending from 970520(3/1)->48(5/15)->15().      PAST MEDICAL & SURGICAL HISTORY:  Ectopic pregnancy: cervical  Anxiety  Depression  H/O shoulder surgery: 18      ALLERGIES:  No Known Drug Allergies  peanuts (Anaphylaxis)  Sesame (Unknown)    --------------------------------------------------------------------------------------    MEDICATIONS:    Neurologic Medications  acetaminophen  IVPB .. 1000 milliGRAM(s) IV Intermittent once  HYDROmorphone PCA (1 mG/mL) 30 milliLiter(s) PCA Continuous PCA Continuous  HYDROmorphone PCA (1 mG/mL) Rescue Clinician Bolus 0.5 milliGRAM(s) IV Push every 15 minutes PRN for Pain Scale GREATER THAN 6  ketorolac   Injectable 15 milliGRAM(s) IV Push every 6 hours  nalbuphine Injectable 5 milliGRAM(s) IV Push every 6 hours PRN Pruritus  ondansetron Injectable 4 milliGRAM(s) IV Push every 6 hours PRN Nausea    Gastrointestinal Medications  lactated ringers. 1000 milliLiter(s) IV Continuous <Continuous>    Antimicrobial/Immunologic Medications  ciprofloxacin   IVPB 400 milliGRAM(s) IV Intermittent every 12 hours    Topical/Other Medications  chlorhexidine 4% Liquid 1 Application(s) Topical <User Schedule>  lidocaine   Patch 1 Patch Transdermal every 24 hours  naloxone Injectable 0.1 milliGRAM(s) IV Push every 3 minutes PRN For ANY of the following changes in patient status:  A. RR LESS THAN 10 breaths per minute, B. Oxygen saturation LESS THAN 90%, C. Sedation score of 6    --------------------------------------------------------------------------------------  VITAL SIGNS:    T(C): 37.1 (19 @ 23:35), Max: 37.4 (19 @ 09:52)  HR: 91 (19 @ 23:35) (70 - 100)  BP: 114/55 (19 @ 23:35) (97/43 - 135/83)  ABP: --  ABP(mean): --  RR: 20 (19 @ 23:35) (13 - 26)  SpO2: 100% (19 @ 23:35) (95% - 100%)  Wt(kg): --  CVP(mm Hg): --       @ 07:01  -   @ 07:00  --------------------------------------------------------  IN:    lactated ringers.: 375 mL    Plasma: 300 mL  Total IN: 675 mL    OUT:    Drain: 50 mL    Indwelling Catheter - Urethral: 450 mL  Total OUT: 500 mL    Total NET: 175 mL       @ 07:01  -   @ 00:23  --------------------------------------------------------  IN:    lactated ringers.: 2000 mL  Total IN: 2000 mL    OUT:    Drain: 55 mL    Indwelling Catheter - Urethral: 2350 mL  Total OUT: 2405 mL    Total NET: -405 mL      --------------------------------------------------------------------------------------    EXAM    NEUROLOGY  Exam: Normal, in no acute distress.  Alert and oriented x4.  No focal neurologic deficits.    HEENT  Exam: Normocephalic, atraumatic.    RESPIRATORY  Exam: Lungs clear to auscultation, Normal expansion/effort.    CARDIOVASCULAR  Exam: S1, S2.  Regular rate and rhythm.    GI/NUTRITION  Exam: Abdomen soft, Non-tender, Non-distended.  Current Diet: NPO    VASCULAR  Exam: Extremities warm, pink, well-perfused.    MUSCULOSKELETAL  Exam: All extremities moving spontaneously without limitations.    SKIN  Exam: Good skin turgor, no skin breakdown.    METABOLIC / FLUIDS / ELECTROLYTES  lactated ringers. 1000 milliLiter(s) IV Continuous <Continuous>    HEMATOLOGIC  [x] VTE Prophylaxis:     INFECTIOUS DISEASE  Antimicrobials/Immunologic Medications:  ciprofloxacin   IVPB 400 milliGRAM(s) IV Intermittent every 12 hours      TUBES / LINES / DRAINS  [x] Peripheral IV  [] Central Venous Line     	[] R	[] L	[] IJ	[] Fem	[] SC	Date Placed:   [] Arterial Line		[] R	[] L	[] Fem	[] Rad	[] Ax	Date Placed:   [] PICC		[] Midline		[] Mediport  [] Urinary Catheter		Date Placed:   [x] Necessity of urinary, arterial, and venous catheters discussed    --------------------------------------------------------------------------------------    LABS    CBC ( @ 20:48)                              10.7<L>                         17.69<H>  )----------------(  241        --    % Neutrophils, --    % Lymphocytes, ANC: --                                  31.8<L>  CBC ( @ 16:30)                              10.8<L>                         17.02<H>  )----------------(  254        --    % Neutrophils, --    % Lymphocytes, ANC: --                                  32.9<L>    BMP ( @ 08:00)             135     |  104     |  8     		Ca++ --      Ca 8.0<L>             ---------------------------------( 176<H>		Mg 2.3                4.0     |  22      |  0.75  			Ph 1.9<L>  BMP ( @ 23:58)             137     |  106     |  11    		Ca++ --      Ca 7.5<L>             ---------------------------------( 141<H>		Mg 1.5<L>             4.7     |  20<L>   |  0.90  			Ph 3.1       LFTs ( @ 18:55)      TPro 7.0 / Alb 3.8 / TBili 0.3 / DBili -- / AST 26 / ALT 32 / AlkPhos 50    Coags ( @ 08:00)  aPTT -- / INR 1.11 / PT 12.7  Coags ( @ 23:58)  aPTT 24.6<L> / INR 1.14 / PT 12.7            --------------------------------------------------------------------------------------

## 2019-06-06 NOTE — PROGRESS NOTE ADULT - SUBJECTIVE AND OBJECTIVE BOX
Patient seen and examined at bedside. No acute complaints, pain well controlled. Patient is ambulating, passing flatus. Berry in place. Cervical berry in place. Tolerating ice chips. Denies CP, SOB, N/V, fevers, and chills.    Vital Signs Last 24 Hours  T(C): 37.2 (06-06-19 @ 04:00), Max: 37.4 (06-05-19 @ 09:52)  HR: 85 (06-06-19 @ 05:00) (70 - 98)  BP: 109/56 (06-06-19 @ 05:00) (97/43 - 133/67)  RR: 22 (06-06-19 @ 05:00) (13 - 25)  SpO2: 100% (06-06-19 @ 05:00) (94% - 100%)    I&O's Detail    05 Jun 2019 07:01  -  06 Jun 2019 07:00  --------------------------------------------------------  IN:    IV PiggyBack: 450 mL    lactated ringers.: 3000 mL  Total IN: 3450 mL    OUT:    Drain: 65 mL    Indwelling Catheter - Urethral: 2885 mL  Total OUT: 2950 mL    Total NET: 500 mL          Physical Exam:  General: NAD. o2 sat on ear  Abdomen: Soft, non-tender, non-distended, +BS  Incision: UAE sites C/D/I  Vaginal: scant blood on pad. 30cc dark red blood in berry.  Ext: No pain or swelling. DP 2+ B/L    Labs:             9.4<L>  18.01<H> )-----------( 236      ( 06-06 @ 03:20 )             27.9<L>               10.7<L>  17.69<H> )-----------( 241      ( 06-05 @ 20:48 )             31.8<L>               10.8<L>  17.02<H> )-----------( 254      ( 06-05 @ 16:30 )             32.9<L>               11.4<L>  14.62<H> )-----------( 260      ( 06-05 @ 12:02 )             34.4<L>               11.3<L>  15.58<H> )-----------( 258      ( 06-05 @ 08:00 )             33.5<L>        MEDICATIONS  (STANDING):  acetaminophen  IVPB .. 1000 milliGRAM(s) IV Intermittent once  chlorhexidine 4% Liquid 1 Application(s) Topical <User Schedule>  ciprofloxacin   IVPB 400 milliGRAM(s) IV Intermittent every 12 hours  HYDROmorphone PCA (1 mG/mL) 30 milliLiter(s) PCA Continuous PCA Continuous  lactated ringers. 1000 milliLiter(s) (125 mL/Hr) IV Continuous <Continuous>  lidocaine   Patch 1 Patch Transdermal every 24 hours    MEDICATIONS  (PRN):  HYDROmorphone PCA (1 mG/mL) Rescue Clinician Bolus 0.5 milliGRAM(s) IV Push every 15 minutes PRN for Pain Scale GREATER THAN 6  nalbuphine Injectable 5 milliGRAM(s) IV Push every 6 hours PRN Pruritus  naloxone Injectable 0.1 milliGRAM(s) IV Push every 3 minutes PRN For ANY of the following changes in patient status:  A. RR LESS THAN 10 breaths per minute, B. Oxygen saturation LESS THAN 90%, C. Sedation score of 6  ondansetron Injectable 4 milliGRAM(s) IV Push every 6 hours PRN Nausea

## 2019-06-06 NOTE — PROGRESS NOTE ADULT - SUBJECTIVE AND OBJECTIVE BOX
Anesthesia Pain Management Service    SUBJECTIVE: Patient is doing well with IV PCA and no significant problems reported.    Pain Scale Score	At rest: _2/10__ 	With Activity: ___ 	[X ] Refer to charted pain scores    THERAPY:    [ ] IV PCA Morphine		[ ] 5 mg/mL	[ ] 1 mg/mL  [X ] IV PCA Hydromorphone	[ ] 5 mg/mL	[X ] 1 mg/mL  [ ] IV PCA Fentanyl		[ ] 50 micrograms/mL    Demand dose __0.2_ lockout __6_ (minutes) Continuous Rate _0__ Total: _0.4__  mg used (in past 24 hours)      MEDICATIONS  (STANDING):  acetaminophen  IVPB .. 1000 milliGRAM(s) IV Intermittent once  chlorhexidine 4% Liquid 1 Application(s) Topical <User Schedule>  ciprofloxacin   IVPB 400 milliGRAM(s) IV Intermittent every 12 hours  HYDROmorphone PCA (1 mG/mL) 30 milliLiter(s) PCA Continuous PCA Continuous  lactated ringers. 1000 milliLiter(s) (125 mL/Hr) IV Continuous <Continuous>  lidocaine   Patch 1 Patch Transdermal every 24 hours    MEDICATIONS  (PRN):  HYDROmorphone PCA (1 mG/mL) Rescue Clinician Bolus 0.5 milliGRAM(s) IV Push every 15 minutes PRN for Pain Scale GREATER THAN 6  nalbuphine Injectable 5 milliGRAM(s) IV Push every 6 hours PRN Pruritus  naloxone Injectable 0.1 milliGRAM(s) IV Push every 3 minutes PRN For ANY of the following changes in patient status:  A. RR LESS THAN 10 breaths per minute, B. Oxygen saturation LESS THAN 90%, C. Sedation score of 6  ondansetron Injectable 4 milliGRAM(s) IV Push every 6 hours PRN Nausea      OBJECTIVE:  Patient lying in bed, NPO and comfortable.    Sedation Score:	[ X] Alert	[ ] Drowsy 	[ ] Arousable	[ ] Asleep	[ ] Unresponsive    Side Effects:	[X ] None	[ ] Nausea	[ ] Vomiting	[ ] Pruritus  		[ ] Other:    Vital Signs Last 24 Hrs  T(C): 37 (06 Jun 2019 08:00), Max: 37.4 (05 Jun 2019 09:52)  T(F): 98.6 (06 Jun 2019 08:00), Max: 99.3 (05 Jun 2019 09:52)  HR: 85 (06 Jun 2019 08:00) (70 - 98)  BP: 114/64 (06 Jun 2019 07:00) (97/43 - 133/67)  BP(mean): 74 (06 Jun 2019 07:00) (47 - 86)  RR: 24 (06 Jun 2019 08:00) (13 - 25)  SpO2: 99% (06 Jun 2019 08:00) (94% - 100%)    ASSESSMENT/ PLAN    Therapy to  be:	[ X] Continue   [ ] Discontinued   [ ] Change to prn Analgesics    Documentation and Verification of current medications:   [X] Done	[ ] Not done, not elligible    Comments: Continue current pain regimen, whilst patient is NPO.  Will transition to po pain medications, when tolerating po diet.

## 2019-06-06 NOTE — CHART NOTE - NSCHARTNOTEFT_GEN_A_CORE
Patient is 34y  s/p    Vital Signs Last 24 Hrs  T(C): 37.1 (05 Jun 2019 23:35), Max: 37.4 (05 Jun 2019 09:52)  T(F): 98.8 (05 Jun 2019 23:35), Max: 99.3 (05 Jun 2019 09:52)  HR: 91 (05 Jun 2019 23:35) (70 - 100)  BP: 114/55 (05 Jun 2019 23:35) (97/43 - 135/83)  BP(mean): 68 (05 Jun 2019 23:35) (47 - 99)  RR: 20 (05 Jun 2019 23:35) (13 - 26)  SpO2: 100% (05 Jun 2019 23:35) (95% - 100%)    I&O's Detail    04 Jun 2019 07:01  -  05 Jun 2019 07:00  --------------------------------------------------------  IN:    lactated ringers.: 375 mL    Plasma: 300 mL  Total IN: 675 mL    OUT:    Drain: 50 mL    Indwelling Catheter - Urethral: 450 mL  Total OUT: 500 mL    Total NET: 175 mL      05 Jun 2019 07:01  -  06 Jun 2019 00:51  --------------------------------------------------------  IN:    lactated ringers.: 2000 mL  Total IN: 2000 mL    OUT:    Drain: 65 mL    Indwelling Catheter - Urethral: 2475 mL  Total OUT: 2540 mL    Total NET: -540 mL          PE:  Gen: Appears comfortable  CV: RR s1s2  Pulm: CTA b/l  Abd: Soft, appropriately tender, no rebound  Pelvic: Zheng balloon palpated in cervix with end of balloon palpated at external os; no bright red bleeding in vaginal; scant dark red blood  Ext: NT b/l    Labs:                        10.7   17.69 )-----------( 241      ( 05 Jun 2019 20:48 )             31.8                         10.8   17.02 )-----------( 254      ( 05 Jun 2019 16:30 )             32.9                         11.4   14.62 )-----------( 260      ( 05 Jun 2019 12:02 )             34.4                         11.3   15.58 )-----------( 258      ( 05 Jun 2019 08:00 )             33.5                         12.3   20.54 )-----------( 275      ( 05 Jun 2019 04:20 )             37.3                         11.6   27.32 )-----------( 263      ( 05 Jun 2019 00:02 )             35.7                         10.1   13.81 )-----------( 347      ( 04 Jun 2019 21:37 )             31.6                         11.1   14.49 )-----------( 371      ( 04 Jun 2019 18:55 )             35.5     06-05    135  |  104  |  8   ----------------------------<  176<H>  4.0   |  22  |  0.75    Ca    8.0<L>      05 Jun 2019 08:00  Phos  1.9     06-05  Mg     2.3     06-05    TPro  7.0  /  Alb  3.8  /  TBili  0.3  /  DBili  x   /  AST  26  /  ALT  32  /  AlkPhos  50  06-04    PT/INR - ( 05 Jun 2019 08:00 )   PT: 12.7 SEC;   INR: 1.11          PTT - ( 04 Jun 2019 23:58 )  PTT:24.6 SEC      MEDICATIONS  (STANDING):  acetaminophen  IVPB .. 1000 milliGRAM(s) IV Intermittent once  chlorhexidine 4% Liquid 1 Application(s) Topical <User Schedule>  ciprofloxacin   IVPB 400 milliGRAM(s) IV Intermittent every 12 hours  HYDROmorphone PCA (1 mG/mL) 30 milliLiter(s) PCA Continuous PCA Continuous  lactated ringers. 1000 milliLiter(s) (125 mL/Hr) IV Continuous <Continuous>  lidocaine   Patch 1 Patch Transdermal every 24 hours      Assessment/Plan:           PA Kim PGY2  ------------------------------------------------------------------------------------------------------------- GYN R2 Overnight Progress Note    Pt seen and examined at bedside after moved from SICU to CTICU. Pt is without acute complaints. Endorses excellent pain control with IV Tylenol and is using the PCA pump occasionally as needed. Prior severe cramping pain from yesterday AM has resolved; now with mild intermittent abdominal cramping. Pt is tolerating ice chips however remains NPO awaiting removal of cervical balloon. Pt has been out of bed and ambulating this evening. Denies fever/chills, nausea/vomiting, CP/SOB, dizziness/lightheadedness. Overnight from 6-12pm approx 10cc dark red blood drained from cervical berry.     Vital Signs Last 24 Hrs  T(C): 37.1 (2019 23:35), Max: 37.4 (2019 09:52)  T(F): 98.8 (2019 23:35), Max: 99.3 (2019 09:52)  HR: 91 (2019 23:35) (70 - 100)  BP: 114/55 (2019 23:35) (97/43 - 135/83)  BP(mean): 68 (2019 23:35) (47 - 99)  RR: 20 (2019 23:35) (13 - 26)  SpO2: 100% (2019 23:35) (95% - 100%)    I&O's Detail    2019 07:01  -  2019 07:00  --------------------------------------------------------  IN:    lactated ringers.: 375 mL    Plasma: 300 mL  Total IN: 675 mL    OUT:    Drain: 50 mL    Indwelling Catheter - Urethral: 450 mL  Total OUT: 500 mL    Total NET: 175 mL      2019 07:01  -  2019 00:51  --------------------------------------------------------  IN:    lactated ringers.: 2000 mL  Total IN: 2000 mL    OUT:    Drain: 65 mL    Indwelling Catheter - Urethral: 2475 mL  Total OUT: 2540 mL    Total NET: -540 mL          PE:  Gen: Appears comfortable  CV: RR s1s2  Pulm: CTA b/l  Abd: Soft, appropriately tender, no rebound  Pelvic: Berry balloon palpated in cervix with end of balloon palpated at external os; no bright red bleeding in vagina; scant dark red blood noted.  Ext: NTBL, negative Elena's bilaterally. SCDs replaced and functioning.     Labs:                        10.7   17.69 )-----------( 241      ( 2019 20:48 )             31.8                         10.8   17.02 )-----------( 254      ( 2019 16:30 )             32.9                         11.4   14.62 )-----------( 260      ( 2019 12:02 )             34.4                         11.3   15.58 )-----------( 258      ( 2019 08:00 )             33.5                         12.3   20.54 )-----------( 275      ( 2019 04:20 )             37.3                         11.6   27.32 )-----------( 263      ( 2019 00:02 )             35.7                         10.1   13.81 )-----------( 347      ( 2019 21:37 )             31.6                         11.1   14.49 )-----------( 371      ( 2019 18:55 )             35.5     06-05    135  |  104  |  8   ----------------------------<  176<H>  4.0   |  22  |  0.75    Ca    8.0<L>      2019 08:00  Phos  1.9     06-05  Mg     2.3     06-05    TPro  7.0  /  Alb  3.8  /  TBili  0.3  /  DBili  x   /  AST  26  /  ALT  32  /  AlkPhos  50  06-04    PT/INR - ( 2019 08:00 )   PT: 12.7 SEC;   INR: 1.11          PTT - ( 2019 23:58 )  PTT:24.6 SEC      MEDICATIONS  (STANDING):  acetaminophen  IVPB .. 1000 milliGRAM(s) IV Intermittent once  chlorhexidine 4% Liquid 1 Application(s) Topical <User Schedule>  ciprofloxacin   IVPB 400 milliGRAM(s) IV Intermittent every 12 hours  HYDROmorphone PCA (1 mG/mL) 30 milliLiter(s) PCA Continuous PCA Continuous  lactated ringers. 1000 milliLiter(s) (125 mL/Hr) IV Continuous <Continuous>  lidocaine   Patch 1 Patch Transdermal every 24 hours      Assessment/Plan:   35 y/o  status post IM and intra-sac MTX on 3/1 for cervical ectopic pregnancy presenting with vaginal bleeding s/p cervical balloon insertion & b/l UAE. Remains hemodynamically stable and doing well postoperative so pt transferred from SICU to overflow bed in CTICU. Possible transfer to floor pending removal of cervical balloon in AM.   -pt hemodynamically stable. Hgb stable 10.8->10.7  -minimal bleeding from cervical berry, outpt decreasing from 55cc over the day to 10cc over last 6h. No vaginal bleeding on pad.   -UOP adequate and VSS  -appreciate excellent SICU care  -plan for possible transfer to floor pending removal of cervical berry in AM    d/w Dr. Sherly Kim PGY2  -------------------------------------------------------------------------------------------------------------

## 2019-06-06 NOTE — PROGRESS NOTE ADULT - PROBLEM SELECTOR PLAN 1
Neuro: PCA, Toradol for pain control.  CV: Hct downtrending - continue to trend and rule out continued bleeding  Pulm: Saturating well on room air. Encourage incentive spirometry  GI: NPO  : UOP adequate, continue to monitor. Cervical berry with 30cc output total - consider d/cing today  Heme: SCDs for DVT ppx. laying flat on back   Dispo: currently admitted to SICU; appreciate care. Work up drop in hct.    H Matthew PGY1

## 2019-06-06 NOTE — PROGRESS NOTE ADULT - ASSESSMENT
35 y/o  status post IM and intra-sac MTX on 3/1 for cervical ectopic pregnancy presenting with vaginal bleeding POD1 s/p cervical balloon insertion & b/l UAE. Hct dropping 31.8->27.9. SICU managing currently, GYN planning to remove cervical berry today with attending at bedside. 35 y/o  status post IM and intra-sac MTX on 3/1 for cervical ectopic pregnancy presenting with vaginal bleeding POD1 s/p cervical balloon insertion & UAE. Hct dropping 31.8->27.9. SICU managing currently, GYN planning to remove cervical berry today with attending at bedside.

## 2019-06-06 NOTE — PROGRESS NOTE ADULT - SUBJECTIVE AND OBJECTIVE BOX
Anesthesia Pain Management Service    SUBJECTIVE: Pt now off IV PCA without problems reported.    Therapy:	  [ X] IV PCA	   [ ] Epidural           [ ] s/p Spinal Opoid              [ ] Postpartum infusion	  [ ] Patient controlled regional anesthesia (PCRA)    [ ] prn Analgesics    Allergies    No Known Drug Allergies  peanuts (Anaphylaxis)  Sesame (Unknown)    Intolerances      MEDICATIONS  (STANDING):  acetaminophen   Tablet .. 975 milliGRAM(s) Oral every 6 hours  acetaminophen  IVPB .. 1000 milliGRAM(s) IV Intermittent once  ibuprofen  Tablet. 600 milliGRAM(s) Oral every 6 hours  lidocaine   Patch 1 Patch Transdermal every 24 hours    MEDICATIONS  (PRN):  oxyCODONE    IR 5 milliGRAM(s) Oral every 6 hours PRN Severe Pain (7 - 10)      OBJECTIVE:   [X] No new signs     [ ] Other:    Side Effects:  [X ] None			[ ] Other:    Assessment of Catheter Site:		[ ] Intact		[ ] Other:    ASSESSMENT/PLAN  [ ] Continue current therapy    [X ] Therapy changed to:    [ ] IV PCA       [ ] Epidural     [ X] prn Analgesics     Comments: Opioids or Adjuvent analgesics to be used at this point.    Progress Note written now but Patient was seen earlier.

## 2019-06-07 ENCOUNTER — TRANSCRIPTION ENCOUNTER (OUTPATIENT)
Age: 34
End: 2019-06-07

## 2019-06-07 VITALS
OXYGEN SATURATION: 99 % | TEMPERATURE: 99 F | SYSTOLIC BLOOD PRESSURE: 107 MMHG | RESPIRATION RATE: 16 BRPM | HEART RATE: 74 BPM | DIASTOLIC BLOOD PRESSURE: 51 MMHG

## 2019-06-07 LAB
BASOPHILS # BLD AUTO: 0.04 K/UL — SIGNIFICANT CHANGE UP (ref 0–0.2)
BASOPHILS NFR BLD AUTO: 0.3 % — SIGNIFICANT CHANGE UP (ref 0–2)
BLD GP AB SCN SERPL QL: NEGATIVE — SIGNIFICANT CHANGE UP
EOSINOPHIL # BLD AUTO: 0.08 K/UL — SIGNIFICANT CHANGE UP (ref 0–0.5)
EOSINOPHIL NFR BLD AUTO: 0.6 % — SIGNIFICANT CHANGE UP (ref 0–6)
HCT VFR BLD CALC: 28.9 % — LOW (ref 34.5–45)
HGB BLD-MCNC: 9.5 G/DL — LOW (ref 11.5–15.5)
IMM GRANULOCYTES NFR BLD AUTO: 0.4 % — SIGNIFICANT CHANGE UP (ref 0–1.5)
LYMPHOCYTES # BLD AUTO: 37.1 % — SIGNIFICANT CHANGE UP (ref 13–44)
LYMPHOCYTES # BLD AUTO: 4.88 K/UL — HIGH (ref 1–3.3)
MCHC RBC-ENTMCNC: 29 PG — SIGNIFICANT CHANGE UP (ref 27–34)
MCHC RBC-ENTMCNC: 32.9 % — SIGNIFICANT CHANGE UP (ref 32–36)
MCV RBC AUTO: 88.1 FL — SIGNIFICANT CHANGE UP (ref 80–100)
MONOCYTES # BLD AUTO: 0.79 K/UL — SIGNIFICANT CHANGE UP (ref 0–0.9)
MONOCYTES NFR BLD AUTO: 6 % — SIGNIFICANT CHANGE UP (ref 2–14)
NEUTROPHILS # BLD AUTO: 7.32 K/UL — SIGNIFICANT CHANGE UP (ref 1.8–7.4)
NEUTROPHILS NFR BLD AUTO: 55.6 % — SIGNIFICANT CHANGE UP (ref 43–77)
NRBC # FLD: 0 K/UL — SIGNIFICANT CHANGE UP (ref 0–0)
PLATELET # BLD AUTO: 237 K/UL — SIGNIFICANT CHANGE UP (ref 150–400)
PMV BLD: 10.4 FL — SIGNIFICANT CHANGE UP (ref 7–13)
RBC # BLD: 3.28 M/UL — LOW (ref 3.8–5.2)
RBC # FLD: 13.9 % — SIGNIFICANT CHANGE UP (ref 10.3–14.5)
RH IG SCN BLD-IMP: POSITIVE — SIGNIFICANT CHANGE UP
WBC # BLD: 13.16 K/UL — HIGH (ref 3.8–10.5)
WBC # FLD AUTO: 13.16 K/UL — HIGH (ref 3.8–10.5)

## 2019-06-07 PROCEDURE — 99238 HOSP IP/OBS DSCHRG MGMT 30/<: CPT

## 2019-06-07 RX ORDER — FERROUS SULFATE 325(65) MG
1 TABLET ORAL
Qty: 90 | Refills: 0
Start: 2019-06-07 | End: 2019-07-06

## 2019-06-07 RX ORDER — NORETHINDRONE AND ETHINYL ESTRADIOL 0.4-0.035
1 KIT ORAL
Qty: 28 | Refills: 3
Start: 2019-06-07 | End: 2019-09-26

## 2019-06-07 RX ADMIN — Medication 600 MILLIGRAM(S): at 15:12

## 2019-06-07 RX ADMIN — Medication 975 MILLIGRAM(S): at 15:41

## 2019-06-07 RX ADMIN — Medication 600 MILLIGRAM(S): at 15:42

## 2019-06-07 RX ADMIN — LIDOCAINE 1 PATCH: 4 CREAM TOPICAL at 01:30

## 2019-06-07 RX ADMIN — LIDOCAINE 1 PATCH: 4 CREAM TOPICAL at 11:51

## 2019-06-07 RX ADMIN — Medication 975 MILLIGRAM(S): at 15:11

## 2019-06-07 NOTE — CHART NOTE - NSCHARTNOTEFT_GEN_A_CORE
BACKTIMED TO 130p 2/2 CLINICAL DUTIES    Spoke with patient extensively about birth control for discharge. Patient only wants to take OCPs at this time and understands that they only work if taken appropriately. Declined depo or any other method of contraception. and referenced an episode where she was transferred from work to [facility] for a cardiac workup after having palpitations, and the said the doctors there attributed her symptoms to the depo shot. Patient became agitated and profane during explanation of alternative methods of contraception with easier compliance. When discussing how to be compliant with OCPs and said she was done talking and that this could be discussed with Dr. Tafoya. Patient then picked up the phone to call about her food delivery and was no longer interested in pursing this conversation. Aware that she has to follow up in clinic next week.    CURRENT TIME (430p):  Patient re-evaluated before discharge. Complaining of some soreness and in the L groin. UAE done R side. She feels that the L groin feels "harder" than the R. Groin pain improved with motrin and tylenol. No chest pain. Chest tightness much improved with ambulation and incentive spirometer use. No SOB, dyspnea, fevers, chills, headaches, lightheadedness, dizziness.  L groin examined: no redness, no tenderness to palpation, no palpable masses, no fluctuance. No calf pain bilaterally. Elena's sign negative bilaterally.   Patient ambulating, voiding, tolerating po. Stable for discharge with plans to follow up in clinic.      NICOLLE Castro PGY1  all matters discussed with Dr. Tafoya

## 2019-06-07 NOTE — DISCHARGE NOTE NURSING/CASE MANAGEMENT/SOCIAL WORK - NSDCDPATPORTLINK_GEN_ALL_CORE
You can access the [facility] Patient Portal, offered by [facility], by registering with the following website: [email or web address]

## 2019-06-07 NOTE — PROGRESS NOTE ADULT - REASON FOR ADMISSION
vaginal bleeding

## 2019-06-07 NOTE — DISCHARGE NOTE NURSING/CASE MANAGEMENT/SOCIAL WORK - NSDCFUADDAPPT_GEN_ALL_CORE_FT
Please schedule appointment in 1-2 weeks for follow up Please schedule appointment in 1-2 weeks for follow up. Come back to emergency department immediately if one or more pads become soaked per hour, or you notice large clots. Notify your health care provider of any pain not relieved by pain medication, of any fever greater than 100.4F. Please call your health care provider f any further questions. Please schedule appointment in 1-2 weeks for follow up.

## 2019-06-07 NOTE — DISCHARGE NOTE PROVIDER - CARE PROVIDER_API CALL
[facility],   [facility]  3rd floor  Oncology Building  Phone: [phone]  Fax: (   )    -  Follow Up Time:

## 2019-06-07 NOTE — PROGRESS NOTE ADULT - PROVIDER SPECIALTY LIST ADULT
Anesthesia
GYN
Intervent Radiology
Pain Medicine
SICU
SICU

## 2019-06-07 NOTE — DISCHARGE NOTE PROVIDER - PROVIDER TOKENS
FREE:[LAST:[[facility]],PHONE:[phone]],FAX:[(   )    -],ADDRESS:[[facility]  3rd floor  Oncology Building]]

## 2019-06-07 NOTE — DISCHARGE NOTE NURSING/CASE MANAGEMENT/SOCIAL WORK - NSDCPNINST_GEN_ALL_CORE
Please schedule appointment in 1-2 weeks for follow up. Come back to emergency department immediately if one or more pads become soaked per hour, or you notice large clots. Notify your health care provider of any pain not relieved by pain medication, of any fever greater than 100.4F. Please call your health care provider f any further questions.

## 2019-06-07 NOTE — PROGRESS NOTE ADULT - ATTENDING COMMENTS
34 year old female  S/P IM and intra-sac MTX on 3/1 for cervical ectopic pregnancy who presented to the ED with complaint of abnormal vaginal bleeding x4 weeks.  Patient previously admitted  with complaint of intermittent vaginal bleeding that began on  filling <2 pads per day.  During prior admission she was rescanned by ATU and no evidence of pregnancy was seen so she was discharged with plan to follow up in clinic as her bleeding was stable at that time.  Patient's bHCG has been down trending from 000337(3/1)->48(5/15)->15().  Now S/P successful embolization of bilateral uterine arteries with Avitene slurry.       PLAN:    NEUROLOGY:  - Alert and oriented  - Continue with PCA pump and IV Tylenol for pain control    RESPIRATORY:  - Saturating well on room air  - Maintain O2 saturation >92%    CARDIOVASCULAR:  - Normotensive.  No pressor requirements  - Keep MAP >65    GI / NUTRITION:  - Currently NPO    RENAL / GENITOURINARY:  - Indwelling berry catheter  - Monitor electrolytes and replete PRN  - Continue to monitor strict ins and outs q1 hour  - OB / GYN to decide when cervical balloon is removed    HEMATOLOGIC:  - Hemoglobin and hematocrit remain stable  - Continue to monitor CBC q4 hours     INFECTIOUS DISEASE:  - Currently afebrile with no leukocytosis  - Continue with IV Ciprofloxacin x3 doses    ENDOCRINOLOGY:  - No active issues  - Continue to monitor glucose on BMP (goal 140-180)      Disposition: SICU  The patient is a critical care patient with life threatening hemodynamic and metabolic instability in SICU.  I have personally interviewed when possible and examined the patient, reviewed data and laboratory tests/x-rays and all pertinent electronic images.  I was physically present for the key portions of the evaluation and management (E/M) service provided.   The SICU team has a constant risk benefit analyzes discussion with the primary team, all consultants, House Staff and PA's on all decisions.  These diagnoses are unrelated to the surgical procedure noted above.  I meet with family if needed to get further history, discuss the case and make care decisions for this patient who might not be able to participate.  Time involved in performance of separately billable procedures was not counted toward my critical care time. There is no overlap.  I spent 55-75 minutes of critical care time for the diagnoses, assessment, plan and interventions.
34 year old female  S/P IM and intra-sac MTX on 3/1 for cervical ectopic pregnancy who presented to the ED with complaint of abnormal vaginal bleeding x4 weeks.  Patient previously admitted  with complaint of intermittent vaginal bleeding that began on  filling <2 pads per day.  During prior admission she was rescanned by ATU and no evidence of pregnancy was seen so she was discharged with plan to follow up in clinic as her bleeding was stable at that time.  Patient's bHCG has been down trending from 439378(3/1)->48(5/15)->15().  Now S/P successful embolization of bilateral uterine arteries with Avitene slurry.       PLAN:    NEUROLOGY:  - Alert and oriented  - Continue with PCA pump and IV Tylenol for pain control    RESPIRATORY:  - Saturating well on room air  - Maintain O2 saturation >92%    CARDIOVASCULAR:  - Normotensive.  No pressor requirements  - Keep MAP >65    GI / NUTRITION:  - Currently NPO    RENAL / GENITOURINARY:  - Indwelling berry catheter  - Monitor electrolytes and replete PRN  - Continue to monitor strict ins and outs q1 hour    HEMATOLOGIC:  - Hemoglobin and hematocrit remain stable  - Continue to monitor CBC q8 hours   - Plan to remove cervical balloon today.     INFECTIOUS DISEASE:  - Currently afebrile with no leukocytosis  - Continue with IV Ciprofloxacin x3 doses    ENDOCRINOLOGY:  - No active issues  - Continue to monitor glucose on BMP (goal 140-180)      Disposition: Transfer to floor after removal of cervical balloon.   The patient is a critical care patient with life threatening hemodynamic and metabolic instability in SICU.  I have personally interviewed when possible and examined the patient, reviewed data and laboratory tests/x-rays and all pertinent electronic images.  I was physically present for the key portions of the evaluation and management (E/M) service provided.   The SICU team has a constant risk benefit analyzes discussion with the primary team, all consultants, House Staff and PA's on all decisions.  These diagnoses are unrelated to the surgical procedure noted above.  I meet with family if needed to get further history, discuss the case and make care decisions for this patient who might not be able to participate.  Time involved in performance of separately billable procedures was not counted toward my critical care time. There is no overlap.  I spent 55-75 minutes of critical care time for the diagnoses, assessment, plan and interventions.
Patient seen and examined by me.  Agree with above assessment and plan.  Pt feeling better with minimal to no VB and minimal cervical berry output.  SICU care appreciated
Patient seen and examined by me. Feels well; Cervical balloon removed--no to minimal blood on perineum; Rpt CBC as follows  Complete Blood Count (06.06.19 @ 11:30)    WBC Count: 14.78 K/uL    RBC Count: 3.42 M/uL    Hemoglobin: 9.8 g/dL    Hematocrit: 29.8 %    Mean Cell Volume: 87.1 fL    Mean Cell Hemoglobin: 28.7 pg    Mean Cell Hemoglobin Conc: 32.9 %    Red Cell Distrib Width: 14.1 %    Platelet Count - Automated: 230 K/uL    MPV: 10.5 fl    Nucleated RBC #: 0 K/uL     H/H stable now.  Will transfer to  and inc ambulation, advance diet and remove berry catheter.  Discussed contraception with pt.  Anticipate D/c home in am
Pt seen and examined by me.  Chest discomfort is positional and has improved.  Will discharge home if pt feels well this afternoon.  Discussed contraception with patient again. Strongly advised long term or permanent contraception such as Nexplanon or IUD or BTL.  Pt only desires OCPs.

## 2019-06-07 NOTE — DISCHARGE NOTE PROVIDER - HOSPITAL COURSE
35 y/o  status post IM and intra-sac MTX on 3/1 for cervical ectopic pregnancy admitted with heavy vaginal bleeding and unstable VS. After placement of cervical berry and UAE, patient received 3uPRBCs VS stabilized. Plan for pt to precede to IR for embolization and possible D&C/hysterectomy if bleeding continues.      POD1 s/p cervical balloon insertion & UAE. Hct stable. Patient transferred to floor yesterday and doing well. Voiding spontaneously, ambulating, and tolerating PO. Chest tightness x 2 days likely 2/2 atelectasis given benign exam and stable vitals. 35 y/o  status post IM and intra-sac MTX on 3/1 for cervical ectopic pregnancy admitted with heavy vaginal bleeding and unstable VS. After placement of cervical berry and UAE, patient received 3uPRBCs VS stabilized. Patient was admitted to the SICU for higher level of care. HD#2 no acute events. H/H stabilized. Cervical and berry catheter with appropriate output. HD#3 cervical berry removed in SICU and bleeding was appropriate. Patient transferred to floor yesterday and doing well. Voiding spontaneously, ambulating, and tolerating PO. HD#4 Patient experienced chest tightness x 2 days in the morning that improved with ambulation and incentive spirometer use; pain likely 2/2 atelectasis given benign exam and stable vitals. 33 y/o  status post IM and intra-sac MTX on 3/1 for cervical ectopic pregnancy admitted with heavy vaginal bleeding and unstable VS. After placement of cervical berry and UAE, patient received 3uPRBCs VS stabilized. Patient was admitted to the SICU for higher level of care. HD#2 no acute events. H/H stabilized. Cervical and berry catheter with appropriate output. HD#3 cervical berry removed in SICU and bleeding was appropriate. Patient transferred to floor yesterday and doing well. Voiding spontaneously, ambulating, and tolerating PO. HD#4 Patient experienced chest tightness x 2 days in the morning that improved with ambulation and incentive spirometer use; pain likely 2/2 atelectasis given benign exam and stable vitals. Patient to follow up in 1-2 weeks at clinic.

## 2019-06-07 NOTE — PROGRESS NOTE ADULT - PROBLEM SELECTOR PLAN 1
Neuro: PO pain meds  CV: hemodynamically stable  Pulm: Saturating well on room air. Taught incentive spirometry at bedside and encouraged 10x/hour  GI: regular diet  : voiding spontaneously  Heme: SCDs for DVT ppx, encourage ambulation, Hct stable   Dispo: plan for discharge late today or tomorrow    H Matthew PGY1

## 2019-06-07 NOTE — PROGRESS NOTE ADULT - ASSESSMENT
35 y/o  status post IM and intra-sac MTX on 3/1 for cervical ectopic pregnancy presenting with vaginal bleeding POD1 s/p cervical balloon insertion & UAE. Hct stable. Patient transferred to floor yesterday and doing well. Voiding spontaneously, ambulating, and tolerating PO. Chest tightness x 2 days likely 2/2 atelectasis given benign exam and stable vitals.

## 2019-06-07 NOTE — CHART NOTE - NSCHARTNOTEFT_GEN_A_CORE
R2 Event Note:   Backnote from 5:15 AM when pt seen for complaint of difficulty breathing and throat tightness upon awakening     Patient called nurse with complaint of difficulty breathing upon awakening associated with chest tightness that resolved spontaneously by the time nurse responded and took pt's VS. VS wnl hr 72, bp 114/65, 100% o2 saturation. Pt noted a mild tightness in her chest with her SOB. All symptoms resolved spontaneously. Denies diaphoresis, chest pressure, chest pain, feeling faint. Denies fever/chills, nausea/vomiting, CP/SOB on exam. Chest tightness was not reproducible and pt with no difficulty breathing on exam. During exam pt without complaints. It was noted there were no SCDs in room and nurse was notified of need for pt to have SCDs placed as soon as possible for continuous use while pt in bed. Pt otherwise doing well.      Vital Signs Last 24 Hrs  T(C): 36.8 (2019 05:00), Max: 37.3 (2019 16:45)  T(F): 98.2 (2019 05:00), Max: 99.1 (2019 16:45)  HR: 72 (2019 05:00) (72 - 100)  BP: 114/65 (2019 05:00) (100/61 - 139/88)  BP(mean): 60 (2019 15:00) (60 - 83)  RR: 18 (2019 05:00) (15 - 26)  SpO2: 100% (2019 05:00) (96% - 100%)    I&O's Detail    2019 07:01  -  2019 07:00  --------------------------------------------------------  IN:    IV PiggyBack: 450 mL    lactated ringers.: 3000 mL  Total IN: 3450 mL    OUT:    Drain: 65 mL    Indwelling Catheter - Urethral: 2885 mL  Total OUT: 2950 mL    Total NET: 500 mL      2019 07:01  -  2019 06:22  --------------------------------------------------------  IN:    IV PiggyBack: 200 mL    lactated ringers.: 750 mL  Total IN: 950 mL    OUT:    Indwelling Catheter - Urethral: 875 mL    Voided: 1100 mL  Total OUT: 1975 mL    Total NET: -1025 mL          PE:  Gen: Appears comfortable  CV: RR s1s2  Pulm: CTA b/l  Abd: Soft, appropriately tender, no rebound  Ext: NT bilateral, negative Elena's sign bilaterally     Labs:                        9.8    14.78 )-----------( 230      ( 2019 11:30 )             29.8                         9.4    18.01 )-----------( 236      ( 2019 03:20 )             27.9                         10.7   17.69 )-----------( 241      ( 2019 20:48 )             31.8                         10.8   17.02 )-----------( 254      ( 2019 16:30 )             32.9                         11.4   14.62 )-----------( 260      ( 2019 12:02 )             34.4                         11.3   15.58 )-----------( 258      ( 2019 08:00 )             33.5     06-06    140  |  106  |  7   ----------------------------<  103<H>  3.8   |  22  |  0.87    Ca    8.1<L>      2019 03:20  Phos  2.2     06-06  Mg     2.1     06-06      PT/INR - ( 2019 08:00 )   PT: 12.7 SEC;   INR: 1.11              Fibrinogen:     Lactate:     MEDICATIONS  (STANDING):  acetaminophen   Tablet .. 975 milliGRAM(s) Oral every 6 hours  acetaminophen  IVPB .. 1000 milliGRAM(s) IV Intermittent once  ibuprofen  Tablet. 600 milliGRAM(s) Oral every 6 hours  lidocaine   Patch 1 Patch Transdermal every 24 hours      Assessment/Plan: 33 y/o  status post IM and intra-sac MTX on 3/1 for cervical ectopic pregnancy presenting with vaginal bleeding POD1 s/p cervical balloon insertion, bilateral UAE and cervical balloon. Pt evaluated at bedside for complaint of difficulty breathing and chest tightness upon awakening with complete resolution within minutes  -Pt without complaints upon evaluation with no SOB or chest tightness. Given VS wnl and O2 sat 100% low concern for acute cardiac or respiratory etiology and no further work up required at this time  -If symptoms recur consider EKG and cardiac enzymes and CXR  -SCDs to be replaced immediately for DVT ppx   -Pt to be seen and reevaluated shortly by GYN team and if further symptoms may discuss further w/u and treatment   -Pt counselled on importance of contraception prior to discharge from hospital; pt agreeable to contraception and understand need for contraceptive plan prior to discharge     d/w Dr. Vipul Kim PGY2  -------------------------------------------------------------------------------------------------------------

## 2019-06-07 NOTE — PROGRESS NOTE ADULT - SUBJECTIVE AND OBJECTIVE BOX
Patient seen and examined at bedside. Patient complaining of chest tightness that she says started 2 days ago. Pain well controlled. Patient is ambulating, passing flatus, voiding spontaneously, and tolerating regular diet. Denies CP, SOB, N/V, fevers, and chills.    Vital Signs Last 24 Hours  T(C): 36.8 (06-07-19 @ 05:00), Max: 37.3 (06-06-19 @ 16:45)  HR: 72 (06-07-19 @ 05:00) (72 - 100)  BP: 114/65 (06-07-19 @ 05:00) (100/61 - 139/88)  RR: 18 (06-07-19 @ 05:00) (15 - 26)  SpO2: 100% (06-07-19 @ 05:00) (96% - 100%)    I&O's Detail    05 Jun 2019 07:01  -  06 Jun 2019 07:00  --------------------------------------------------------  IN:    IV PiggyBack: 450 mL    lactated ringers.: 3000 mL  Total IN: 3450 mL    OUT:    Drain: 65 mL    Indwelling Catheter - Urethral: 2885 mL  Total OUT: 2950 mL    Total NET: 500 mL      06 Jun 2019 07:01  -  07 Jun 2019 06:38  --------------------------------------------------------  IN:    IV PiggyBack: 200 mL    lactated ringers.: 750 mL  Total IN: 950 mL    OUT:    Indwelling Catheter - Urethral: 875 mL    Voided: 1500 mL  Total OUT: 2375 mL    Total NET: -1425 mL      Physical Exam:  General: NAD  CV: NR, RR, S1, S2, no M/R/G  Lungs: CTA-B  Abdomen: Soft, non-tender, non-distended, +BS  UAE site CDI  Ext: No pain or swelling. SCDs on.    Labs:             9.5<L>  13.16<H> )-----------( 237      ( 06-07 @ 05:17 )             28.9<L>               9.8<L>  14.78<H> )-----------( 230      ( 06-06 @ 11:30 )             29.8<L>               9.4<L>  18.01<H> )-----------( 236      ( 06-06 @ 03:20 )             27.9<L>               10.7<L>  17.69<H> )-----------( 241      ( 06-05 @ 20:48 )             31.8<L>               10.8<L>  17.02<H> )-----------( 254      ( 06-05 @ 16:30 )             32.9<L>        MEDICATIONS  (STANDING):  acetaminophen   Tablet .. 975 milliGRAM(s) Oral every 6 hours  acetaminophen  IVPB .. 1000 milliGRAM(s) IV Intermittent once  ibuprofen  Tablet. 600 milliGRAM(s) Oral every 6 hours  lidocaine   Patch 1 Patch Transdermal every 24 hours    MEDICATIONS  (PRN):  oxyCODONE    IR 5 milliGRAM(s) Oral every 6 hours PRN Severe Pain (7 - 10) Patient seen and examined at bedside. Patient complaining of chest tightness that she says started 2 days ago. Pain well controlled. Patient is ambulating, passing flatus, voiding spontaneously, and tolerating regular diet. Denies CP, SOB, N/V, fevers, and chills.    Vital Signs Last 24 Hours  T(C): 36.8 (06-07-19 @ 05:00), Max: 37.3 (06-06-19 @ 16:45)  HR: 72 (06-07-19 @ 05:00) (72 - 100)  BP: 114/65 (06-07-19 @ 05:00) (100/61 - 139/88)  RR: 18 (06-07-19 @ 05:00) (15 - 26)  SpO2: 100% (06-07-19 @ 05:00) (96% - 100%)    I&O's Detail    05 Jun 2019 07:01  -  06 Jun 2019 07:00  --------------------------------------------------------  IN:    IV PiggyBack: 450 mL    lactated ringers.: 3000 mL  Total IN: 3450 mL    OUT:    Drain: 65 mL    Indwelling Catheter - Urethral: 2885 mL  Total OUT: 2950 mL    Total NET: 500 mL      06 Jun 2019 07:01  -  07 Jun 2019 06:38  --------------------------------------------------------  IN:    IV PiggyBack: 200 mL    lactated ringers.: 750 mL  Total IN: 950 mL    OUT:    Indwelling Catheter - Urethral: 875 mL    Voided: 1500 mL  Total OUT: 2375 mL    Total NET: -1425 mL      Physical Exam:  General: NAD  CV: NR, RR, S1, S2, no M/R/G  Lungs: CTA-B  Abdomen: Soft, non-tender, non-distended, +BS  UAE site CDI  Ext: No pain or swelling. SCDs on. DPs 2+ b/l    Labs:             9.5<L>  13.16<H> )-----------( 237      ( 06-07 @ 05:17 )             28.9<L>               9.8<L>  14.78<H> )-----------( 230      ( 06-06 @ 11:30 )             29.8<L>               9.4<L>  18.01<H> )-----------( 236      ( 06-06 @ 03:20 )             27.9<L>               10.7<L>  17.69<H> )-----------( 241      ( 06-05 @ 20:48 )             31.8<L>               10.8<L>  17.02<H> )-----------( 254      ( 06-05 @ 16:30 )             32.9<L>        MEDICATIONS  (STANDING):  acetaminophen   Tablet .. 975 milliGRAM(s) Oral every 6 hours  acetaminophen  IVPB .. 1000 milliGRAM(s) IV Intermittent once  ibuprofen  Tablet. 600 milliGRAM(s) Oral every 6 hours  lidocaine   Patch 1 Patch Transdermal every 24 hours    MEDICATIONS  (PRN):  oxyCODONE    IR 5 milliGRAM(s) Oral every 6 hours PRN Severe Pain (7 - 10)

## 2019-06-10 PROBLEM — O00.90 UNSPECIFIED ECTOPIC PREGNANCY WITHOUT INTRAUTERINE PREGNANCY: Chronic | Status: ACTIVE | Noted: 2019-06-04

## 2019-06-20 ENCOUNTER — APPOINTMENT (OUTPATIENT)
Dept: OBGYN | Facility: HOSPITAL | Age: 34
End: 2019-06-20

## 2020-02-01 ENCOUNTER — OUTPATIENT (OUTPATIENT)
Dept: OUTPATIENT SERVICES | Facility: HOSPITAL | Age: 35
LOS: 1 days | End: 2020-02-01
Payer: MEDICAID

## 2020-02-01 DIAGNOSIS — Z98.890 OTHER SPECIFIED POSTPROCEDURAL STATES: Chronic | ICD-10-CM

## 2020-02-06 NOTE — ED PROVIDER NOTE - MUSCULOSKELETAL, MLM
Spine appears normal, range of motion is not limited, no muscle or joint tenderness
no vascular compromise

## 2020-03-01 PROCEDURE — G9005: CPT

## 2020-03-01 PROCEDURE — G9001: CPT

## 2020-04-01 ENCOUNTER — OUTPATIENT (OUTPATIENT)
Dept: OUTPATIENT SERVICES | Facility: HOSPITAL | Age: 35
LOS: 1 days | End: 2020-04-01
Payer: MEDICAID

## 2020-04-01 DIAGNOSIS — Z98.890 OTHER SPECIFIED POSTPROCEDURAL STATES: Chronic | ICD-10-CM

## 2020-04-29 DIAGNOSIS — Z71.89 OTHER SPECIFIED COUNSELING: ICD-10-CM

## 2020-05-01 PROCEDURE — G9005: CPT

## 2020-08-03 DIAGNOSIS — Z71.89 OTHER SPECIFIED COUNSELING: ICD-10-CM

## 2020-10-01 ENCOUNTER — OUTPATIENT (OUTPATIENT)
Dept: OUTPATIENT SERVICES | Facility: HOSPITAL | Age: 35
LOS: 1 days | End: 2020-10-01
Payer: MEDICAID

## 2020-10-01 DIAGNOSIS — Z98.890 OTHER SPECIFIED POSTPROCEDURAL STATES: Chronic | ICD-10-CM

## 2020-10-16 ENCOUNTER — EMERGENCY (EMERGENCY)
Facility: HOSPITAL | Age: 35
LOS: 1 days | Discharge: HOME | End: 2020-10-16
Attending: EMERGENCY MEDICINE | Admitting: EMERGENCY MEDICINE
Payer: MEDICAID

## 2020-10-16 VITALS
OXYGEN SATURATION: 100 % | TEMPERATURE: 98 F | SYSTOLIC BLOOD PRESSURE: 151 MMHG | HEIGHT: 63 IN | DIASTOLIC BLOOD PRESSURE: 90 MMHG | RESPIRATION RATE: 18 BRPM | HEART RATE: 96 BPM

## 2020-10-16 DIAGNOSIS — Z98.890 OTHER SPECIFIED POSTPROCEDURAL STATES: Chronic | ICD-10-CM

## 2020-10-16 PROCEDURE — 99283 EMERGENCY DEPT VISIT LOW MDM: CPT

## 2020-10-16 RX ORDER — CEPHALEXIN 500 MG
1 CAPSULE ORAL
Qty: 20 | Refills: 0
Start: 2020-10-16 | End: 2020-10-25

## 2020-10-16 NOTE — ED PROVIDER NOTE - NSFOLLOWUPINSTRUCTIONS_ED_ALL_ED_FT
You were seen and evaluated today for pain in the axilla likely caused by cellulitis which is a superficial skin infection.   - You were prescribed antibiotics, please pick them up as soon as possible and take as directed on the bottle  - Follow up with your primary care physician within 1-2 weeks  - Keep area clean and dry    Return to the ED should you experience any of the following:  -Fever  -Drainage from the armpit   -Any new concerning symptoms

## 2020-10-16 NOTE — ED PROVIDER NOTE - CLINICAL SUMMARY MEDICAL DECISION MAKING FREE TEXT BOX
35F no significant PMH CC abscess in right axilla for past three weeks. DDx: hidradenitis suppurativa, abscess, Plan: Drainage and dressing of wound

## 2020-10-16 NOTE — ED PROVIDER NOTE - ATTENDING CONTRIBUTION TO CARE
Alexandra: I have reviewed and discussed the medical student’s documentation and findings with the student. After personally examining the patient, my findings have been added to this documentation.

## 2020-10-16 NOTE — ED PROVIDER NOTE - OBJECTIVE STATEMENT
35F no significant PMH CC abscess in right axilla. Pt has had this present for the past 3 weeks, and since two days ago it started having thick yellow drainage, no blood noted. Never had symptoms like this before, has been applying neosporin to the area. Painful to touch and also movement. No Fevers, cough, CP, SOB. No medications.

## 2020-10-16 NOTE — ED ADULT TRIAGE NOTE - CHIEF COMPLAINT QUOTE
Pt c/o draining abscess to right upper arm x 3 weeks. Denies fever, c/o pain. Pt also states she's been having pain in the left thigh after having laparoscopic surgery a year ago for ectopic pregnancy, requesting an xray.

## 2020-10-16 NOTE — ED PROVIDER NOTE - SKIN WOUND TYPE
abscess(s)/Abscess located in right axilla, with drainage, approx 4.5cm x 4.5cm, slight erythema around abscess, No streaking proximal or distal to wound

## 2020-10-16 NOTE — ED PROVIDER NOTE - PATIENT PORTAL LINK FT
You can access the FollowMyHealth Patient Portal offered by [facility] by registering at the following website: [email or web address]. By joining [employer]’s FollowMyHealth portal, you will also be able to view your health information using other applications (apps) compatible with our system.

## 2020-10-21 DIAGNOSIS — Z71.89 OTHER SPECIFIED COUNSELING: ICD-10-CM

## 2020-12-01 PROCEDURE — G9005: CPT

## 2020-12-16 PROBLEM — Z01.419 ENCOUNTER FOR GYNECOLOGICAL EXAMINATION WITHOUT ABNORMAL FINDING: Status: RESOLVED | Noted: 2018-03-30 | Resolved: 2020-12-16

## 2021-01-08 ENCOUNTER — EMERGENCY (EMERGENCY)
Facility: HOSPITAL | Age: 36
LOS: 1 days | Discharge: HOME | End: 2021-01-08
Admitting: EMERGENCY MEDICINE
Payer: MEDICAID

## 2021-01-08 VITALS
OXYGEN SATURATION: 100 % | HEIGHT: 63 IN | RESPIRATION RATE: 14 BRPM | SYSTOLIC BLOOD PRESSURE: 147 MMHG | HEART RATE: 98 BPM | TEMPERATURE: 98 F | DIASTOLIC BLOOD PRESSURE: 74 MMHG

## 2021-01-08 DIAGNOSIS — Z98.890 OTHER SPECIFIED POSTPROCEDURAL STATES: Chronic | ICD-10-CM

## 2021-01-08 PROCEDURE — 99283 EMERGENCY DEPT VISIT LOW MDM: CPT

## 2021-01-08 RX ORDER — FAMOTIDINE 10 MG/ML
20 INJECTION INTRAVENOUS ONCE
Refills: 0 | Status: COMPLETED | OUTPATIENT
Start: 2021-01-08 | End: 2021-01-08

## 2021-01-08 RX ADMIN — Medication 40 MILLIGRAM(S): at 23:43

## 2021-01-08 RX ADMIN — FAMOTIDINE 20 MILLIGRAM(S): 10 INJECTION INTRAVENOUS at 23:43

## 2021-01-08 NOTE — ED ADULT TRIAGE NOTE - CHIEF COMPLAINT QUOTE
pt reports had eyebrow tinting done 2 days ago. Reports since yesterday has been having swelling, burning and pain to the eyebrow area and the right side of cheek. no redness noted. denies sob, trouble swallowing, itching.

## 2021-01-08 NOTE — ED PROVIDER NOTE - NSFOLLOWUPINSTRUCTIONS_ED_ALL_ED_FT
Follow up with your PMD within 48-72 hours.  Show copies of your labs provided.  Take Prednisone 40mg daily for 4 days, Pepcid 20mg 2x/day for 4 days, Benadryl 25mg every 8 hours for 4 days- caution drowsiness/do not drive. Worsening or new shortness of breath, tightness in your throat, swelling, chest pain, fever, chills, return to the ER

## 2021-01-08 NOTE — ED PROVIDER NOTE - CLINICAL SUMMARY MEDICAL DECISION MAKING FREE TEXT BOX
pt with allergic reaction to hair dye, appears noninfectious, no airway involvement  -benadryl, pepcid, steroids

## 2021-01-08 NOTE — ED PROVIDER NOTE - OBJECTIVE STATEMENT
34 y/o F no PMH c/o pruritic rash to b/l eyebrows and extending to right side of face x 2 days. Pt states she had her eye brows dyed 2 days ago and reaction occurred shortly after. today rash developed some blistering which is weeping clear fluid. Pt has not taken anything for her sxs. Denies fever, chills, pain, tongue/airway itching/swelling, sob, cough, abdominal pain, n/v.

## 2021-01-08 NOTE — ED PROVIDER NOTE - PATIENT PORTAL LINK FT
You can access the FollowMyHealth Patient Portal offered by [facility] by registering at the following website: [email or web address]. By joining [employer]’s FollowMyHealth portal, you will also be able to view your health information using other applications (apps) compatible with our system.

## 2021-01-08 NOTE — ED PROVIDER NOTE - SKIN LATERALITY #1
erythematous rash with some blistering/weeping clear fluid/crusted over, not hot to touch, nontender mild erythema and papules extend from right eyebrow to right upper latera; face, no eye or ear involvement

## 2021-02-13 ENCOUNTER — EMERGENCY (EMERGENCY)
Facility: HOSPITAL | Age: 36
LOS: 0 days | Discharge: HOME | End: 2021-02-14
Attending: EMERGENCY MEDICINE
Payer: MEDICAID

## 2021-02-13 VITALS
HEART RATE: 102 BPM | OXYGEN SATURATION: 100 % | SYSTOLIC BLOOD PRESSURE: 128 MMHG | WEIGHT: 285.06 LBS | RESPIRATION RATE: 18 BRPM | TEMPERATURE: 100 F | HEIGHT: 63 IN | DIASTOLIC BLOOD PRESSURE: 83 MMHG

## 2021-02-13 DIAGNOSIS — F41.9 ANXIETY DISORDER, UNSPECIFIED: ICD-10-CM

## 2021-02-13 DIAGNOSIS — R23.8 OTHER SKIN CHANGES: ICD-10-CM

## 2021-02-13 DIAGNOSIS — Z91.018 ALLERGY TO OTHER FOODS: ICD-10-CM

## 2021-02-13 DIAGNOSIS — F32.9 MAJOR DEPRESSIVE DISORDER, SINGLE EPISODE, UNSPECIFIED: ICD-10-CM

## 2021-02-13 DIAGNOSIS — Z91.010 ALLERGY TO PEANUTS: ICD-10-CM

## 2021-02-13 DIAGNOSIS — Z98.890 OTHER SPECIFIED POSTPROCEDURAL STATES: Chronic | ICD-10-CM

## 2021-02-13 PROCEDURE — 99283 EMERGENCY DEPT VISIT LOW MDM: CPT

## 2021-02-13 NOTE — ED ADULT NURSE NOTE - OBJECTIVE STATEMENT
pt alert and oriented presents to the ED with c/o allergic reaction to hair dye. got hair done 2 days ago. c/o "pus coming from scalp," rash on forehead, and face swelling. taking benadryl q4hr. last dose of benadryl at 5PM. c/o irritation and itching. denies: difficulty breathing, throat closing. pt speaking on phone in triage without difficulty

## 2021-02-13 NOTE — ED ADULT TRIAGE NOTE - CHIEF COMPLAINT QUOTE
c/o allergic reaction to hair dye. got hair done 2 days ago. c/o "pus coming from scalp," rash on forehead, and face swelling. taking benadryl q4hr. last dose of benadryl at 5PM. c/o irritation and itching. denies: difficulty breathing, throat closing. pt speaking on phone in triage without difficulty

## 2021-02-14 VITALS — HEART RATE: 79 BPM | TEMPERATURE: 99 F | OXYGEN SATURATION: 97 % | RESPIRATION RATE: 19 BRPM

## 2021-02-14 RX ORDER — EPINEPHRINE 0.3 MG/.3ML
0.3 INJECTION INTRAMUSCULAR; SUBCUTANEOUS
Qty: 2 | Refills: 0
Start: 2021-02-14

## 2021-02-14 RX ORDER — CEPHALEXIN 500 MG
1 CAPSULE ORAL
Qty: 21 | Refills: 0
Start: 2021-02-14 | End: 2021-02-20

## 2021-02-14 RX ORDER — CEPHALEXIN 500 MG
500 CAPSULE ORAL ONCE
Refills: 0 | Status: COMPLETED | OUTPATIENT
Start: 2021-02-14 | End: 2021-02-14

## 2021-02-14 RX ORDER — IBUPROFEN 200 MG
600 TABLET ORAL ONCE
Refills: 0 | Status: COMPLETED | OUTPATIENT
Start: 2021-02-14 | End: 2021-02-14

## 2021-02-14 RX ORDER — IBUPROFEN 200 MG
1 TABLET ORAL
Qty: 28 | Refills: 0
Start: 2021-02-14 | End: 2021-02-20

## 2021-02-14 RX ADMIN — Medication 50 MILLIGRAM(S): at 00:19

## 2021-02-14 RX ADMIN — Medication 500 MILLIGRAM(S): at 00:19

## 2021-02-14 RX ADMIN — Medication 600 MILLIGRAM(S): at 00:19

## 2021-02-14 NOTE — ED PROVIDER NOTE - PATIENT PORTAL LINK FT
You can access the FollowMyHealth Patient Portal offered by [facility] by registering at the following website: [email or web address]. By joining [employer]’s FollowMyHealth portal, you will also be able to view your health information using other applications (apps) compatible with our system.

## 2021-02-14 NOTE — ED PROVIDER NOTE - ENMT, MLM
Airway patent.   Scalp: diffuse edema to hairline. No erythema. No discharge. No tongue or lip swelling. No vesicles.

## 2021-02-14 NOTE — ED PROVIDER NOTE - CLINICAL SUMMARY MEDICAL DECISION MAKING FREE TEXT BOX
DDx: Chemical irritation / reaction. No signs of infection.   Plan: Steroids, Pain control, Antibiotics to prevent infection. Patient has already removed dye and weave. Irritants have been removed.

## 2021-02-14 NOTE — ED PROVIDER NOTE - OBJECTIVE STATEMENT
36 y/o F with a PMHx of Anxiety and Depression presents to the ED c/o scalp irration associated with swelling and pain s/p getting her hair done 2 days ago. Patient's hairdresser placed a dye and chemical treatment which caused the irritation to her scalp. Denies lip/tongue swelling, rash anywhere else, fever or difficulty breathing.

## 2021-10-23 ENCOUNTER — EMERGENCY (EMERGENCY)
Facility: HOSPITAL | Age: 36
LOS: 1 days | Discharge: HOME | End: 2021-10-23
Attending: EMERGENCY MEDICINE | Admitting: EMERGENCY MEDICINE
Payer: MEDICAID

## 2021-10-23 VITALS
HEART RATE: 91 BPM | SYSTOLIC BLOOD PRESSURE: 160 MMHG | TEMPERATURE: 98 F | OXYGEN SATURATION: 99 % | DIASTOLIC BLOOD PRESSURE: 96 MMHG | HEIGHT: 63 IN | RESPIRATION RATE: 18 BRPM

## 2021-10-23 DIAGNOSIS — Z98.890 OTHER SPECIFIED POSTPROCEDURAL STATES: Chronic | ICD-10-CM

## 2021-10-23 PROCEDURE — 99053 MED SERV 10PM-8AM 24 HR FAC: CPT

## 2021-10-23 PROCEDURE — 73060 X-RAY EXAM OF HUMERUS: CPT | Mod: 26,RT

## 2021-10-23 PROCEDURE — 73090 X-RAY EXAM OF FOREARM: CPT | Mod: 26,RT

## 2021-10-23 PROCEDURE — 99284 EMERGENCY DEPT VISIT MOD MDM: CPT

## 2021-10-23 RX ORDER — IBUPROFEN 200 MG
600 TABLET ORAL ONCE
Refills: 0 | Status: COMPLETED | OUTPATIENT
Start: 2021-10-23 | End: 2021-10-23

## 2021-10-23 RX ADMIN — Medication 600 MILLIGRAM(S): at 00:38

## 2021-10-23 NOTE — ED ADULT TRIAGE NOTE - NS ED NURSE BANDS TYPE
Name band;
Crutch training provided, pt demonstrated proper use./Ambulatory with cane/crutches/walker

## 2021-10-23 NOTE — ED ADULT TRIAGE NOTE - CHIEF COMPLAINT QUOTE
Pt c/o left arm pain s/p moving tree branch yesterday. + ROM, + sensation. Denies pmhx/medications. Pt c/o left arm pain s/p moving tree branch yesterday. + ROM, + sensation. Denies chest pain, sob, pmhx/medications.

## 2021-10-23 NOTE — ED PROVIDER NOTE - MUSCULOSKELETAL [-], MLM
Detail Level: Detailed
no back pain/no joint pain/no neck pain/no calf pain/no limited range of motion

## 2021-10-23 NOTE — ED ADULT TRIAGE NOTE - RESPIRATORY RATE (BREATHS/MIN)
Last dispensed 05/08/2020  
Routing refill request to provider for review/approval because:  Medication is reported/historical    Wanda MEREDITH RN, BSN            
18

## 2021-10-23 NOTE — ED PROVIDER NOTE - PATIENT PORTAL LINK FT
You can access the FollowMyHealth Patient Portal offered by [facility] by registering at the following website: [email or web address]. By joining [employer]’s FollowMyHealth portal, you will also be able to view your health information using other applications (apps) compatible with our system.

## 2021-10-23 NOTE — ED PROVIDER NOTE - NSFOLLOWUPINSTRUCTIONS_ED_ALL_ED_FT
Strain    A strain is a stretch or tear in one of the muscles in your body. This is caused by an injury to the area such as a twisting mechanism. Symptoms include pain, swelling, or bruising. Rest that area over the next several days and slowly resume activity when tolerated. Ice can help with swelling and pain.     SEEK IMMEDIATE MEDICAL CARE IF YOU HAVE ANY OF THE FOLLOWING SYMPTOMS: worsening pain, inability to move that body part, numbness or tingling.     Take 400 mg ibuprofen every 8 hours for pain

## 2021-10-23 NOTE — ED PROVIDER NOTE - OBJECTIVE STATEMENT
36 year old female presents with right arm pain after dragging a large branch 1 day ago. pain is located in forearm and upper arm. no direct trauma.  normal  strength.  no laceration .no other complaints.  took no meds prior to arrival. pain is described as achey

## 2021-10-23 NOTE — ED PROVIDER NOTE - MUSCULOSKELETAL, MLM
Spine appears normal, range of motion is not limited, no joint tenderness. ttp mid forearm and midd upper arm. sensation and pulses intact to right arm

## 2022-07-22 ENCOUNTER — EMERGENCY (EMERGENCY)
Facility: HOSPITAL | Age: 37
LOS: 1 days | Discharge: HOME | End: 2022-07-22
Attending: EMERGENCY MEDICINE | Admitting: EMERGENCY MEDICINE

## 2022-07-22 VITALS
TEMPERATURE: 98 F | HEIGHT: 63 IN | RESPIRATION RATE: 18 BRPM | SYSTOLIC BLOOD PRESSURE: 127 MMHG | OXYGEN SATURATION: 99 % | DIASTOLIC BLOOD PRESSURE: 66 MMHG | HEART RATE: 75 BPM

## 2022-07-22 DIAGNOSIS — Z98.890 OTHER SPECIFIED POSTPROCEDURAL STATES: Chronic | ICD-10-CM

## 2022-07-22 PROCEDURE — 99283 EMERGENCY DEPT VISIT LOW MDM: CPT

## 2022-07-22 RX ADMIN — Medication 1 TABLET(S): at 14:27

## 2022-07-22 NOTE — ED PROVIDER NOTE - CLINICAL SUMMARY MEDICAL DECISION MAKING FREE TEXT BOX
37 Y F presenting with rash over R. eyebrow after chemical peel 3 days ago, primary concern for serous bulla vs. impetigo, treat with augmentin x10 days, discuss return precautions

## 2022-07-22 NOTE — ED PROVIDER NOTE - PATIENT PORTAL LINK FT
You can access the FollowMyHealth Patient Portal offered by [facility] by registering at the following website: [email or web address]. By joining "[employer]’s FollowMyHealth portal, you will also be able to view your health information using other applications (apps) compatible with our system.

## 2022-07-22 NOTE — ED PROVIDER NOTE - PHYSICAL EXAMINATION
General: WN/WD NAD  Head: Atraumatic  Eyes: EOM grossly in tact, no scleral icterus  ENT: moist mucous membranes, + tonsil stone L. side, B/L cerumen vs. bulla  Neurology: A&Ox3, nonfocal, BROOKS x 4  Respiratory: normal respiratory effort  CV: Extremities warm and well perfused  Abdominal: Soft, non-distended  Extremities: No edema  Skin: + bulla over R. eyebrow, [place], L. dermatitis over eyebrow

## 2022-07-22 NOTE — ED ADULT TRIAGE NOTE - CHIEF COMPLAINT QUOTE
Patient has c/o allergic reaction after she had her eyebrows done two days ago. Pt has swelling to eye, ear swelling/pain. Throat feeling scratch.y
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
## 2022-07-22 NOTE — ED PROVIDER NOTE - NS ED ROS FT
GENERAL: No fever or chills  EYES: No change in vision  HEENT: No trouble swallowing or speaking  CARDIAC: No chest pain  PULMONARY: No cough or SOB  GI: No abdominal pain, no nausea or no vomiting, no diarrhea or constipation  : No changes in urination  SKIN: + rash over B/L eyebrows  NEURO: No headache, no numbness  MSK: No joint pain  Otherwise as HPI or negative.

## 2022-07-23 ENCOUNTER — EMERGENCY (EMERGENCY)
Facility: HOSPITAL | Age: 37
LOS: 1 days | Discharge: HOME | End: 2022-07-23
Attending: EMERGENCY MEDICINE | Admitting: EMERGENCY MEDICINE

## 2022-07-23 VITALS
HEART RATE: 88 BPM | SYSTOLIC BLOOD PRESSURE: 137 MMHG | RESPIRATION RATE: 16 BRPM | OXYGEN SATURATION: 100 % | HEIGHT: 63 IN | TEMPERATURE: 98 F | DIASTOLIC BLOOD PRESSURE: 71 MMHG

## 2022-07-23 VITALS
OXYGEN SATURATION: 100 % | DIASTOLIC BLOOD PRESSURE: 76 MMHG | HEART RATE: 82 BPM | RESPIRATION RATE: 20 BRPM | SYSTOLIC BLOOD PRESSURE: 144 MMHG

## 2022-07-23 DIAGNOSIS — Z98.890 OTHER SPECIFIED POSTPROCEDURAL STATES: Chronic | ICD-10-CM

## 2022-07-23 PROCEDURE — 99284 EMERGENCY DEPT VISIT MOD MDM: CPT

## 2022-07-23 RX ORDER — EPINEPHRINE 0.3 MG/.3ML
0.3 INJECTION INTRAMUSCULAR; SUBCUTANEOUS ONCE
Refills: 0 | Status: COMPLETED | OUTPATIENT
Start: 2022-07-23 | End: 2022-07-23

## 2022-07-23 RX ORDER — FAMOTIDINE 10 MG/ML
20 INJECTION INTRAVENOUS ONCE
Refills: 0 | Status: COMPLETED | OUTPATIENT
Start: 2022-07-23 | End: 2022-07-23

## 2022-07-23 RX ORDER — DEXAMETHASONE 0.5 MG/5ML
6 ELIXIR ORAL ONCE
Refills: 0 | Status: COMPLETED | OUTPATIENT
Start: 2022-07-23 | End: 2022-07-23

## 2022-07-23 RX ORDER — DIPHENHYDRAMINE HCL 50 MG
50 CAPSULE ORAL ONCE
Refills: 0 | Status: COMPLETED | OUTPATIENT
Start: 2022-07-23 | End: 2022-07-23

## 2022-07-23 RX ADMIN — EPINEPHRINE 0.3 MILLIGRAM(S): 0.3 INJECTION INTRAMUSCULAR; SUBCUTANEOUS at 06:29

## 2022-07-23 RX ADMIN — Medication 6 MILLIGRAM(S): at 06:40

## 2022-07-23 RX ADMIN — Medication 50 MILLIGRAM(S): at 06:41

## 2022-07-23 RX ADMIN — FAMOTIDINE 20 MILLIGRAM(S): 10 INJECTION INTRAVENOUS at 06:41
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
## 2022-07-23 NOTE — ED PROVIDER NOTE - NS ED ROS FT
Gen: Denies fever, weight loss  HEENT: Denies vision changes, ear pain, epistaxis, sore throat  CV: Denies chest pain, palpitations  Skin: Denies erythema, color changes; +rash; +facial swelling  Resp: Denies cough; +SOB  Endo: Denies sensitivity to heat, cold, increased urination  GI: Denies  constipation, vomiting, diarrhea; +abdominal pain, +nausea  Msk: Denies back pain, LE swelling, extremity pain  : Denies dysuria, increased frequency  Neuro: Denies LOC, weakness, numbness, tingling  Psych: Denies hx of psych, hallucinations  ROS statement: all other ROS negative except as per HPI
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
## 2022-07-23 NOTE — ED PROVIDER NOTE - OBJECTIVE STATEMENT
36 y/o, no pertinent PMH, presents to ED for allergic reaction. Pt reports that she was seen in the ED yesterday for allergic reaction over b/l eyebrows after getting my eyebrows tinted/dyed. Pt was treated for allergic reaction and sent home w/ Augmentin for presumed impetigo. Pt reports now woke up ?4:30am w/ worsening rash/swelling to b/l eyes, "throat closing sensation", SOB, abd pain, and nausea. Pt denies CP, HA, vision changes, vomiting, diarrhea, weakness/numbness, lightheadedness/dizziness, or any other symptoms at this time.

## 2022-07-23 NOTE — ED ADULT NURSE NOTE - HOW OFTEN DO YOU HAVE A DRINK CONTAINING ALCOHOL?
Call pharmacy and give OK for early refill on both Morphine for July 22 and also for Buproprion XL early Never

## 2022-07-23 NOTE — ED ADULT TRIAGE NOTE - CHIEF COMPLAINT QUOTE
Pt was seen here yesterday, dc in the morning, was given amoxicillin for b/l eyebrow rash. States at 4AM woke up with left eye swelling and itchy throat, and right ear swelling worse. PMH anxiety, depression

## 2022-07-23 NOTE — ED PROVIDER NOTE - PHYSICAL EXAMINATION
PHYSICAL EXAM:  GENERAL: non-toxic appearing; in no respiratory distress  HEENT: Atraumatic, Normocephalic, PERRL, EOMs intact b/l w/out deficits, no conjunctival pallor, MMM; +b/l periorbital swelling; no tongue edema  NECK: No JVD; FROM  CHEST/LUNG: CTAB no wheezes/rhonchi/rales  HEART: RRR no murmur/gallops/rubs  ABDOMEN: +BS, soft, ND, mild diffuse ttp  EXTREMITIES: No LE edema, +2 radial pulses b/l, +2 DP/PT pulses b/l  MUSCULOSKELETAL: FROM of all 4 extremities  NERVOUS SYSTEM:  A&Ox3, No motor deficits or sensory deficits; CNII-XII intact; no focal neurologic deficits  SKIN:  No new rashes

## 2022-07-23 NOTE — ED PROVIDER NOTE - PATIENT PORTAL LINK FT
You can access the FollowMyHealth Patient Portal offered by [facility] by registering at the following website: [email or web address]. By joining [employer]’s FollowMyHealth portal, you will also be able to view your health information using other applications (apps) compatible with our system.

## 2022-07-23 NOTE — ED ADULT NURSE REASSESSMENT NOTE - NS ED NURSE REASSESS COMMENT FT1
As IVP meds ordered by MD were being administered pt stated "I can't breathe." MD at bedside at this time. Pt's HR went up to 165 bpm. MD assessed pt and educated her on the effects of the epi injection she had just received. RN and MD remained at bedside to monitor pt. Pt's HR went back down to the 90s. MD informed pt on the need to stay in the ED for observation due to this being her second visit for the same issue. Pt is agreeable to this plan.   Pt has called RN to bedside three times since then stating she feels palpitations. RN monitored pt and watched her HR go back down to the 90s each time. Pt's spo2 always remained WNL on RA. As IVP meds ordered by MD were being administered pt stated "I can't breathe." MD at bedside at this time. Pt's HR went up to 165 bpm. MD assessed pt and educated her on the effects of the epi injection she had just received. RN and MD remained at bedside to monitor pt. Pt's HR went back down to the 90s. MD informed pt on the need to stay in the ED for observation due to this being her second visit for the same issue. Pt is agreeable to this plan.   Pt has called RN to bedside three times since then stating she feels palpitations. RN monitored pt and watched her HR go back down to the 90s each time. Pt's spo2 always remained WNL on RA.   MD called to bedside to reassure pt.

## 2022-07-23 NOTE — ED ADULT NURSE NOTE - OBJECTIVE STATEMENT
Saleem RN: Pt received in rm 6. C/o allergic reaction. Pt arrived with swelling on right eye, right ear pain, and itchy throat. Pt was seen here yesterday for b/l eyebrow rash, was given amoxicillin in the morning and she took another dose 5PM in the afternoon. At 4:30AM, she woke up with the swelling and throat discomfort. Endorses mild SOB, nausea, and abd pain. A&Ox4, ambulatory at baseline. Breathing even and unlabored, spo2 100% on room air, NSR on the cardiac monitor. Able to speak in complete sentences, no drooling noted. 20G IV placed by primary RN on right hand. Labs drawn. Pt medicated per MAR. Will reassess.

## 2022-07-23 NOTE — ED PROVIDER NOTE - PROGRESS NOTE DETAILS
Patient observed for 4 hours s/p medication administration for an allergic reaction. Patient protecting airway. No signs of angioedema. Edema visualized over BL eyebrows. Patient laying comfortably on stretcher. Patient aware of disposition and understands. Plan to discharge with prednisone, PCP follow up and return precaution instructions.

## 2022-07-23 NOTE — ED ADULT NURSE NOTE - NSIMPLEMENTINTERV_GEN_ALL_ED
Implemented All Universal Safety Interventions:  [place] to call system. Call bell, personal items and telephone within reach. Instruct patient to call for assistance. Room bathroom lighting operational. Non-slip footwear when patient is off stretcher. Physically safe environment: no spills, clutter or unnecessary equipment. Stretcher in lowest position, wheels locked, appropriate side rails in place.

## 2022-07-23 NOTE — ED PROVIDER NOTE - NSFOLLOWUPINSTRUCTIONS_ED_ALL_ED_FT
You were seen for an allergic reaction. Medication has been administered and we have been observing you for a period of time to assess for recurrence. Currently stable with no signs of airway swelling. We have prescribed steroids to your pharmacy. Important to take daily for 4 days. Please follow up with your primary care provider.     Continue your at home medications as prescribed. Important to avoid exposure to the same allergen.     Please return if you have any recurrence, airway swelling, difficulty breathing, vomiting, fevers, or any other concerns.

## 2022-10-11 ENCOUNTER — EMERGENCY (EMERGENCY)
Facility: HOSPITAL | Age: 37
LOS: 1 days | Discharge: HOME | End: 2022-10-11
Attending: EMERGENCY MEDICINE | Admitting: EMERGENCY MEDICINE

## 2022-10-11 VITALS
HEIGHT: 63 IN | OXYGEN SATURATION: 99 % | DIASTOLIC BLOOD PRESSURE: 71 MMHG | RESPIRATION RATE: 16 BRPM | HEART RATE: 77 BPM | TEMPERATURE: 98 F | SYSTOLIC BLOOD PRESSURE: 114 MMHG

## 2022-10-11 DIAGNOSIS — Z98.890 OTHER SPECIFIED POSTPROCEDURAL STATES: Chronic | ICD-10-CM

## 2022-10-11 PROCEDURE — 99284 EMERGENCY DEPT VISIT MOD MDM: CPT

## 2022-10-11 RX ORDER — CEPHALEXIN 500 MG
500 CAPSULE ORAL ONCE
Refills: 0 | Status: COMPLETED | OUTPATIENT
Start: 2022-10-11 | End: 2022-10-11

## 2022-10-11 RX ORDER — CEPHALEXIN 500 MG
1 CAPSULE ORAL
Qty: 21 | Refills: 0
Start: 2022-10-11 | End: 2022-10-17

## 2022-10-11 RX ORDER — DIPHENHYDRAMINE HCL 50 MG
50 CAPSULE ORAL ONCE
Refills: 0 | Status: COMPLETED | OUTPATIENT
Start: 2022-10-11 | End: 2022-10-11

## 2022-10-11 RX ADMIN — Medication 500 MILLIGRAM(S): at 12:15

## 2022-10-11 RX ADMIN — Medication 50 MILLIGRAM(S): at 12:16

## 2022-10-11 RX ADMIN — Medication 50 MILLIGRAM(S): at 12:15

## 2022-10-11 NOTE — ED ADULT TRIAGE NOTE - CHIEF COMPLAINT QUOTE
pt noted redness/ rash around eyes after having lashed changed 3 days ago.  denies changes in vision, difficulty breathing, swallowing, no rash anywhere else,  medical problem

## 2022-10-11 NOTE — ED PROVIDER NOTE - PATIENT PORTAL LINK FT
You can access the FollowMyHealth Patient Portal offered by [facility] by registering at the following website: [email or web address]. By joining [employer]’s FollowMyHealth portal, you will also be able to view your health information using other applications (apps) compatible with our system.

## 2022-10-11 NOTE — ED PROVIDER NOTE - NSFOLLOWUPINSTRUCTIONS_ED_ALL_ED_FT
Please go and have the eyelashes removed immediately, wash your face and your eye area.  As much as possible try to avoid scratching the skin around her eyes.  You are prescribed a steroid medication and antibiotics, you can also continue to take the Benadryl for the itching.  If you notice pain with eye movement, worsening swelling or redness around your eyes, fever, purulent discharge  please return to the ER immediately.

## 2022-10-11 NOTE — ED PROVIDER NOTE - PHYSICAL EXAMINATION
GEN - NAD; well appearing; A+O x3   HEAD - NC/AT   ENT: Airway patent, mmm, OP clear no swelling or erythema  EYES: normal clera, full EOMI w/o pain, PERRLA   NECK: Neck supple  PULMONARY - Non labored breathing  EXTREMITIES - moving all four extremities  NEUROLOGIC - alert, speech clear, normal gait  SKIN: nl periorbital erythema, swelling, L>R   PSYCH -nl mood/affect, nl insight.

## 2022-10-11 NOTE — ED PROVIDER NOTE - OBJECTIVE STATEMENT
37-year-old female otherwise healthy presents to the ED with 3 days of periorbital swelling and irritation after having eyelashes applied.  She has had this before but states never had this kind of reaction.  She has had a reaction to hair dye though in the past that is similar.  She states that the pruritus, erythema and swelling around the eyes has been getting worse over the last 3 days despite using Benadryl, worse on the left side than the right side.  Denies throat swelling, throat itching, difficulty breathing, nausea, vomiting, abdominal pain.

## 2022-10-11 NOTE — ED PROVIDER NOTE - CLINICAL SUMMARY MEDICAL DECISION MAKING FREE TEXT BOX
Otherwise healthy female presents with allergic reaction to eyelash placement for 3 days.  Mostly the periorbital area, suspicious for early mild preseptal cellulitis on the left secondary to itching the area.  Plan to put on Keflex, steroids, Benadryl.  Advised patient she should go straight to her  to have eyelashes removed and wash face which should help bring down the irritation.  Return precautions discussed the ED.

## 2022-10-11 NOTE — ED ADULT NURSE NOTE - OBJECTIVE STATEMENT
Pt with rash and swelling  over her eyes and face. Pt with no co sob , does not look tachypneic. pt medicated for her allergic reaction and d/cd home by resident.

## 2022-10-11 NOTE — ED PROVIDER NOTE - TEMPLATE, MLM
Phone call from patient asking about incision and when he can shower. Reviewed general instructions, has appt for wound check on 10-8-2020. No complaints.      Otilia Blanc RN, BSN  Natalie
General

## 2022-12-03 ENCOUNTER — EMERGENCY (EMERGENCY)
Facility: HOSPITAL | Age: 37
LOS: 1 days | Discharge: HOME | End: 2022-12-03
Attending: EMERGENCY MEDICINE | Admitting: EMERGENCY MEDICINE

## 2022-12-03 VITALS
SYSTOLIC BLOOD PRESSURE: 135 MMHG | DIASTOLIC BLOOD PRESSURE: 75 MMHG | TEMPERATURE: 98 F | OXYGEN SATURATION: 100 % | HEART RATE: 81 BPM | HEIGHT: 63 IN | RESPIRATION RATE: 16 BRPM

## 2022-12-03 DIAGNOSIS — Z98.890 OTHER SPECIFIED POSTPROCEDURAL STATES: Chronic | ICD-10-CM

## 2022-12-03 PROCEDURE — 99283 EMERGENCY DEPT VISIT LOW MDM: CPT

## 2022-12-03 RX ORDER — KETOCONAZOLE 20 MG/G
1 AEROSOL, FOAM TOPICAL ONCE
Refills: 0 | Status: DISCONTINUED | OUTPATIENT
Start: 2022-12-03 | End: 2022-12-03

## 2022-12-03 RX ORDER — KETOCONAZOLE 20 MG/G
1 AEROSOL, FOAM TOPICAL
Qty: 1 | Refills: 0
Start: 2022-12-03

## 2022-12-03 RX ADMIN — Medication 1 TABLET(S): at 08:34

## 2022-12-03 NOTE — ED PROVIDER NOTE - PATIENT PORTAL LINK FT
You can access the FollowMyHealth Patient Portal offered by [facility] by registering at the following website: [email or web address]. By joining [employer]’s FollowMyHealth portal, you will also be able to view your health information using other applications (apps) compatible with our system.

## 2022-12-03 NOTE — ED PROVIDER NOTE - NS ED ROS FT
General: denies fever, chills  CV: denies chest pain, palpitations  Resp: denies difficulty breathing, cough  Abdominal: denies nausea, vomiting, diarrhea, abdominal pain  Skin: itching on scalp, under breasts, cysts under b/l armpits and face and back

## 2022-12-03 NOTE — ED PROVIDER NOTE - NSFOLLOWUPINSTRUCTIONS_ED_ALL_ED_FT
No signs of emergency medical condition on today's workup.  Presumptive diagnosis made as seborrheic dermatitis of your scalp, fungal infection under your breasts and cysts under both armpits and face, but further evaluation may be required by your dermatologist for a definitive diagnosis.  Therefore, follow up as directed and if symptoms change/worsen or any emergency conditions, please return to the ER.    Use the cream once a day to the breasts, and the shampoo for the scalp, and take the Augmentin as prescribed. Please discontinue the antibiotic if you have any rash, shortness of breath, change in your throat or any other concerning symptoms.    Follow up with a Dermatologist.

## 2022-12-03 NOTE — ED PROVIDER NOTE - NSFOLLOWUPCLINICS_GEN_ALL_ED_FT
[facility]  Dermatology  [address] [place] [address]  Phone: [phone]  Fax: [phone]    [facility]  Dermatology  [address], [address]  [place] [address]  Phone: [phone]  Fax: [phone]    [facility]  Dermatology  [address] [place] [address]  Phone: [phone]  Fax: [phone]    [facility]  [facility]ermatology  [address], [address]  [place] [address]  Phone: [phone]  Fax: [phone]    [facility]  Dermatology  [address], [address]  [place] [address]  Phone: [phone]  Fax: [phone]

## 2022-12-03 NOTE — ED PROVIDER NOTE - PHYSICAL EXAMINATION
GENERAL: well appearing in no acute distress, non-toxic appearing  HEAD: scaling rash on scalp  HEENT: no ocular involvement of rash   CARDIAC: regular rate and rhythm, normal S1S2, no appreciable murmurs, 2+ pulses in UE/LE b/l  PULM: normal breath sounds, clear to ascultation bilaterally, no rales, rhonchi, wheezing  GI: abdomen nondistended, soft, nontender, no guarding, rebound tenderness  SKIN: seb derm scalp, intertriginous dermatitis under breasts-candidiasis, tender non fluctuant cyst under right arm pit, non tender cyst right mandible with no surrounding skin changes, 2cm cyst on back with no surrounding erythema or skin changes or drainage

## 2022-12-03 NOTE — ED PROVIDER NOTE - ATTENDING CONTRIBUTION TO CARE
Attending note:   After face to face evaluation of this patient, I concur with above noted hx, pe, and care plan for this patient.  37-year-old female with multiple complaints.  Patient has rash on scalp and under breasts and in groin area also having left axillary pain.  Patient has had multiple cysts in the left axilla that she has self drained multiple times over the years.  Patient is never seen a dermatologist.  Patient has no fevers or chills or trauma.  On exam patient has small firm areas between 1 to 2 cm in the left axilla with no areas of fluctuance or erythema.  Patient also has dry flaky scalp border with small areas of erythema.  Patient also has excoriated areas under bilateral breast with some erythema and satellite lesions as well as in the groin.  MDM patient with hidradenitis suppurativa which will be treated with antibiotics and outpatient Derm follow-up.  Patient was boric dermatitis and intertriginous candidiasis for which antifungal will be given.  We will send all prescriptions to pharmacy and recommend dermatology follow-up as outpatient.  There is nothing to actively drain at this time.

## 2022-12-03 NOTE — ED PROVIDER NOTE - OBJECTIVE STATEMENT
38 yo f presents to ed with cyst on right mandible, itching under breast, and cyst on b/l armpits and back. pt has had this for months. has not seen doctor or dermatologist. said in the past she has had cysts on her armpits and back but was concerned bc never was on face. has had abx in the past but is unsure which one, and said she got hives on her ears. denies any fevers, chills or any other systemic sx.

## 2022-12-03 NOTE — ED ADULT TRIAGE NOTE - CHIEF COMPLAINT QUOTE
C/o multiple painful cyst since Thanksgiving, on right side of face, left armpit and upper back. Denies fever or chills. Skin intact, no erythema. PMH anxiety

## 2022-12-03 NOTE — ED PROVIDER NOTE - CLINICAL SUMMARY MEDICAL DECISION MAKING FREE TEXT BOX
36 yo f no pmhx presents to ed with generalized painful cysts under b/l armpits, one on right mandible, one on back, and itchy rash under breasts and pruritic rash on scalp. no fevers, chills, n/v or other systemic sx. PE + for seb derm scalp, intertriginous candidiasis under breasts and hidradenitis suppurativa. no fluctuant drainable cyst at this time. pt has never seen dermatologist. will send keto shampoo, clotrimazole for breast and scalp, and augmentin for cysts and follow up with derm.

## 2022-12-03 NOTE — ED PROVIDER NOTE - NSFOLLOWUPCLINICSTOKEN_GEN_ALL_ED_FT
[id number]: || ||00\01||False;[id number]: || ||00\01||False;[id number]: || ||00\01||False;[id number]: || ||00\01||False;[id number]: || ||00\01||False;

## 2022-12-04 NOTE — ED POST DISCHARGE NOTE - REASON FOR FOLLOW-UP
Other Patient called stating that she was supposed to have Abx sent over to pharmacy but wasn't sent over. ED provider note states patient should take Augmentin. Augmentin prescribed to Patient's preferred pharmacy.

## 2023-01-13 ENCOUNTER — OUTPATIENT (OUTPATIENT)
Dept: OUTPATIENT SERVICES | Facility: HOSPITAL | Age: 38
LOS: 1 days | End: 2023-01-13
Payer: MEDICAID

## 2023-01-13 VITALS
DIASTOLIC BLOOD PRESSURE: 80 MMHG | HEART RATE: 73 BPM | WEIGHT: 261.91 LBS | TEMPERATURE: 99 F | OXYGEN SATURATION: 99 % | SYSTOLIC BLOOD PRESSURE: 121 MMHG | HEIGHT: 63 IN | RESPIRATION RATE: 16 BRPM

## 2023-01-13 DIAGNOSIS — M77.10 LATERAL EPICONDYLITIS, UNSPECIFIED ELBOW: ICD-10-CM

## 2023-01-13 DIAGNOSIS — F41.9 ANXIETY DISORDER, UNSPECIFIED: ICD-10-CM

## 2023-01-13 DIAGNOSIS — Z98.890 OTHER SPECIFIED POSTPROCEDURAL STATES: Chronic | ICD-10-CM

## 2023-01-13 DIAGNOSIS — Z01.818 ENCOUNTER FOR OTHER PREPROCEDURAL EXAMINATION: ICD-10-CM

## 2023-01-13 DIAGNOSIS — Z29.9 ENCOUNTER FOR PROPHYLACTIC MEASURES, UNSPECIFIED: ICD-10-CM

## 2023-01-13 DIAGNOSIS — I10 ESSENTIAL (PRIMARY) HYPERTENSION: Chronic | ICD-10-CM

## 2023-01-13 LAB — SARS-COV-2 RNA SPEC QL NAA+PROBE: SIGNIFICANT CHANGE UP

## 2023-01-13 PROCEDURE — G0463: CPT

## 2023-01-13 PROCEDURE — 87635 SARS-COV-2 COVID-19 AMP PRB: CPT

## 2023-01-13 NOTE — H&P PST ADULT - NSICDXFAMILYHX_GEN_ALL_CORE_FT
FAMILY HISTORY:  Mother  Still living? No  FH: stroke, Age at diagnosis: Age Unknown    Aunt  Still living? Yes, Estimated age: Age Unknown  FH: hypertension, Age at diagnosis: Age Unknown

## 2023-01-13 NOTE — H&P PST ADULT - PROBLEM SELECTOR PLAN 1
Right Elbow Debridement Lateral Epicondyle, Medial Epicondyle and Ulnar Nerve Release        STOP BANG : 2  Low risk for CIRILO complications

## 2023-01-13 NOTE — H&P PST ADULT - HISTORY OF PRESENT ILLNESS
36 yo female with history od Depression, Anxiety, reports the above.  Patient states was injured while lifting a heavy tree branch approximately three years ago.  She is scheduled for : Right Elbow Debridement Lateral Epicondyle, Medial Epicondyle and Ulnar Nerve Release, on 1/17/23

## 2023-01-13 NOTE — H&P PST ADULT - HEART RATE (BEATS/MIN)
Patient called here, spoke to Kassie BELLAMY regarding results. Per Ryan, she prescribed Pen VK for strep throat.     Note placed on Ryan's behalf.  73

## 2023-01-13 NOTE — H&P PST ADULT - NSICDXPROCEDURE_GEN_ALL_CORE_FT
PROCEDURES:  Tenotomy, elbow, with debridement and tendon repair 13-Jan-2023 09:05:50  Michelle Sheffield

## 2023-01-13 NOTE — H&P PST ADULT - ASSESSMENT
38 yo female is scheduled for : Right Elbow Debridement Lateral Epicondyle, Medial Epicondyle and Ulnar Nerve Release, on 1/17/23

## 2023-01-16 ENCOUNTER — TRANSCRIPTION ENCOUNTER (OUTPATIENT)
Age: 38
End: 2023-01-16

## 2023-01-17 ENCOUNTER — TRANSCRIPTION ENCOUNTER (OUTPATIENT)
Age: 38
End: 2023-01-17

## 2023-01-17 ENCOUNTER — OUTPATIENT (OUTPATIENT)
Dept: OUTPATIENT SERVICES | Facility: HOSPITAL | Age: 38
LOS: 1 days | End: 2023-01-17
Payer: MEDICAID

## 2023-01-17 VITALS
TEMPERATURE: 98 F | SYSTOLIC BLOOD PRESSURE: 119 MMHG | DIASTOLIC BLOOD PRESSURE: 88 MMHG | OXYGEN SATURATION: 100 % | RESPIRATION RATE: 16 BRPM | HEART RATE: 83 BPM | WEIGHT: 261.91 LBS | HEIGHT: 63 IN

## 2023-01-17 VITALS
DIASTOLIC BLOOD PRESSURE: 50 MMHG | RESPIRATION RATE: 20 BRPM | SYSTOLIC BLOOD PRESSURE: 142 MMHG | HEART RATE: 100 BPM | TEMPERATURE: 98 F | OXYGEN SATURATION: 100 %

## 2023-01-17 DIAGNOSIS — M77.10 LATERAL EPICONDYLITIS, UNSPECIFIED ELBOW: ICD-10-CM

## 2023-01-17 DIAGNOSIS — Z98.890 OTHER SPECIFIED POSTPROCEDURAL STATES: Chronic | ICD-10-CM

## 2023-01-17 DIAGNOSIS — Z01.818 ENCOUNTER FOR OTHER PREPROCEDURAL EXAMINATION: ICD-10-CM

## 2023-01-17 LAB — HCG UR QL: NEGATIVE — SIGNIFICANT CHANGE UP

## 2023-01-17 PROCEDURE — 24341 RPR TDN/MUSC UPR A/E EACH: CPT | Mod: RT

## 2023-01-17 PROCEDURE — 81025 URINE PREGNANCY TEST: CPT

## 2023-01-17 PROCEDURE — 24105 EXCISION OLECRANON BURSA: CPT | Mod: RT

## 2023-01-17 RX ORDER — OXYCODONE AND ACETAMINOPHEN 5; 325 MG/1; MG/1
1 TABLET ORAL
Qty: 30 | Refills: 0
Start: 2023-01-17 | End: 2023-01-21

## 2023-01-17 RX ORDER — ACETAMINOPHEN 500 MG
975 TABLET ORAL ONCE
Refills: 0 | Status: COMPLETED | OUTPATIENT
Start: 2023-01-17 | End: 2023-01-17

## 2023-01-17 RX ORDER — CELECOXIB 200 MG/1
200 CAPSULE ORAL ONCE
Refills: 0 | Status: COMPLETED | OUTPATIENT
Start: 2023-01-17 | End: 2023-01-17

## 2023-01-17 RX ORDER — SODIUM CHLORIDE 9 MG/ML
3 INJECTION INTRAMUSCULAR; INTRAVENOUS; SUBCUTANEOUS EVERY 8 HOURS
Refills: 0 | Status: DISCONTINUED | OUTPATIENT
Start: 2023-01-17 | End: 2023-01-17

## 2023-01-17 RX ORDER — ONDANSETRON 8 MG/1
4 TABLET, FILM COATED ORAL ONCE
Refills: 0 | Status: DISCONTINUED | OUTPATIENT
Start: 2023-01-17 | End: 2023-01-17

## 2023-01-17 RX ORDER — FENTANYL CITRATE 50 UG/ML
25 INJECTION INTRAVENOUS
Refills: 0 | Status: DISCONTINUED | OUTPATIENT
Start: 2023-01-17 | End: 2023-01-17

## 2023-01-17 RX ORDER — OXYCODONE HYDROCHLORIDE 5 MG/1
5 TABLET ORAL ONCE
Refills: 0 | Status: DISCONTINUED | OUTPATIENT
Start: 2023-01-17 | End: 2023-01-17

## 2023-01-17 RX ORDER — SENNA PLUS 8.6 MG/1
1 TABLET ORAL
Qty: 4 | Refills: 0
Start: 2023-01-17 | End: 2023-01-20

## 2023-01-17 RX ORDER — HYDROMORPHONE HYDROCHLORIDE 2 MG/ML
0.5 INJECTION INTRAMUSCULAR; INTRAVENOUS; SUBCUTANEOUS
Refills: 0 | Status: DISCONTINUED | OUTPATIENT
Start: 2023-01-17 | End: 2023-01-17

## 2023-01-17 RX ADMIN — OXYCODONE HYDROCHLORIDE 5 MILLIGRAM(S): 5 TABLET ORAL at 15:14

## 2023-01-17 RX ADMIN — Medication 975 MILLIGRAM(S): at 09:45

## 2023-01-17 RX ADMIN — OXYCODONE HYDROCHLORIDE 5 MILLIGRAM(S): 5 TABLET ORAL at 15:44

## 2023-01-17 RX ADMIN — CELECOXIB 200 MILLIGRAM(S): 200 CAPSULE ORAL at 09:44

## 2023-01-17 NOTE — ASU DISCHARGE PLAN (ADULT/PEDIATRIC) - CARE PROVIDER_API CALL
Awais Mccoy)  Orthopaedic Surgery; Sports Medicine  [address] [place] [address], [address] [place] [address]  Phone: [phone]  Fax: [phone]  Follow Up Time: 1 week

## 2023-01-17 NOTE — BRIEF OPERATIVE NOTE - NSICDXBRIEFPOSTOP_GEN_ALL_CORE_FT
POST-OP DIAGNOSIS:  Right lateral epicondylitis 17-Jan-2023 14:14:14  Pam Loera  Medial epicondylitis, right elbow 17-Jan-2023 14:14:30  Pam Loera

## 2023-01-17 NOTE — BRIEF OPERATIVE NOTE - NSICDXBRIEFPROCEDURE_GEN_ALL_CORE_FT
PROCEDURES:  Surgical debridement for lateral epicondylitis 17-Jan-2023 14:09:44 right elbow Pam Loera  Tenotomy, elbow, medial, open, with soft tissue debridement and tendon repair, for epicondylitis 17-Jan-2023 14:10:26 right elbow Pam Loera  Right cubital tunnel release 17-Jan-2023 14:12:05  Pam Loera

## 2023-01-17 NOTE — ASU PATIENT PROFILE, ADULT - FALL HARM RISK - UNIVERSAL INTERVENTIONS
Bed in lowest position, wheels locked, appropriate side rails in place/Call bell, personal items and telephone in reach/Instruct patient to call for assistance before getting out of bed or chair/Non-slip footwear when patient is out of bed/[place] to call system/Physically safe environment - no spills, clutter or unnecessary equipment/Purposeful [place]active Rounding/Room/bathroom lighting operational, light cord in reach

## 2023-01-17 NOTE — BRIEF OPERATIVE NOTE - NSICDXBRIEFPREOP_GEN_ALL_CORE_FT
PRE-OP DIAGNOSIS:  Lateral epicondylitis, right elbow 17-Jan-2023 14:13:08  Pam Loera  Medial epicondylitis, right 17-Jan-2023 14:13:26  Pam Loera

## 2023-01-17 NOTE — ASU DISCHARGE PLAN (ADULT/PEDIATRIC) - NS MD DC FALL RISK RISK
For information on Fall & Injury Prevention, visit: [email or web address] OR  [email or web address] OR  https://www.cdc.gov/steadi/patient.html

## 2023-06-25 ENCOUNTER — EMERGENCY (EMERGENCY)
Facility: HOSPITAL | Age: 38
LOS: 1 days | Discharge: HOME | End: 2023-06-25
Attending: EMERGENCY MEDICINE | Admitting: EMERGENCY MEDICINE
Payer: MEDICAID

## 2023-06-25 VITALS
SYSTOLIC BLOOD PRESSURE: 130 MMHG | DIASTOLIC BLOOD PRESSURE: 85 MMHG | TEMPERATURE: 99 F | RESPIRATION RATE: 16 BRPM | OXYGEN SATURATION: 99 % | HEART RATE: 86 BPM

## 2023-06-25 DIAGNOSIS — Z98.890 OTHER SPECIFIED POSTPROCEDURAL STATES: Chronic | ICD-10-CM

## 2023-06-25 PROCEDURE — 71046 X-RAY EXAM CHEST 2 VIEWS: CPT | Mod: 26

## 2023-06-25 PROCEDURE — 93010 ELECTROCARDIOGRAM REPORT: CPT

## 2023-06-25 PROCEDURE — 99284 EMERGENCY DEPT VISIT MOD MDM: CPT

## 2023-06-25 NOTE — ED PROVIDER NOTE - CLINICAL SUMMARY MEDICAL DECISION MAKING FREE TEXT BOX
YONAS:  38-year-old female past medical history of ectopic pregnancy, hidradenitis suppurativa, anxiety presents to the ER with chest pain, rash, Bilateral ear discharge.  Vital signs stable, afebrile.  Low concern for acute coronary syndrome as chest pain non-exertional, non-radiating, and there is no nausea or diaphoresis.  Low concern for aortic dissection as chest pain non-acute onset, not radiating to back, not described as "tearing", no pulse or neuro deficit on exam.  Low concern for pulmonary embolism, patient is PERC negative with low pre-test probability of PE.  Given chronicity of rash nonresponsive to antibiotics, autoimmune disorders such as psoriasis or fungal etiologies considered.  Exam is not consistent with SJS or TEN.  Ear exam is consistent with possible bilateral otitis externa, nonnecrotizing.  EKG nonischemic.  Will obtain chest x-ray.  Anticipate discharge with antibiotic eardrops and fungal cream.  Anticipate discharge with dermatology follow-up.

## 2023-06-25 NOTE — ED PROVIDER NOTE - ATTENDING APP SHARED VISIT CONTRIBUTION OF CARE
Brief HPI:  38-year-old female past medical history of ectopic pregnancy, hidradenitis suppurativa, anxiety presents to the ER with chest pain and rash.  Patient states for 3 days she feels discomfort in the epigastric region radiating to the sternum, described as itchy and congestion.  There is a nonproductive, nonbloody cough.  Symptoms are nonpleuritic, nonexertional, nonradiating, no nausea or diaphoresis.  Patient also states that for several months she has been having worsening rash in areas of upper arms and abdomen.  She describes these lesions as circular, with flaking skin and itching.  Has been to multiple emergency departments and prescribed "multiple creams and antibiotics" with little improvement.  She has not seen a dermatologist for this rash.  Patient also describes discharge from bilateral ears for several days.  Denies hearing loss, headache.  Denies fever, chills, recent travel, sick contacts, new creams or detergents, time spent in woods, insect bites.    Vitals:   Reviewed    Exam:    GEN:  Non-toxic appearing, non-distressed, speaking full sentences, non-diaphoretic, AAOx3  HEENT:  NCAT, neck supple, EOMI, PERRLA, sclera anicteric, no conjunctival pallor or injection, no stridor, normal voice, no tonsillar exudate, uvula midline  CV:  regular rhythm and rate, s1/s2 audible, no murmurs, rubs or gallops, peripheral pulses 2+ and symmetric  PULM:  non-labored respirations, lungs clear to auscultation bilaterally, no wheezes, crackles or rales  ABD:  non distended, non-tender, no rebound, no guarding, negative Escobar's sign, bowel sounds normal, no cvat  MSK:  no gross deformity, non-tender extremities and joints, range of motion grossly normal appearing, no extremity edema, extremities warm and well perfused   NEURO:  AAOx3, CN II-XII intact, motor 5/5 in upper and lower extremities bilaterally, sensation grossly intact in extremities and trunk, finger to nose testing wnl, no nystagmus, negative Romberg, no pronator drift, no gait deficit  SKIN:   Right upper extremity with circular, annular plaque with scaly features and flaking skin and surrounding ring.  Abdominal area and area under bilateral breasts with smaller circular plaques of hyperpigmentation.  Sparing palms and soles.  Nikolsky negative.    A/P:  38-year-old female past medical history of ectopic pregnancy, hidradenitis suppurativa, anxiety presents to the ER with chest pain, rash, Bilateral ear discharge.  Vital signs stable, afebrile.  Low concern for acute coronary syndrome as chest pain non-exertional, non-radiating, and there is no nausea or diaphoresis.  Low concern for aortic dissection as chest pain non-acute onset, not radiating to back, not described as "tearing", no pulse or neuro deficit on exam.  Low concern for pulmonary embolism, patient is PERC negative with low pre-test probability of PE.  Given chronicity of rash nonresponsive to antibiotics, autoimmune disorders such as psoriasis or fungal etiologies considered.  Exam is not consistent with SJS or TEN.  Ear exam is consistent with possible bilateral otitis externa, nonnecrotizing.  EKG nonischemic.  Will obtain chest x-ray.  Anticipate discharge with antibiotic eardrops and fungal cream.  Anticipate discharge with dermatology follow-up.

## 2023-06-25 NOTE — ED PROVIDER NOTE - NSFOLLOWUPINSTRUCTIONS_ED_ALL_ED_FT
Follow-up with your primary care doctor within 1 week.  Follow-up with dermatology within 1 week, clinic information attached please call to make an appointment.  Apply nystatin ointment twice daily to affected areas.  Use Ciprodex drops 3 drops to each ear twice daily for 7 days.  Return to the ER with any worsening or concerning symptoms, worsening chest pain or shortness of breath, weakness, fever/chills, worsening rash or any other concerns.

## 2023-06-25 NOTE — ED PROVIDER NOTE - PATIENT PORTAL LINK FT
You can access the FollowMyHealth Patient Portal offered by [facility] by registering at the following website: [email or web address]. By joining [employer]’s FollowMyHealth portal, you will also be able to view your health information using other applications (apps) compatible with our system.

## 2023-06-25 NOTE — ED PROVIDER NOTE - OBJECTIVE STATEMENT
38-year-old female with past medical history ectopic pregnancy, anxiety presenting to the ER with chest pain and rash.  Patient states for the past 3 days she feels discomfort in the upper sternal region described as itchy and congested at times with cough and shortness of breath.  Patient additionally reports rash to her right elbow, right outer thigh, abdomen and back that she has had since August and feels it is worsening.  Patient reports that she is having pain in her ears bilaterally as well as itchy flaking skin in the region.  Patient denies fever/chills, palpitations, abdominal pain, N/V/D, near syncope, leg pain or swelling, OCP use, recent travel or illness or any other concerns.  Of note patient was recently seen at outside hospital with similar complaints and was given a topical cream to use which she does not remember the name of, has not seen a dermatologist for these complaints.

## 2023-06-25 NOTE — ED ADULT TRIAGE NOTE - CHIEF COMPLAINT QUOTE
Patient was advised and in agreement they do not meet Urgent Care criteria based on triage symptoms.   
C/o non-radiating mid sternal chest pain that started today. Pt states she had surgery to repair "tennis elbows" on right arm in Jan 2023, skin started to become itchy and patchy dry skin since stitches were removed.

## 2023-06-25 NOTE — ED PROVIDER NOTE - PHYSICAL EXAMINATION
Exam:    GEN:  Non-toxic appearing, non-distressed, speaking full sentences, non-diaphoretic, AAOx3  HEENT:  NCAT, neck supple, EOMI, PERRLA, sclera anicteric, no conjunctival pallor or injection, no stridor, normal voice, no tonsillar exudate, uvula midline  CV:  regular rhythm and rate, s1/s2 audible, no murmurs, rubs or gallops, peripheral pulses 2+ and symmetric  PULM:  non-labored respirations, lungs clear to auscultation bilaterally, no wheezes, crackles or rales  ABD:  non distended, non-tender, no rebound, no guarding, negative Escobar's sign, bowel sounds normal, no cvat  MSK:  no gross deformity, non-tender extremities and joints, range of motion grossly normal appearing, no extremity edema, extremities warm and well perfused   NEURO:  AAOx3, CN II-XII intact, motor 5/5 in upper and lower extremities bilaterally, sensation grossly intact in extremities and trunk, finger to nose testing wnl, no nystagmus, negative Romberg, no pronator drift, no gait deficit  SKIN:   Right upper extremity with circular, annular plaque with scaly features and flaking skin and surrounding ring.  Abdominal area and area under bilateral breasts with smaller circular plaques of hyperpigmentation.  Sparing palms and soles.  Nikolsky negative.    Authored by Mathieu Bahena MD

## 2023-06-26 RX ORDER — CIPROFLOXACIN AND DEXAMETHASONE 3; 1 MG/ML; MG/ML
4 SUSPENSION/ DROPS AURICULAR (OTIC)
Qty: 1 | Refills: 0
Start: 2023-06-26 | End: 2023-07-02

## 2023-06-26 RX ORDER — NYSTATIN CREAM 100000 [USP'U]/G
1 CREAM TOPICAL
Qty: 1 | Refills: 0
Start: 2023-06-26 | End: 2023-06-30

## 2024-02-22 NOTE — ED PROVIDER NOTE - CROS ED CARDIOVAS ALL NEG
Problem: Pain  Goal: Acceptable pain level achieved/maintained at rest using appropriate pain scale for the patient  Outcome: Monitoring/Evaluating progress     Problem: Impaired Physical Mobility  Goal: Functional status is maintained or returned to baseline during hospitalization  Outcome: Not met, plan adjusted     Problem: Elimination-Bowel, Alteration  Goal: # Bowel function maintained/improved  Outcome: Not met, plan adjusted      negative...

## 2024-03-04 ENCOUNTER — EMERGENCY (EMERGENCY)
Facility: HOSPITAL | Age: 39
LOS: 1 days | Discharge: HOME | End: 2024-03-04
Attending: EMERGENCY MEDICINE | Admitting: EMERGENCY MEDICINE
Payer: SELF-PAY

## 2024-03-04 VITALS
DIASTOLIC BLOOD PRESSURE: 69 MMHG | RESPIRATION RATE: 18 BRPM | TEMPERATURE: 98 F | HEART RATE: 77 BPM | SYSTOLIC BLOOD PRESSURE: 125 MMHG | OXYGEN SATURATION: 100 %

## 2024-03-04 VITALS
DIASTOLIC BLOOD PRESSURE: 87 MMHG | OXYGEN SATURATION: 100 % | SYSTOLIC BLOOD PRESSURE: 142 MMHG | TEMPERATURE: 98 F | RESPIRATION RATE: 18 BRPM | HEART RATE: 79 BPM

## 2024-03-04 DIAGNOSIS — Z98.890 OTHER SPECIFIED POSTPROCEDURAL STATES: Chronic | ICD-10-CM

## 2024-03-04 PROCEDURE — 99284 EMERGENCY DEPT VISIT MOD MDM: CPT

## 2024-03-04 PROCEDURE — 71046 X-RAY EXAM CHEST 2 VIEWS: CPT | Mod: 26

## 2024-03-04 RX ORDER — IBUPROFEN 200 MG
600 TABLET ORAL ONCE
Refills: 0 | Status: COMPLETED | OUTPATIENT
Start: 2024-03-04 | End: 2024-03-04

## 2024-03-04 RX ORDER — ACETAMINOPHEN 500 MG
975 TABLET ORAL ONCE
Refills: 0 | Status: COMPLETED | OUTPATIENT
Start: 2024-03-04 | End: 2024-03-04

## 2024-03-04 RX ADMIN — Medication 975 MILLIGRAM(S): at 21:23

## 2024-03-04 RX ADMIN — Medication 600 MILLIGRAM(S): at 21:22

## 2024-03-04 RX ADMIN — Medication 975 MILLIGRAM(S): at 22:37

## 2024-03-04 RX ADMIN — Medication 600 MILLIGRAM(S): at 22:37

## 2024-03-04 NOTE — ED PROVIDER NOTE - PHYSICAL EXAMINATION
PHYSICAL EXAM:  GENERAL: Sitting comfortable in bed, in no acute distress  HENMT: Atraumatic, moist mucous membranes  EYES: Clear bilaterally, PERRL, EOMs intact b/l  HEART: Regular rate and regular rhythm, S1/S2, no murmur  RESPIRATORY: Clear to auscultation bilaterally, no wheezes/rhonchi/rales  ABDOMEN: Soft, nontender, nondistended  MSK: R chest wall ttp  EXTREMITIES: R wrist cyst with no ttp, no LE edema  NEURO: Alert, follows commands, moving all extremities symmetrically, no focal sensory deficits   SKIN: Diffuse psoriatic plaques

## 2024-03-04 NOTE — ED PROVIDER NOTE - OBJECTIVE STATEMENT
39 female past medical history depression, anxiety, psoriasis presenting with right wrist pain and right chest pain.  Patient says that she developed a nodule on her right wrist several months ago.  She has some pain over the area and numbness in her right hand.  Came in today because the pain was getting worse.  Patient also in the right side of her chest between her right shoulder and right breast.  The pain is worse when she moves her arm or presses on her chest.  This has been present for at least 3 weeks.  Denies any trauma to the area.  Has not taken anything for pain.  No fever, SOB.

## 2024-03-04 NOTE — ED PROVIDER NOTE - CLINICAL SUMMARY MEDICAL DECISION MAKING FREE TEXT BOX
39 female past medical history depression, anxiety, psoriasis presenting with right wrist pain and right chest pain. DDx includes but not limited to: CP likely MSK pain; wrist nodule likely a ganglion cyst. Plan: pain control, CXR. Likely dc with derm and hand f/u.

## 2024-03-04 NOTE — ED PROVIDER NOTE - NSFOLLOWUPINSTRUCTIONS_ED_ALL_ED_FT
You have been evaluated in the Emergency Department today for wrist and chest wall pain. Your evaluation did not show evidence of medical conditions requiring emergent intervention at this time.    The Hospital will call in a couple days to schedule an appointment with a Dermatologist and a Hand Surgeon.    For pain, you can take TYLENOL/ACETAMINOPHEN up to 4,000mg a day for your symptoms in four divided doses and MOTRIN/IBUPROFEN up to 2,400mg a day in four divided doses (for up to 5 days with food).    Return to the Emergency Department if you experience uncontrolled pain, difficulty breathing, or any other concerning symptoms.    Thank you for choosing us for your care.

## 2024-03-04 NOTE — ED PROVIDER NOTE - PATIENT PORTAL LINK FT
You can access the FollowMyHealth Patient Portal offered by [facility] by registering at the following website: [email or web address]. By joining [facility]’s FollowMyHealth portal, you will also be able to view your health information using other applications (apps) compatible with our system.

## 2024-03-04 NOTE — ED PROVIDER NOTE - ATTENDING CONTRIBUTION TO CARE
Patient with a hx of psoriasis PTED with lesion of wrist probable fibroma requiring derm referral and chest wall pain   plan pain control and cxr   will reassess

## 2024-03-04 NOTE — ED ADULT NURSE NOTE - OBJECTIVE STATEMENT
received pt intake. A&OX4 RR even unlabored completing full sentences. pt presents c/o right wrist pain and right breast pain x 2 weeks.  pt has nodule to right wrist. denies h/a, dizziness/lightheadedness, abd pain, n/v/d, fevers/chills, cp, sob, falls/trauma. pt medicated per EMAR. safety measures maintained throughout care

## 2024-03-04 NOTE — ED ADULT NURSE NOTE - NSFALLUNIVINTERV_ED_ALL_ED
Bed/Stretcher in lowest position, wheels locked, appropriate side rails in place/Call bell, personal items and telephone in reach/Instruct patient to call for assistance before getting out of bed/chair/stretcher/Non-slip footwear applied when patient is off stretcher/Pilot to call system/Physically safe environment - no spills, clutter or unnecessary equipment/Purposeful proactive rounding/Room/bathroom lighting operational, light cord in reach

## 2024-10-31 NOTE — H&P ADULT - PROBLEM SELECTOR PROBLEM 1
At the request of Dr. Lowery, I have submitted a referral to [facility] for the weight loss phone program.  Mariela is currently on the fence about surgery and Dr. Lowery feels that this may give her some support in her decision  
Tasklist updated.    Sahyy Vasquez RN, CBN  [facility]  P [phone]  F [phone]    
Pregnancy

## (undated) DEVICE — PACK EXTREMITY

## (undated) DEVICE — SOL IRR POUR H2O 1000ML

## (undated) DEVICE — SOL IRR POUR NS 0.9% 1000ML

## (undated) DEVICE — BLADE SURGICAL #15 CARBON

## (undated) DEVICE — SUT VICRYL PLUS 1 27" CT-1 UNDYED

## (undated) DEVICE — VISITEC 4X4

## (undated) DEVICE — GLV 8 PROTEXIS (WHITE)

## (undated) DEVICE — DRSG KLING 2"

## (undated) DEVICE — ELCTR BOVIE TIP NEEDLE IMA/ENT 3/4"

## (undated) DEVICE — TOURNIQUET ESMARK 4"

## (undated) DEVICE — SYR CONTROL LUER LOK 10CC

## (undated) DEVICE — TUBING SUCTION NONCONDUCTIVE 6MM X 12FT

## (undated) DEVICE — SUT POLYSORB 2-0 36" GS-21 UNDYED

## (undated) DEVICE — VENODYNE/SCD SLEEVE CALF MEDIUM

## (undated) DEVICE — VESSEL LOOP ASPEN MINI YELLOW

## (undated) DEVICE — SUT VICRYL PLUS 4-0 18" PS-2 UNDYED

## (undated) DEVICE — NDL HYPO REGULAR BEVEL 25G X 1.5" (BLUE)

## (undated) DEVICE — DRAPE HAND 77" X 146"

## (undated) DEVICE — SUT VICRYL PLUS 2-0 27" SH UNDYED

## (undated) DEVICE — FOR-TOURNIQUET 1130808443: Type: DURABLE MEDICAL EQUIPMENT

## (undated) DEVICE — FOR-ESU VALLEYLAB T7E15008DX: Type: DURABLE MEDICAL EQUIPMENT

## (undated) DEVICE — WARMING BLANKET LOWER ADULT

## (undated) DEVICE — SUT VICRYL 3-0 27" CT-2 UNDYED

## (undated) DEVICE — SUT MONOCRYL 4-0 27" PS-2 UNDYED